# Patient Record
Sex: MALE | Race: WHITE | Employment: OTHER | URBAN - METROPOLITAN AREA
[De-identification: names, ages, dates, MRNs, and addresses within clinical notes are randomized per-mention and may not be internally consistent; named-entity substitution may affect disease eponyms.]

---

## 2017-03-25 ENCOUNTER — APPOINTMENT (OUTPATIENT)
Dept: CT IMAGING | Age: 67
DRG: 066 | End: 2017-03-25
Attending: EMERGENCY MEDICINE
Payer: MEDICARE

## 2017-03-25 ENCOUNTER — APPOINTMENT (OUTPATIENT)
Dept: GENERAL RADIOLOGY | Age: 67
DRG: 066 | End: 2017-03-25
Attending: EMERGENCY MEDICINE
Payer: MEDICARE

## 2017-03-25 ENCOUNTER — APPOINTMENT (OUTPATIENT)
Dept: MRI IMAGING | Age: 67
DRG: 066 | End: 2017-03-25
Attending: SPECIALIST
Payer: MEDICARE

## 2017-03-25 ENCOUNTER — HOSPITAL ENCOUNTER (INPATIENT)
Age: 67
LOS: 1 days | Discharge: HOME | DRG: 066 | End: 2017-03-27
Attending: EMERGENCY MEDICINE | Admitting: INTERNAL MEDICINE
Payer: MEDICARE

## 2017-03-25 ENCOUNTER — APPOINTMENT (OUTPATIENT)
Dept: MRI IMAGING | Age: 67
DRG: 066 | End: 2017-03-25
Attending: INTERNAL MEDICINE
Payer: MEDICARE

## 2017-03-25 DIAGNOSIS — R29.898 LEFT ARM WEAKNESS: Primary | ICD-10-CM

## 2017-03-25 DIAGNOSIS — I16.1 HYPERTENSIVE EMERGENCY: ICD-10-CM

## 2017-03-25 PROBLEM — R53.1 WEAKNESS: Status: ACTIVE | Noted: 2017-03-25

## 2017-03-25 PROBLEM — E66.9 OBESITY (BMI 30-39.9): Chronic | Status: ACTIVE | Noted: 2017-03-25

## 2017-03-25 PROBLEM — I10 HTN (HYPERTENSION): Chronic | Status: ACTIVE | Noted: 2017-03-25

## 2017-03-25 PROBLEM — I10 HTN (HYPERTENSION): Status: ACTIVE | Noted: 2017-03-25

## 2017-03-25 PROBLEM — E78.00 HIGH CHOLESTEROL: Chronic | Status: ACTIVE | Noted: 2017-03-25

## 2017-03-25 PROBLEM — E78.00 HIGH CHOLESTEROL: Status: ACTIVE | Noted: 2017-03-25

## 2017-03-25 LAB
ALBUMIN SERPL BCP-MCNC: 3.9 G/DL (ref 3.5–5)
ALBUMIN/GLOB SERPL: 0.9 {RATIO} (ref 1.1–2.2)
ALP SERPL-CCNC: 113 U/L (ref 45–117)
ALT SERPL-CCNC: 21 U/L (ref 12–78)
ANION GAP BLD CALC-SCNC: 9 MMOL/L (ref 5–15)
APTT PPP: 32.6 SEC (ref 22.1–32.5)
AST SERPL W P-5'-P-CCNC: 18 U/L (ref 15–37)
ATRIAL RATE: 86 BPM
BASOPHILS # BLD AUTO: 0 K/UL (ref 0–0.1)
BASOPHILS # BLD: 0 % (ref 0–1)
BILIRUB SERPL-MCNC: 0.5 MG/DL (ref 0.2–1)
BUN SERPL-MCNC: 16 MG/DL (ref 6–20)
BUN/CREAT SERPL: 14 (ref 12–20)
CALCIUM SERPL-MCNC: 8.8 MG/DL (ref 8.5–10.1)
CALCULATED P AXIS, ECG09: 49 DEGREES
CALCULATED R AXIS, ECG10: -23 DEGREES
CALCULATED T AXIS, ECG11: 86 DEGREES
CHLORIDE SERPL-SCNC: 102 MMOL/L (ref 97–108)
CK SERPL-CCNC: 78 U/L (ref 39–308)
CO2 SERPL-SCNC: 28 MMOL/L (ref 21–32)
CREAT SERPL-MCNC: 1.17 MG/DL (ref 0.7–1.3)
DIAGNOSIS, 93000: NORMAL
EOSINOPHIL # BLD: 0.1 K/UL (ref 0–0.4)
EOSINOPHIL NFR BLD: 2 % (ref 0–7)
ERYTHROCYTE [DISTWIDTH] IN BLOOD BY AUTOMATED COUNT: 13 % (ref 11.5–14.5)
GLOBULIN SER CALC-MCNC: 4.5 G/DL (ref 2–4)
GLUCOSE BLD STRIP.AUTO-MCNC: 113 MG/DL (ref 65–100)
GLUCOSE SERPL-MCNC: 124 MG/DL (ref 65–100)
HCT VFR BLD AUTO: 51.9 % (ref 36.6–50.3)
HGB BLD-MCNC: 17.3 G/DL (ref 12.1–17)
INR BLD: 1 (ref 0.9–1.2)
LYMPHOCYTES # BLD AUTO: 20 % (ref 12–49)
LYMPHOCYTES # BLD: 1.6 K/UL (ref 0.8–3.5)
MCH RBC QN AUTO: 30.6 PG (ref 26–34)
MCHC RBC AUTO-ENTMCNC: 33.3 G/DL (ref 30–36.5)
MCV RBC AUTO: 91.9 FL (ref 80–99)
MONOCYTES # BLD: 0.6 K/UL (ref 0–1)
MONOCYTES NFR BLD AUTO: 7 % (ref 5–13)
NEUTS SEG # BLD: 5.6 K/UL (ref 1.8–8)
NEUTS SEG NFR BLD AUTO: 71 % (ref 32–75)
P-R INTERVAL, ECG05: 156 MS
PLATELET # BLD AUTO: 241 K/UL (ref 150–400)
POTASSIUM SERPL-SCNC: 4.2 MMOL/L (ref 3.5–5.1)
PROT SERPL-MCNC: 8.4 G/DL (ref 6.4–8.2)
Q-T INTERVAL, ECG07: 402 MS
QRS DURATION, ECG06: 100 MS
QTC CALCULATION (BEZET), ECG08: 481 MS
RBC # BLD AUTO: 5.65 M/UL (ref 4.1–5.7)
SERVICE CMNT-IMP: ABNORMAL
SODIUM SERPL-SCNC: 139 MMOL/L (ref 136–145)
THERAPEUTIC RANGE,PTTT: ABNORMAL SECS (ref 58–77)
TROPONIN I SERPL-MCNC: <0.04 NG/ML
TROPONIN I SERPL-MCNC: <0.04 NG/ML
VENTRICULAR RATE, ECG03: 86 BPM
WBC # BLD AUTO: 7.9 K/UL (ref 4.1–11.1)

## 2017-03-25 PROCEDURE — 70544 MR ANGIOGRAPHY HEAD W/O DYE: CPT

## 2017-03-25 PROCEDURE — 70551 MRI BRAIN STEM W/O DYE: CPT

## 2017-03-25 PROCEDURE — 70450 CT HEAD/BRAIN W/O DYE: CPT

## 2017-03-25 PROCEDURE — 85610 PROTHROMBIN TIME: CPT

## 2017-03-25 PROCEDURE — 93005 ELECTROCARDIOGRAM TRACING: CPT

## 2017-03-25 PROCEDURE — 85730 THROMBOPLASTIN TIME PARTIAL: CPT | Performed by: EMERGENCY MEDICINE

## 2017-03-25 PROCEDURE — 74011250636 HC RX REV CODE- 250/636: Performed by: INTERNAL MEDICINE

## 2017-03-25 PROCEDURE — 99218 HC RM OBSERVATION: CPT

## 2017-03-25 PROCEDURE — 84484 ASSAY OF TROPONIN QUANT: CPT | Performed by: EMERGENCY MEDICINE

## 2017-03-25 PROCEDURE — 94762 N-INVAS EAR/PLS OXIMTRY CONT: CPT

## 2017-03-25 PROCEDURE — 71010 XR CHEST PORT: CPT

## 2017-03-25 PROCEDURE — 85025 COMPLETE CBC W/AUTO DIFF WBC: CPT | Performed by: EMERGENCY MEDICINE

## 2017-03-25 PROCEDURE — 74011000250 HC RX REV CODE- 250: Performed by: EMERGENCY MEDICINE

## 2017-03-25 PROCEDURE — 74011250637 HC RX REV CODE- 250/637: Performed by: EMERGENCY MEDICINE

## 2017-03-25 PROCEDURE — 82550 ASSAY OF CK (CPK): CPT | Performed by: INTERNAL MEDICINE

## 2017-03-25 PROCEDURE — 96374 THER/PROPH/DIAG INJ IV PUSH: CPT

## 2017-03-25 PROCEDURE — 82962 GLUCOSE BLOOD TEST: CPT

## 2017-03-25 PROCEDURE — 36415 COLL VENOUS BLD VENIPUNCTURE: CPT | Performed by: INTERNAL MEDICINE

## 2017-03-25 PROCEDURE — 99285 EMERGENCY DEPT VISIT HI MDM: CPT

## 2017-03-25 PROCEDURE — 80053 COMPREHEN METABOLIC PANEL: CPT | Performed by: EMERGENCY MEDICINE

## 2017-03-25 RX ORDER — LORATADINE 10 MG/1
10 TABLET ORAL DAILY
COMMUNITY
End: 2017-04-10

## 2017-03-25 RX ORDER — SODIUM CHLORIDE 0.9 % (FLUSH) 0.9 %
5-10 SYRINGE (ML) INJECTION AS NEEDED
Status: DISCONTINUED | OUTPATIENT
Start: 2017-03-25 | End: 2017-03-27 | Stop reason: HOSPADM

## 2017-03-25 RX ORDER — LORATADINE 10 MG/1
10 TABLET ORAL DAILY
Status: DISCONTINUED | OUTPATIENT
Start: 2017-03-26 | End: 2017-03-27 | Stop reason: HOSPADM

## 2017-03-25 RX ORDER — GUAIFENESIN 100 MG/5ML
162 LIQUID (ML) ORAL
Status: COMPLETED | OUTPATIENT
Start: 2017-03-25 | End: 2017-03-25

## 2017-03-25 RX ORDER — ACETAMINOPHEN 650 MG/1
650 SUPPOSITORY RECTAL
Status: DISCONTINUED | OUTPATIENT
Start: 2017-03-25 | End: 2017-03-27 | Stop reason: HOSPADM

## 2017-03-25 RX ORDER — ATORVASTATIN CALCIUM 10 MG/1
10 TABLET, FILM COATED ORAL DAILY
COMMUNITY
End: 2017-03-27

## 2017-03-25 RX ORDER — ONDANSETRON 2 MG/ML
4 INJECTION INTRAMUSCULAR; INTRAVENOUS
Status: DISCONTINUED | OUTPATIENT
Start: 2017-03-25 | End: 2017-03-27 | Stop reason: HOSPADM

## 2017-03-25 RX ORDER — ATORVASTATIN CALCIUM 10 MG/1
10 TABLET, FILM COATED ORAL DAILY
Status: DISCONTINUED | OUTPATIENT
Start: 2017-03-26 | End: 2017-03-26

## 2017-03-25 RX ORDER — SODIUM CHLORIDE 0.9 % (FLUSH) 0.9 %
5-10 SYRINGE (ML) INJECTION EVERY 8 HOURS
Status: DISCONTINUED | OUTPATIENT
Start: 2017-03-25 | End: 2017-03-27 | Stop reason: HOSPADM

## 2017-03-25 RX ORDER — ENOXAPARIN SODIUM 100 MG/ML
40 INJECTION SUBCUTANEOUS EVERY 24 HOURS
Status: DISCONTINUED | OUTPATIENT
Start: 2017-03-25 | End: 2017-03-27 | Stop reason: HOSPADM

## 2017-03-25 RX ORDER — LABETALOL HYDROCHLORIDE 5 MG/ML
10 INJECTION, SOLUTION INTRAVENOUS
Status: COMPLETED | OUTPATIENT
Start: 2017-03-25 | End: 2017-03-25

## 2017-03-25 RX ORDER — ASPIRIN 325 MG
325 TABLET ORAL DAILY
Status: CANCELLED | OUTPATIENT
Start: 2017-03-26

## 2017-03-25 RX ORDER — NAPROXEN SODIUM 220 MG
440 TABLET ORAL 2 TIMES DAILY WITH MEALS
Status: ON HOLD | COMMUNITY
End: 2017-03-25

## 2017-03-25 RX ORDER — GUAIFENESIN 100 MG/5ML
81 LIQUID (ML) ORAL DAILY
Status: DISCONTINUED | OUTPATIENT
Start: 2017-03-26 | End: 2017-03-27 | Stop reason: HOSPADM

## 2017-03-25 RX ORDER — ACETAMINOPHEN 325 MG/1
650 TABLET ORAL
Status: DISCONTINUED | OUTPATIENT
Start: 2017-03-25 | End: 2017-03-27 | Stop reason: HOSPADM

## 2017-03-25 RX ORDER — LABETALOL HYDROCHLORIDE 5 MG/ML
5 INJECTION, SOLUTION INTRAVENOUS
Status: DISCONTINUED | OUTPATIENT
Start: 2017-03-25 | End: 2017-03-27 | Stop reason: HOSPADM

## 2017-03-25 RX ADMIN — Medication 10 ML: at 22:00

## 2017-03-25 RX ADMIN — Medication 10 ML: at 13:21

## 2017-03-25 RX ADMIN — ASPIRIN 81 MG CHEWABLE TABLET 162 MG: 81 TABLET CHEWABLE at 11:47

## 2017-03-25 RX ADMIN — LABETALOL HYDROCHLORIDE 10 MG: 5 INJECTION, SOLUTION INTRAVENOUS at 11:07

## 2017-03-25 RX ADMIN — ENOXAPARIN SODIUM 40 MG: 40 INJECTION SUBCUTANEOUS at 13:20

## 2017-03-25 NOTE — IP AVS SNAPSHOT
[address]  [address] [phone] Patient: Denia Orlando MRN: [id number] BLM:[id number] You are allergic to the following Allergen Reactions Macrolide Antibiotics Hives Other Medication Hives All mycin medication groups/family Recent Documentation Height Weight BMI Smoking Status 1.854 m 109.6 kg 31.88 kg/m2 Former Smoker Emergency Contacts Name Discharge Info Relation Home Work Mobile Nia Acosta DISCHARGE CAREGIVER [3] Spouse [3] [phone] About your hospitalization You were admitted on:  March 25, 2017 You last received care in the:  [facility] 3 PRO CARE TELE 2 You were discharged on:  March 27, 2017 Unit phone number:  [phone] Why you were hospitalized Your primary diagnosis was:  Acute Cva (Cerebrovascular Accident) (Hcc) Your diagnoses also included:  High Cholesterol, Htn (Hypertension), Obesity (Bmi 30-39. 9) Providers Seen During Your Hospitalizations Provider Role Specialty Primary office phone Zoie Garces MD Attending Provider Emergency Medicine [phone] Jamaica Swain MD Attending Provider Internal Medicine [phone] Your Primary Care Physician (PCP) Primary Care Physician Office Phone Office Fax Arielle, [address] [place] [address] [phone] Follow-up Information Follow up With Details Comments Contact Info Navin Herndon MD In 2 weeks  [address] 
[phone] Tyrel Dose, NP Schedule an appointment as soon as possible for a visit Neurology follow-up in clinic, post stroke with nurse practitioner:  Call to speak with Lucinda Smith or [facility] to schedule appt for 2 weeks from discharge  [address] [facility] [address] [phone] Current Discharge Medication List  
  
START taking these medications Dose & Instructions Dispensing Information Comments Morning Noon Evening Bedtime  
 aspirin 81 mg chewable tablet Your last dose was: Your next dose is:    
   
   
 Dose:  81 mg Take 1 Tab by mouth daily. Indications: Acute Coronary Syndrome Refills:  0  
     
   
   
   
  
 atenolol 25 mg tablet Commonly known as:  TENORMIN Your last dose was: Your next dose is:    
   
   
 Dose:  12.5 mg Take 0.5 Tabs by mouth daily. Quantity:  30 Tab Refills:  0 CONTINUE these medications which have CHANGED Dose & Instructions Dispensing Information Comments Morning Noon Evening Bedtime  
 atorvastatin 40 mg tablet Commonly known as:  LIPITOR What changed:   
- medication strength 
- how much to take Your last dose was: Your next dose is:    
   
   
 Dose:  40 mg Take 1 Tab by mouth daily. Quantity:  30 Tab Refills:  0 CONTINUE these medications which have NOT CHANGED Dose & Instructions Dispensing Information Comments Morning Noon Evening Bedtime CLARITIN 10 mg tablet Generic drug:  loratadine Your last dose was: Your next dose is:    
   
   
 Dose:  10 mg Take 10 mg by mouth daily. Refills:  0 STOP taking these medications ALEVE 220 mg tablet Generic drug:  naproxen sodium Where to Get Your Medications Information on where to get these meds will be given to you by the nurse or doctor. ! Ask your nurse or doctor about these medications  
  atenolol 25 mg tablet  
 atorvastatin 40 mg tablet Discharge Instructions HOSPITALIST DISCHARGE INSTRUCTIONS 
NAME: Aletha Berman :  1950 MRN:  [id number] Date/Time:  3/27/2017 8:12 AM 
 
ADMIT DATE: 3/25/2017 DISCHARGE DATE: 3/27/2017 DISCHARGE DIAGNOSIS: 
 Embolic stroke - Multiple acute cortical and subcortical infarcts in the distal right MCA 
distribution of the right mid and posterior frontal lobe MEDICATIONS: 
· It is important that you take the medication exactly as they are prescribed. · Keep your medication in the bottles provided by the pharmacist and keep a list of the medication names, dosages, and times to be taken in your wallet. · Do not take other medications without consulting your doctor. Pain Management: per above medications What to do at [facility] Recommended diet:  Cardiac Diet Recommended activity: Activity as tolerated Neurology said that a cardiologist will contact you and make arrangements for a cardiac event monitor to be sent to you. Further instructions will be given at that time. If you experience any of the following symptoms then please call your primary care physician or return to the emergency room if you cannot get hold of your doctor: 
Fever, chills, nausea, vomiting, diarrhea, change in mentation, falling, bleeding, shortness of breath Follow Up: Follow-up Information Follow up With Details Comments Contact Info Suhail Jeter MD In 2 weeks  [address] [facility] 
[phone] Tere Edward MD  in 4-6 weeks for stroke follow up [address] [facility] [address] [facility] [address] 
[phone] Information obtained by : 
I understand that if any problems occur once I am at home I am to contact my physician. I understand and acknowledge receipt of the instructions indicated above. Physician's or R.N.'s Signature                                                                  Date/Time Patient or Representative Signature                                                          Date/Time Discharge Orders None [employer] Announcement We are excited to announce that we are making your provider's discharge notes available to you in [employer]. You will see these notes when they are completed and signed by the physician that discharged you from your recent hospital stay. If you have any questions or concerns about any information you see in [employer], please call the Health Information Department where you were seen or reach out to your Primary Care Provider for more information about your plan of care. Introducing [facility]! Adela Obrien introduces [employer] patient portal. Now you can access parts of your medical record, email your doctor's office, and request medication refills online. 1. In your internet browser, go to [email or web address] 2. Click on the First Time User? Click Here link in the Sign In box. You will see the New Member Sign Up page. 3. Enter your [employer] Access Code exactly as it appears below. You will not need to use this code after youve completed the sign-up process. If you do not sign up before the expiration date, you must request a new code. · [employer] Access Code: [email or web address] Expires: 6/23/2017 10:03 AM 
 
4. Enter the last four digits of your Social Security Number (xxxx) and Date of Birth (mm/dd/yyyy) as indicated and click Submit. You will be taken to the next sign-up page. 5. Create a [employer] ID. This will be your [employer] login ID and cannot be changed, so think of one that is secure and easy to remember. 6. Create a [employer] password. You can change your password at any time. 7. Enter your Password Reset Question and Answer. This can be used at a later time if you forget your password. 8. Enter your e-mail address. You will receive e-mail notification when new information is available in [address] 9. Click Sign Up. You can now view and download portions of your medical record. 10. Click the Download Summary menu link to download a portable copy of your medical information. If you have questions, please visit the Frequently Asked Questions section of the [employer] website. Remember, [employer] is NOT to be used for urgent needs. For medical emergencies, dial 911. Now available from your iPhone and Android! General Information Please provide this summary of care documentation to your next provider. Patient Signature:  ____________________________________________________________ Date:  ____________________________________________________________  
  
Ritika Hero Provider Signature:  ____________________________________________________________ Date:  ____________________________________________________________

## 2017-03-25 NOTE — PROGRESS NOTES
1210. Shayy Morrow TRANSFER - IN REPORT:    Verbal report received from Javi Pacheco Rn(name) on Satish Prieto  being received from ED(unit) for routine progression of care      Report consisted of patients Situation, Background, Assessment and   Recommendations(SBAR). Information from the following report(s) SBAR, Kardex and Recent Results was reviewed with the receiving nurse. Opportunity for questions and clarification was provided. Assessment completed upon patients arrival to unit and care assumed. Primary Nurse Liliana Busch, CHEMA and BAYRON RN, RN performed a dual skin assessment on this patient No impairment noted  Reyes score is 23    Admission assessment done. .Vss. Yuri any numbness or weakness. Speech ok. . A&A. No deficit noted. Shayy Morrow Stroke Education provided to patient and the following topics were discussed    1. Patients personal risk factors for stroke are hypertension and smoking    2. Warning signs of Stroke:        * Sudden numbness or weakness of the face, arm or leg, especially on one side of          The body            * Sudden confusion, trouble speaking or understanding        * Sudden trouble seeing in one or both eyes        * Sudden trouble walking, dizziness, loss of balance or coordination        * Sudden severe headache with no known cause      3. Importance of activation Emergency Medical Services ( 9-1-1 ) immediately if experience any warning signs of stroke. 4. Be sure and schedule a follow-up appointment with your primary care doctor or any specialists as instructed. 5. You must take medicine every day to treat your risk factors for stroke. Be sure to take your medicines exactly as your doctor tells you: no more, no less. Know what your medicines are for , what they do. Anti-thrombotics /anticoagulants can help prevent strokes. You are taking the following medicine(s)  sq lovenex, Labetalol, ASA, Lipitor,     6.   Smoking and second-hand smoke greatly increase your risk of stroke, cardiovascular disease and death. Smoking history never    7. Information provided was about stroke education and management. .    8. Documentation of teaching completed in Patient Education Activity and on Care Plan with teaching response noted? YES!!!!    1830- blood to lab. Bedside and Verbal shift change report given to Nicole (oncoming nurse) by Chase Keith (offgoing nurse). Report included the following information SBAR, Kardex, MAR and Recent Results.

## 2017-03-25 NOTE — ED PROVIDER NOTES
HPI Comments: 74 y.o. male with past medical history significant for hypercholesteremia who presents from home with chief complaint of weakness. Pt reports he was at a baseball game in [place], [place] ~45 hours ago when he experienced sudden onset of numbness in his L shoulder extending down his L arm accompanied by L arm weakness and 1 minute of slurred speech. He expresses concern that his L arm weakness and numbness have persisted since that time. Pt notes he has hx of pinched cervical nerves which caused similar symptoms including weakness and numbness, but his current weakness is significantly more severe. He also notes he previously had an MRI which showed an \"1/8 inch separation around (his) shoulder\". Per Pt's wife, Pt takes \"a statin\" regularly and took 162 mg ASA PTA. Pt denies previous hx of stroke, MI, DM, and other cardiac problems. Pt denies headache, chest pain, SOB, and other pain. There are no other acute medical concerns at this time. Social hx: former smoker, smoked for 35 years    PCP: Armando Meek MD, sees regularly \"every 6 months\"    Note written by Shirlene Arias, as dictated by Johan Allred MD 10:12 AM    The history is provided by the patient and the spouse. Past Medical History:   Diagnosis Date    Hypercholesteremia        No past surgical history on file. No family history on file. Social History     Social History    Marital status: N/A     Spouse name: N/A    Number of children: N/A    Years of education: N/A     Occupational History    Not on file. Social History Main Topics    Smoking status: Former Smoker    Smokeless tobacco: Not on file    Alcohol use Yes      Comment: social     Drug use: No    Sexual activity: Yes     Other Topics Concern    Not on file     Social History Narrative    No narrative on file         ALLERGIES: Review of patient's allergies indicates not on file.     Review of Systems   Respiratory: Negative for shortness of breath. Cardiovascular: Negative for chest pain. Neurological: Positive for speech difficulty, weakness and numbness. Negative for headaches. All other systems reviewed and are negative. Vitals:    03/25/17 1008   BP: (!) 235/130   Pulse: 92   Resp: 18   Temp: 97.4 °F (36.3 °C)   SpO2: 96%   Weight: 108.9 kg (240 lb)   Height: 6' 1\" (1.854 m)            Physical Exam   Constitutional: He is oriented to person, place, and time. He appears well-developed and well-nourished. No distress. HENT:   Head: Normocephalic and atraumatic. Eyes: Conjunctivae and EOM are normal. Pupils are equal, round, and reactive to light. Neck: Normal range of motion. Neck supple. Cardiovascular: Normal rate, regular rhythm, normal heart sounds and intact distal pulses. Exam reveals no friction rub. No murmur heard. Pulmonary/Chest: Effort normal and breath sounds normal. No respiratory distress. He has no wheezes. He has no rales. He exhibits no tenderness. Abdominal: Soft. Bowel sounds are normal. He exhibits no distension. There is no tenderness. There is no rebound and no guarding. Musculoskeletal: Normal range of motion. He exhibits no edema or tenderness. Neurological: He is alert and oriented to person, place, and time. No cranial nerve deficit. He exhibits normal muscle tone. Coordination abnormal.   4/5 strength left bicep, tricep, deltoid,  strength; palpable radial pulse; 5/5 strength b/l LE and RUE   Skin: Skin is warm and dry. He is not diaphoretic. No pallor. Psychiatric: He has a normal mood and affect. His behavior is normal.   Nursing note and vitals reviewed. MDM  Number of Diagnoses or Management Options  Hypertensive emergency:   Left arm weakness:   Diagnosis management comments: ? Ischemic vs hemorrhagic cva though no headache but elevated bp vs cervical radiculopathy.  Doubt cardiac as no cp but will check       Amount and/or Complexity of Data Reviewed  Clinical lab tests: ordered and reviewed  Tests in the radiology section of CPT®: ordered and reviewed  Discuss the patient with other providers: yes (hospitalist)  Independent visualization of images, tracings, or specimens: yes (ekg)    Critical Care  Total time providing critical care: 30-74 minutes    Patient Progress  Patient progress: improved    ED Course       Procedures  EKG interpretation: (Preliminary)  Rhythm: normal sinus rhythm; and regular . Rate (approx.): 85; Axis: normal; P wave: normal; QRS interval: normal ; ST/T wave: non-specific changes;       PROGRESS NOTE:  10:51 AM  The patients blood pressure is still in the 116'Q with a diastolic pressure of 799. Will administer Labetalol    11:25 AM  Johan Allred MD have spent 30 minutes of critical care time involved in lab review, consultations with specialist, family decision-making, and documentation. During this entire length of time I was immediately available to the patient. PROGRESS NOTE:  11:27 AM  Pt's blood pressure has dropped to 160/80, wife notes that pt has elevated bp. Pt notes his L arm weakness felt better when his BP reduced. Discussed case with patient and wife, patient is in agreement to admission. CONSULT NOTE:  11:26 AM Johan Allred MD spoke with Dr. Candido Benson, Consult for Hospitalist.  Discussed available diagnostic tests and clinical findings. He is in agreement with care plans as outlined. Dr. Candido Benson will admit Pt.  Suspect sx either due to htn or possible cva

## 2017-03-25 NOTE — ROUTINE PROCESS
TRANSFER - OUT REPORT:    Verbal report given to Macrina RN(name) on Evan Hong  being transferred to [facility] telemetry(unit) for routine progression of care       Report consisted of patients Situation, Background, Assessment and   Recommendations(SBAR). Information from the following report(s) SBAR, ED Summary, MAR, Recent Results and Cardiac Rhythm sinus was reviewed with the receiving nurse. Lines:   Peripheral IV 03/25/17 Left Antecubital (Active)   Site Assessment Clean, dry, & intact 3/25/2017 10:12 AM   Phlebitis Assessment 0 3/25/2017 10:12 AM   Infiltration Assessment 0 3/25/2017 10:12 AM   Dressing Status Clean, dry, & intact 3/25/2017 10:12 AM   Dressing Type 4 X 4 3/25/2017 10:12 AM   Hub Color/Line Status Pink 3/25/2017 10:12 AM        Opportunity for questions and clarification was provided.       Patient transported with:   Tech/Paramedic

## 2017-03-25 NOTE — CONSULTS
Pranav Velazco [facility]   [address] [facility], [address]   [address] [facility]       Name:  Max Cavanaugh   MR#:  [id number]   :  1950   Account #:  [de-identified]    Date of Consultation:  2017   Date of Adm:  2017       Neurologic consultation at the request of hospitalist for possible stroke   expression. 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 He says that 2 days ago, he noticed the sudden onset of left   upper extremity weakness apart from any numbness or pain quality. He may have noticed an ever so quickly brief thickening of speech at   the same time, but that is currently gone. The patient thinks there has   been some definite improvement in his left upper extremity   performance and no new features added. He does have background   hypercholesterolemia, hypertension, and obesity. He does not take   aspirin at home on a regular basis. History of knee issues from an   orthopedic standpoint. SOCIAL HISTORY: Former smoker. Alcohol use: Occasional, not an   issue otherwise. FAMILY HISTORY: Positive for hypertension and heart disease. ALLERGIES: HE HAS ALLERGIES TO ALL THE MYCIN   MEDICATION GROUP WITH HIVES. REVIEW OF SYSTEMS: Negative or per history of present illness. LABORATORY DATA: Testing since here includes a normal CBC with   differential except for slightly elevated hemoglobin and hematocrit as   noted. His INR is normal, APTT slightly increased. Chemistries show   random glucose of 124, total protein slightly increased. His head CT scan preliminary reading, no acute pathology spotted. Portable chest from earlier today, no acute CP process. Brain MRI   shows multiple acute cortical, subcortical infarcts in the distal right   MCA distribution of the right mid and posterior frontal lobe. EKG   normal sinus rhythm.  Nonspecific ST and T-wave changes. Prolonged   QT. Echo is currently pending. PHYSICAL EXAMINATION   GENERAL: Pleasant-appearing, middle-aged white male. He is alert. He is cooperative. He is fluent, articulate. Behavior appropriate,   following 3-step commands, oriented x4. VITAL SIGNS: Temperature 98.3, pulse 74, blood pressure 147/99,   respirations 12, pulse oximetry 98% on room air. HEENT: Normocephalic, atraumatic, without bruits. Ears, nose and   throat were clear. NECK: Reasonably supple, no lymphadenopathy, carotid upstroke,   nontender, no bruits. Range of motion complete. CHEST: Clear. No rales or wheezes. CARDIOVASCULAR: Currently regular rate and rhythm without   gallops, murmurs, or rubs. Pulses +2. ABDOMEN: Rounded, nontender. EXTREMITIES: Full range of motion without cyanosis, clubbing,   edema, rash, or birthmarks. No involuntary movements seen. NEUROLOGIC EXAMINATION   CRANIAL NERVES: 2-12 with funduscopic normal under nondilated   conditions. Pupils equal, round and react to light. Afferent pupillary   defect negative. Lid fissures symmetric. External appearance normal.   Facial animation currently symmetric. Face sensibility intact. Oral exam   normal. Normal motor mechanics of tongue and palate. Speech is   fluent and articulate. CEREBELLAR TESTING: Finger-nose-finger, toe-to-finger diminished   in the left arm compared due to weakness. See motor exam.   MOTOR EXAM: 5/5 on the right. Left upper extremity would be   assessed at 4+/5 with maximum and sustained effort on the patient's   part. Did not make any distinction for left lower extremity weakness at   this time. SENSORY: Sensibility below the chin intact to touch, temperature, and   vibratory according to the patient. REFLEXES: Are approaching +2 above the waist. Knee jerks were no   worse than +1. Ankle jerks were trace to +1. Toes downgoing   bilaterally. No clonus, no Michelle's.    GAIT: I saw him walk literally coming back from MRI from the   wheelchair to his bed and he accomplished that without any difficulties. IMPRESSION: Right hemispheric strokes as defined, right distal   middle cerebral artery distribution. Would question whether he has a true embolic source such as   ipsilateral carotid or major intravascular disease source versus cardiogenic   type of embolic stroke potential (?). Agree with current orders that   include the following that have been put into play. He is on a baby aspirin. He is on 10 mg strength Lipitor. He is on   Lovenox to prevent DVT and PE. He has a lipid panel on order. As   stated, his echo is pending. I will order a carotid Doppler and an MRA   of the brain if this has not already been put into play. He has orders to   PT and OT, and speech therapy, neuro checks and will see where the   workup goes at this point. He has managed to improve his situation in   the left upper extremity since admission and that is good, and we will   see what additional progress he makes. Risk factors as outlined. Thank you for the chance to see this nice gentleman.         Caity Leon MD      [facility] / [place]   D:  03/25/2017   14:39   T:  03/25/2017   15:13   Job #:  [id number]

## 2017-03-25 NOTE — IP AVS SNAPSHOT
Current Discharge Medication List  
  
START taking these medications Dose & Instructions Dispensing Information Comments Morning Noon Evening Bedtime  
 aspirin 81 mg chewable tablet Your last dose was: Your next dose is:    
   
   
 Dose:  81 mg Take 1 Tab by mouth daily. Indications: Acute Coronary Syndrome Refills:  0  
     
   
   
   
  
 atenolol 25 mg tablet Commonly known as:  TENORMIN Your last dose was: Your next dose is:    
   
   
 Dose:  12.5 mg Take 0.5 Tabs by mouth daily. Quantity:  30 Tab Refills:  0 CONTINUE these medications which have CHANGED Dose & Instructions Dispensing Information Comments Morning Noon Evening Bedtime  
 atorvastatin 40 mg tablet Commonly known as:  LIPITOR What changed:   
- medication strength 
- how much to take Your last dose was: Your next dose is:    
   
   
 Dose:  40 mg Take 1 Tab by mouth daily. Quantity:  30 Tab Refills:  0 CONTINUE these medications which have NOT CHANGED Dose & Instructions Dispensing Information Comments Morning Noon Evening Bedtime CLARITIN 10 mg tablet Generic drug:  loratadine Your last dose was: Your next dose is:    
   
   
 Dose:  10 mg Take 10 mg by mouth daily. Refills:  0 STOP taking these medications ALEVE 220 mg tablet Generic drug:  naproxen sodium Where to Get Your Medications Information on where to get these meds will be given to you by the nurse or doctor. ! Ask your nurse or doctor about these medications  
  atenolol 25 mg tablet  
 atorvastatin 40 mg tablet

## 2017-03-25 NOTE — ED TRIAGE NOTES
Pt states left arm numbness and weakness with slurred speech on Thursday states increase of weakness of left side slurred speech has subsided

## 2017-03-25 NOTE — PROGRESS NOTES
[facility]: MED RECONCILIATION    Comments/Recommendations: - Verified allergies as documented  - Patient reports that he does not take any other OTC or prescription medications other than those documented. - He states that takes claritin daily as advised by a doctor for when he was getting hives. He has seen significant improvement since starting to take it. - As noted in the EMR, he was on a BP medication a while ago, but he cannot recall the name. Medications added:     · Claritin  · Atorvastatin  · Aleve    Medications removed:    · None    Medications adjusted:    · None    Information obtained from: Patient    Allergies: Other medication      Prior to Admission Medications:   Prior to Admission Medications   Prescriptions Last Dose Informant Patient Reported? Taking?   atorvastatin (LIPITOR) 10 mg tablet 3/24/2017 at AM Self Yes Yes   Sig: Take 10 mg by mouth daily. loratadine (CLARITIN) 10 mg tablet 3/24/2017 at AM Self Yes Yes   Sig: Take 10 mg by mouth daily. naproxen sodium (ALEVE) 220 mg tablet 3/25/2017 at AM Self Yes Yes   Sig: Take 440 mg by mouth two (2) times daily (with meals).       Facility-Administered Medications: None       Thank you,  Jcarlos Barry, PharmD     Contact: [phone]

## 2017-03-25 NOTE — ED NOTES
After blood pressure medication given and pt's pressure decreased, pt states the numbness in his left arm seemed to resolve. Pt also states 3-4 years ago, he was placed on blood pressure medication by his PCP but was taken off of it because his pressure was \"to low\". Pt states his pressure was elevated when he saw his PCP rosario 6 months ago but was not placed on any meds at that time. Pt aware of this conversation and improvement of symptoms.

## 2017-03-26 PROBLEM — I63.9 ACUTE CVA (CEREBROVASCULAR ACCIDENT) (HCC): Status: ACTIVE | Noted: 2017-03-26

## 2017-03-26 LAB
ANION GAP BLD CALC-SCNC: 9 MMOL/L (ref 5–15)
BUN SERPL-MCNC: 20 MG/DL (ref 6–20)
BUN/CREAT SERPL: 21 (ref 12–20)
CALCIUM SERPL-MCNC: 8.6 MG/DL (ref 8.5–10.1)
CHLORIDE SERPL-SCNC: 104 MMOL/L (ref 97–108)
CHOLEST SERPL-MCNC: 187 MG/DL
CK MB CFR SERPL CALC: 2.7 % (ref 0–2.5)
CK MB SERPL-MCNC: 2 NG/ML (ref 5–25)
CK SERPL-CCNC: 73 U/L (ref 39–308)
CO2 SERPL-SCNC: 28 MMOL/L (ref 21–32)
CREAT SERPL-MCNC: 0.97 MG/DL (ref 0.7–1.3)
ERYTHROCYTE [DISTWIDTH] IN BLOOD BY AUTOMATED COUNT: 13 % (ref 11.5–14.5)
EST. AVERAGE GLUCOSE BLD GHB EST-MCNC: 140 MG/DL
GLUCOSE SERPL-MCNC: 111 MG/DL (ref 65–100)
HBA1C MFR BLD: 6.5 % (ref 4.2–6.3)
HCT VFR BLD AUTO: 48 % (ref 36.6–50.3)
HDLC SERPL-MCNC: 45 MG/DL
HDLC SERPL: 4.2 {RATIO} (ref 0–5)
HGB BLD-MCNC: 16.6 G/DL (ref 12.1–17)
LDLC SERPL CALC-MCNC: 113.6 MG/DL (ref 0–100)
LIPID PROFILE,FLP: ABNORMAL
MAGNESIUM SERPL-MCNC: 2.3 MG/DL (ref 1.6–2.4)
MCH RBC QN AUTO: 31.1 PG (ref 26–34)
MCHC RBC AUTO-ENTMCNC: 34.6 G/DL (ref 30–36.5)
MCV RBC AUTO: 90.1 FL (ref 80–99)
PHOSPHATE SERPL-MCNC: 4.2 MG/DL (ref 2.6–4.7)
PLATELET # BLD AUTO: 227 K/UL (ref 150–400)
POTASSIUM SERPL-SCNC: 4.1 MMOL/L (ref 3.5–5.1)
RBC # BLD AUTO: 5.33 M/UL (ref 4.1–5.7)
SODIUM SERPL-SCNC: 141 MMOL/L (ref 136–145)
TRIGL SERPL-MCNC: 142 MG/DL (ref ?–150)
TROPONIN I SERPL-MCNC: <0.04 NG/ML
VLDLC SERPL CALC-MCNC: 28.4 MG/DL
WBC # BLD AUTO: 6.5 K/UL (ref 4.1–11.1)

## 2017-03-26 PROCEDURE — 99218 HC RM OBSERVATION: CPT

## 2017-03-26 PROCEDURE — 83735 ASSAY OF MAGNESIUM: CPT | Performed by: INTERNAL MEDICINE

## 2017-03-26 PROCEDURE — 97165 OT EVAL LOW COMPLEX 30 MIN: CPT | Performed by: OCCUPATIONAL THERAPIST

## 2017-03-26 PROCEDURE — 74011250636 HC RX REV CODE- 250/636: Performed by: INTERNAL MEDICINE

## 2017-03-26 PROCEDURE — 85027 COMPLETE CBC AUTOMATED: CPT | Performed by: INTERNAL MEDICINE

## 2017-03-26 PROCEDURE — 84100 ASSAY OF PHOSPHORUS: CPT | Performed by: INTERNAL MEDICINE

## 2017-03-26 PROCEDURE — 65660000000 HC RM CCU STEPDOWN

## 2017-03-26 PROCEDURE — 84484 ASSAY OF TROPONIN QUANT: CPT | Performed by: INTERNAL MEDICINE

## 2017-03-26 PROCEDURE — 80061 LIPID PANEL: CPT | Performed by: INTERNAL MEDICINE

## 2017-03-26 PROCEDURE — 83036 HEMOGLOBIN GLYCOSYLATED A1C: CPT | Performed by: INTERNAL MEDICINE

## 2017-03-26 PROCEDURE — 82550 ASSAY OF CK (CPK): CPT | Performed by: INTERNAL MEDICINE

## 2017-03-26 PROCEDURE — 36415 COLL VENOUS BLD VENIPUNCTURE: CPT | Performed by: INTERNAL MEDICINE

## 2017-03-26 PROCEDURE — 80048 BASIC METABOLIC PNL TOTAL CA: CPT | Performed by: INTERNAL MEDICINE

## 2017-03-26 PROCEDURE — 97161 PT EVAL LOW COMPLEX 20 MIN: CPT

## 2017-03-26 PROCEDURE — 97116 GAIT TRAINING THERAPY: CPT

## 2017-03-26 PROCEDURE — 93306 TTE W/DOPPLER COMPLETE: CPT

## 2017-03-26 PROCEDURE — 74011250637 HC RX REV CODE- 250/637: Performed by: INTERNAL MEDICINE

## 2017-03-26 PROCEDURE — 93880 EXTRACRANIAL BILAT STUDY: CPT

## 2017-03-26 PROCEDURE — 97112 NEUROMUSCULAR REEDUCATION: CPT | Performed by: OCCUPATIONAL THERAPIST

## 2017-03-26 RX ORDER — ATORVASTATIN CALCIUM 40 MG/1
40 TABLET, FILM COATED ORAL DAILY
Qty: 30 TAB | Refills: 0 | Status: SHIPPED | OUTPATIENT
Start: 2017-03-27 | End: 2017-04-10

## 2017-03-26 RX ORDER — ATENOLOL 25 MG/1
12.5 TABLET ORAL DAILY
Qty: 30 TAB | Refills: 0 | Status: SHIPPED | OUTPATIENT
Start: 2017-03-26 | End: 2017-06-14 | Stop reason: SINTOL

## 2017-03-26 RX ORDER — ATORVASTATIN CALCIUM 20 MG/1
40 TABLET, FILM COATED ORAL DAILY
Status: DISCONTINUED | OUTPATIENT
Start: 2017-03-27 | End: 2017-03-27

## 2017-03-26 RX ORDER — GUAIFENESIN 100 MG/5ML
81 LIQUID (ML) ORAL DAILY
Status: SHIPPED | COMMUNITY
Start: 2017-03-26

## 2017-03-26 RX ORDER — ATENOLOL 25 MG/1
12.5 TABLET ORAL DAILY
Status: DISCONTINUED | OUTPATIENT
Start: 2017-03-26 | End: 2017-03-27 | Stop reason: HOSPADM

## 2017-03-26 RX ADMIN — ATORVASTATIN CALCIUM 10 MG: 10 TABLET, FILM COATED ORAL at 09:00

## 2017-03-26 RX ADMIN — Medication 10 ML: at 08:00

## 2017-03-26 RX ADMIN — ATENOLOL 12.5 MG: 25 TABLET ORAL at 12:28

## 2017-03-26 RX ADMIN — ASPIRIN 81 MG CHEWABLE TABLET 81 MG: 81 TABLET CHEWABLE at 09:00

## 2017-03-26 RX ADMIN — ENOXAPARIN SODIUM 40 MG: 40 INJECTION SUBCUTANEOUS at 12:28

## 2017-03-26 RX ADMIN — LORATADINE 10 MG: 10 TABLET ORAL at 09:00

## 2017-03-26 RX ADMIN — Medication 10 ML: at 13:29

## 2017-03-26 RX ADMIN — ATORVASTATIN CALCIUM 30 MG: 20 TABLET, FILM COATED ORAL at 11:00

## 2017-03-26 NOTE — PROGRESS NOTES
Bedside and Verbal shift change report given to Carlos Nelson RN (oncoming nurse) by Luis Fernando Camara (offgoing nurse). Report included the following information SBAR, Kardex and Recent Results. Bedside and Verbal shift change report given to [address]ole (oncoming nurse) by Carlos Nelson RN (offgoing nurse). Report included the following information SBAR, Kardex and Recent Results.

## 2017-03-26 NOTE — PROGRESS NOTES
Pranav Velazco [facility] [address]  [address], [place], [address] [facility]  [phone]      Medical Progress Note      NAME: Satish Prieto   :  1950  MRM:  [id number]    Date/Time: 3/26/2017  7:55 AM         Subjective:     Chief Complaint:  Weakness: left arm, very mild, constant, better than yesterday, no longer associated with numbness    ROS:  (bold if positive, if negative)                        Tolerating Diet          Objective:       Vitals:          Last 24hrs VS reviewed since prior progress note.  Most recent are:    Visit Vitals    BP (!) 175/91 (BP 1 Location: Right arm, BP Patient Position: At rest)    Pulse 69    Temp 98.3 °F (36.8 °C)    Resp 16    Ht 6' 1\" (1.854 m)    Wt 108.9 kg (240 lb)    SpO2 93%    BMI 31.66 kg/m2     SpO2 Readings from Last 6 Encounters:   17 93%            Intake/Output Summary (Last 24 hours) at 17 0755  Last data filed at 17 1741   Gross per 24 hour   Intake              950 ml   Output                0 ml   Net              950 ml          Exam:     Physical Exam:    Gen:  Well-developed, well-nourished, in no acute distress  HEENT:  Pink conjunctivae, PERRL, hearing intact to voice, moist mucous membranes  Neck:  Supple, without masses, thyroid non-tender  Resp:  No accessory muscle use, clear breath sounds without wheezes rales or rhonchi  Card:  No murmurs, normal S1, S2 without thrills, bruits or peripheral edema  Abd:  Soft, non-tender, non-distended, normoactive bowel sounds are present, no palpable organomegaly and no detectable hernias  Lymph:  No cervical or inguinal adenopathy  Musc:  No cyanosis or clubbing  Skin:  No rashes or ulcers, skin turgor is good  Neuro:  Cranial nerves are grossly intact, no focal motor weakness (I can't detect any weakness on exam), follows commands appropriately  Psych:  Good insight, oriented to person, place and time, alert       Telemetry reviewed:   normal sinus rhythm    Medications Reviewed: (see below)    Lab Data Reviewed: (see below)    ______________________________________________________________________    Medications:     Current Facility-Administered Medications   Medication Dose Route Frequency    sodium chloride (NS) flush 5-10 mL  5-10 mL IntraVENous Q8H    sodium chloride (NS) flush 5-10 mL  5-10 mL IntraVENous PRN    ondansetron (ZOFRAN) injection 4 mg  4 mg IntraVENous Q6H PRN    labetalol (NORMODYNE;TRANDATE) injection 5 mg  5 mg IntraVENous Q10MIN PRN    acetaminophen (TYLENOL) tablet 650 mg  650 mg Oral Q4H PRN    Or    acetaminophen (TYLENOL) solution 650 mg  650 mg Per NG tube Q4H PRN    Or    acetaminophen (TYLENOL) suppository 650 mg  650 mg Rectal Q4H PRN    enoxaparin (LOVENOX) injection 40 mg  40 mg SubCUTAneous Q24H    aspirin chewable tablet 81 mg  81 mg Oral DAILY    atorvastatin (LIPITOR) tablet 10 mg  10 mg Oral DAILY    loratadine (CLARITIN) tablet 10 mg  10 mg Oral DAILY            Lab Review:     Recent Labs      03/26/17   0530  03/25/17   1013   WBC  6.5  7.9   HGB  16.6  17.3*   HCT  48.0  51.9*   PLT  227  241     Recent Labs      03/26/17   0530  03/25/17   1023  03/25/17   1013   NA  141   --   139   K  4.1   --   4.2   CL  104   --   102   CO2  28   --   28   GLU  111*   --   124*   BUN  20   --   16   CREA  0.97   --   1.17   CA  8.6   --   8.8   MG  2.3   --    --    PHOS  4.2   --    --    ALB   --    --   3.9   TBILI   --    --   0.5   SGOT   --    --   18   ALT   --    --   21   INR   --   1.0   --      Lab Results   Component Value Date/Time    Glucose (POC) 113 03/25/2017 10:21 AM     No results for input(s): PH, PCO2, PO2, HCO3, FIO2 in the last 72 hours.   Recent Labs      03/25/17   1023   INR  1.0     No results found for: SDES  No results found for: CULT         Assessment:     Principal Problem:    Acute CVA (cerebrovascular accident) ([facility] [address] (3/26/2017)    Active Problems:    High cholesterol (3/25/2017) HTN (hypertension) (3/25/2017)      Obesity (BMI 30-39.9) (3/25/2017)           Plan:     Principal Problem:    Acute CVA (cerebrovascular accident) ([address] (3/26/2017)   - MRI with acute embolic CVA    - PT/OT/Speech to see   - echo and cholesterol pending   - continue ASA    Active Problems:    High cholesterol (3/25/2017)   - suspect will need to increase Lipitor when cholesterol panel comes back      HTN (hypertension) (3/25/2017)   - BP remains elevated   - will start beta blocker when okay with Neurology      Obesity (BMI 30-39.9) (3/25/2017)   - counseled on weight loss      Total time spent with patient: 35 minutes                  Care Plan discussed with: Patient and Nursing Staff    Discussed:  Code Status, Care Plan and D/C Planning    Prophylaxis:  Lovenox and SCD's    Disposition:  Home w/Family           ___________________________________________________    Attending Physician: Jamaica Swain MD

## 2017-03-26 NOTE — PROGRESS NOTES
physical Therapy EVALUATION/DISCHARGE  Patient: Blossom Marcial (80 y.o. male)  Date: 3/26/2017  Primary Diagnosis: weakness  Acute CVA (cerebrovascular accident) [facility])             ASSESSMENT :  Based on the objective data described above, the patient presents with independent with ambulation without assistive device as well as up and down stairs and independent with all functional mobility. Reviewed sign and symptoms of stroke with the patient and verbalized understanding. Reviewed all safety precaution and home exercise program with the patient, verbalized understanding, clear to go home per Physical Therapy perspective. Skilled physical therapy is not indicated at this time. PLAN :  Discharge Recommendations: None  Further Equipment Recommendations for Discharge: none     SUBJECTIVE:   Patient stated I feel much better each day.     OBJECTIVE DATA SUMMARY:   HISTORY:    Past Medical History:   Diagnosis Date    Hypercholesteremia     Hypertension     Obesity (BMI 30-39.9) 3/25/2017     Past Surgical History:   Procedure Laterality Date    HX ORTHOPAEDIC      knees     Prior Level of Function/Home Situation: Independent community ambulator without assistive device. Personal factors and/or comorbidities impacting plan of care:     Home Situation  Home Environment: Private residence  # Steps to Enter: 2  One/Two Story Residence: Two story  # of Interior Steps: 12  Height of Each Step (in): 1 inches  Interior Rails: Both  Lift Chair Available: No  Living Alone: No  Support Systems: Child(dasha), [employer], Family member(s), Friends \ neighbors  Patient Expects to be Discharged to[de-identified] Private residence  Current DME Used/Available at Home: None    EXAMINATION/PRESENTATION/DECISION MAKING:   Critical Behavior:  Neurologic State: Alert  Orientation Level: Oriented X4  Cognition: Follows commands     Hearing:   Auditory  Auditory Impairment: None    Range Of Motion:  AROM: Within functional limits PROM: Within functional limits           Strength:    Strength: Within functional limits (mild weakness on left  4+/5)                    Tone & Sensation:                                  Coordination:  Coordination: Within functional limits  Vision:      Functional Mobility:  Bed Mobility:  Rolling: Independent  Supine to Sit: Independent  Sit to Supine: Independent  Scooting: Independent  Transfers:  Sit to Stand: Independent  Stand to Sit: Independent  Stand Pivot Transfers: Independent     Bed to Chair: Independent              Balance:   Sitting: Intact  Standing: Intact; Without support  Ambulation/Gait Training:  Distance (ft): 200 Feet (ft)     Ambulation - Level of Assistance: Independent     Gait Description (WDL): Within defined limits                                              Stairs:  Number of Stairs Trained: 7  Stairs - Level of Assistance: Independent   Rail Use: Both     Therapeutic Exercises:    Instructed patient to continue active range of motion exercise on both legs while up on chair or on bed. Functional Measure:  Olivier Balance Test:    Sitting to Standin  Standing Unsupported: 4  Sitting with Back Unsupported: 4  Standing to Sittin  Transfers: 4  Standing Unsupported with Eyes Closed: 4  Standing Unsupported with Feet Together: 4  Reach Forward with Outstretched Arm: 4   Object: 4  Turn to Look Over Shoulders: 4  Turn 360 Degrees: 4  Alternate Foot on Step/Stool: 4  Standing Unsupported One Foot in Front: 4  Stand on One Le  Total: 56         56=Maximum possible score;   0-20=High fall risk  21-40=Moderate fall risk   41-56=Low fall risk     Olivier Balance Test and G-code impairment scale:  Percentage of Impairment CH    0%   CI    1-19% CJ    20-39% CK    40-59% CL    60-79% CM    80-99% CN     100%   Olivier   Score 0-56 56 45-55 34-44 23-33 12-22 1-11 0         G codes:   In compliance with CMSs Claims Based Outcome Reporting, the following G-code set was chosen for this patient based on their primary functional limitation being treated: The outcome measure chosen to determine the severity of the functional limitation was the [employer] test with a score of 56/56 which was correlated with the impairment scale. ? Mobility - Walking and Moving Around:     - CURRENT STATUS: CH - 0% impaired, limited or restricted    - GOAL STATUS: CH - 0% impaired, limited or restricted    - D/C STATUS:  CH - 0% impaired, limited or restricted        Physical Therapy Evaluation Charge Determination   History Examination Presentation Decision-Making   LOW Complexity : Zero comorbidities / personal factors that will impact the outcome / POC LOW Complexity : 1-2 Standardized tests and measures addressing body structure, function, activity limitation and / or participation in recreation  LOW Complexity : Stable, uncomplicated  Other outcome measures donis balance test  LOW       Based on the above components, the patient evaluation is determined to be of the following complexity level: LOW     Pain:  Pain Scale 1: Numeric (0 - 10)  Pain Intensity 1: 0     Activity Tolerance:   Good. Please refer to the flowsheet for vital signs taken during this treatment. After treatment:   [x]   Patient left in no apparent distress sitting up in chair  []   Patient left in no apparent distress in bed  [x]   Call bell left within reach  [x]   Nursing notified  []   Caregiver present  []   Bed alarm activated    COMMUNICATION/EDUCATION:   Communication/Collaboration:  [x]   Fall prevention education was provided and the patient/caregiver indicated understanding. [x]   Patient/family have participated as able and agree with findings and recommendations. []   Patient is unable to participate in plan of care at this time.   Findings and recommendations were discussed with: Occupational Therapist, Registered Nurse and patient    Thank you for this referral.  Gabrielle Ohara PT,WCC.    Time Calculation: 25 mins

## 2017-03-26 NOTE — PROGRESS NOTES
0730 Bedside and Verbal shift change report given to Abdiel Hernandez (oncoming nurse) by Maria Esther Katz (offgoing nurse). Report included the following information SBAR, Kardex and MAR.

## 2017-03-26 NOTE — PROGRESS NOTES
Neuro:  Afebrile 175/91. Alert fluent articulate and exam ok except for previously noted LUE weakness of 4 to 4+ quality to exclusion of sensory or reflex changes etc... MRI confirms R distal MCA division ischemic stroke picture and MRA doppler and echo pending. On ASA and statin and to see therapy soon. Nothing else to add and patient without complaints. If rest of testing ok, could be discharged and follow up with us at [facility] in next month or so.  Want to make sure therapy is in picture and f/u with primary care too. jose

## 2017-03-26 NOTE — PROGRESS NOTES
Problem: Patient Education: Go to Patient Education Activity  Goal: Patient/Family Education  OCCUPATIONAL THERAPY NEUROLOGICAL EVALUATION WITH DISCHARGE  Patient: Aletha Berman (61 y.o. male)  Date: 3/26/2017  Primary Diagnosis: weakness  Acute CVA (cerebrovascular accident) [facility])        Precautions:          ASSESSMENT:  Based on the objective data described below, the patient presents with continued left UE weakness/decreased gross and fine motor coordination, however reporting it is much improved. Educated patient and then his wife/family extensively on risk factors to CVA (contollable and uncontrollable), BEFAST, benefit of taking blood pressure daily at home, issued fine and gross motor coordination exercises and strengthening exercises. At this time on target for discharge. Informed patient if he felt left UE was not making continued progress could get order for Outpatient OT, however feel he will make good progress and likely not need. Patient's wife asked about driving and informed her that with Dx of CVA he is not to drive x's 6 months. Recommended to follow up with MD also. Further skilled acute occupational therapy is not indicated at this time. Discharge Recommendations: None  Further Equipment Recommendations for Discharge: none       SUBJECTIVE:   Patient stated Do you have something I can look at.  stated when educating on various coordination exercises      OBJECTIVE DATA SUMMARY:   HISTORY:   Past Medical History:   Diagnosis Date    Hypercholesteremia      Hypertension      Obesity (BMI 30-39.9) 3/25/2017     Past Surgical History:   Procedure Laterality Date    HX ORTHOPAEDIC         knees        Prior Level of Function/Home Situation: independent, hx of pinched cervical nerve with numbness/tingling in left UE.  Did not tell wife when symptoms began, left [place] early and flew home with his wife  Expanded or extensive additional review of patient history:      [address] [place] [address] Environment: Private residence  # Steps to Enter: 2  One/Two Story Residence: Two story  # of Interior Steps: 12  Height of Each Step (in): 1 inches  Interior Rails: Both  Lift Chair Available: No  Living Alone: No  Support Systems: Child(dasha), [employer]ian[employer] / ellis community, Family member(s), Friends \ neighbors  Patient Expects to be Discharged to[de-identified] Private residence  Current DME Used/Available at Home: None  [ ]  Right hand dominant   [ ]  Left hand dominant     EXAMINATION OF PERFORMANCE DEFICITS:  Cognitive/Behavioral Status:  Neurologic State: Alert  Orientation Level: Oriented X4  Cognition: Follows commands; Appropriate for age attention/concentration; Appropriate safety awareness  Perception: Appears intact  Perseveration: No perseveration noted  Safety/Judgement: Awareness of environment; Fall prevention;Home safety     Skin: intact     Edema:  none     Hearing: Auditory  Auditory Impairment: None     Vision/Perceptual:                                      Range of Motion:  AROM: Within functional limits  PROM: Within functional limits                       Strength:  Strength: Within functional limits (mild weakness on left  4+/5)                 Coordination:  Coordination: Within functional limits  Fine Motor Skills-Upper: Left Impaired;Right Intact    Gross Motor Skills-Upper: Left Impaired;Right Intact     Tone & Sensation:   Tone: normal  Sensation: left UE impaired, however improved and numbness dorsum left hand at digits 1 and 2 only, versus entire UE      Balance:  Sitting: Intact  Standing: Intact; Without support     Functional Mobility and Transfers for ADLs:  Bed Mobility:  Rolling: Independent  Supine to Sit: Independent  Sit to Supine: Independent  Scooting: Independent     Transfers:  Sit to Stand: Independent  Functional Transfers  Toilet Transfer : Independent     ADL Assessment:  Feeding: Modified independent     Oral Facial Hygiene/Grooming: Modified Independent     Bathing: Supervision Upper Body Dressing: Independent     Lower Body Dressing: Supervision; Additional time (instructed to limit forward bending)     Toileting: Independent                 ADL Intervention and task modifications:   instructed to prevent  Extreme forward flexion for a few days to prevent increased cranial pressure. Thorough instruction on CVA vs TIA, risk factors, prevention, BE-FAST, potential TPA and steps taken once in ER     Cognitive Retraining  Safety/Judgement: Awareness of environment; Fall prevention;Home safety     Neuromuscualar Re-education  Instructed in gross and fine motor coordination exercises, issued handouts, educated on benefit of continued and increased functional use, benefit of repetition and principles of neuroplasticity. Functional Measure:   Fugl-Navas Assessment of Motor Recovery after Stroke:       Reflex Activity  Flexors/Biceps/Fingers: Can be elicited  Extensors/Triceps: Can be elicited  Reflex Subtotal: 4     Volitional Movement Within Synergies  Shoulder Retraction: Full  Shoulder Elevation: Full  Shoulder Abduction (90 degrees): Full  Shoulder External Rotation: Full  Elbow Flexion: Full  Forearm Supination: Full  Shoulder Adduction/Internal Rotation: Full  Elbow Extension: Full  Forearm Pronation: Full  Subtotal: 18     Volitional Movement Mixing Synergies  Hand to Lumbar Spine: Full  Shoulder Flexion (0-90 degrees): Full  Pronation-Supination: Full  Subtotal: 6     Volitional Movement With Little or No Synergy  Shoulder Abduction (0-90 degrees): Full  Shoulder Flexion ( degrees): Partial (hx of left UE shoulder deficits)  Pronation/Supination: Full  Subtotal : 5     Normal Reflex Activity  Biceps, Triceps, Finger Flexors:  Full  Subtotal : 2     Upper Extremity Total   Upper Extremity Total: 35     Wrist  Stability at 15 Degree Dorsiflexion: Full  Repeated Dorsiflexion/ Volar Flexion: Full  Stability at 15 Degree Dorsiflexion: Full  Repeated Dorsiflexion/ Volar Flexion: Full  Circumduction: Full  Wrist Total: 10     Hand  Mass Flexion: Full  Mass Extension: Full  Grasp A: Full  Grasp B: Full  Grasp C: Full  Grasp D: Full  Grasp E: Full  Hand Total: 14     Coordination/Speed  Tremor: Slight  Dysmetria: Slight  Time: 2-5s  Coordination/Speed Total : 3     Total A-D  Total A-D (Motor Function): 62/66      Percentage of impairment CH  0% CI  1-19% CJ  20-39% CK  40-59% CL  60-79% CM  80-99% CN  100%   Fugl-Navas score: 0-66 66 53-65 39-52 26-38 13-25 1-12    0      This is a reliable/valid measure of arm function after a neurological event. It has established value to characterize functional status and for measuring spontaneous and therapy-induced recovery; tests proximal and distal motor functions. Fugl-Navas Assessment  UE scores recorded between five and 30 days post neurologic event can be used to predict UE recovery at six months post neurologic event. Severe = 0-21 points   Moderately Severe = 22-33 points   Moderate = 34-47 points   Mild = 48-66 points  ELLEN Gaona, GIN Sutton, & SHANNAN Ashraf (1992). Measurement of motor recovery after stroke: Outcome assessment and sample size requirements. Stroke, 23, pp. 3611-1191.   ------------------------------------------------------------------------------------------------------------------------------------------------------------------  MCID:  Stroke:   Surjit Ceron, 2001; n = 171; mean age 79 (5) years; assessed within 16 (12) days of stroke, Acute Stroke)  FMA Motor Scores from Admission to Discharge   10 point increase in FMA Upper Extremity = 1.5 change in discharge FIM   10 point increase in FMA Lower Extremity = 1.9 change in discharge FIM  MDC:   Stroke:   Jose Miguel Stout et al, 2008, n = 14, mean age = 59.9 (14.6) years, assessed on average 14 (6.5) months post stroke, Chronic Stroke)   FMA = 5.2 points for the Upper Extremity portion of the assessment      G codes:   In compliance with CMSs Claims Based Outcome Reporting, the following G-code set was chosen for this patient based on their primary functional limitation being treated: The outcome measure chosen to determine the severity of the functional limitation was the Fugl-Navas with a score of 62/66 which was correlated with the impairment scale. · Self Care:               - CURRENT STATUS:    CI - 1%-19% impaired, limited or restricted               - GOAL STATUS:           CI - 1%-19% impaired, limited or restricted               - D/C STATUS:                       CI - 1%-19% impaired, limited or restricted     Occupational Therapy Evaluation Charge Determination   History Examination Decision-Making   LOW Complexity : Brief history review  LOW Complexity : 1-3 performance deficits relating to physical, cognitive , or psychosocial skils that result in activity limitations and / or participation restrictions  LOW Complexity : No comorbidities that affect functional and no verbal or physical assistance needed to complete eval tasks       Based on the above components, the patient evaluation is determined to be of the following complexity level: LOW      Pain:  Pain Scale 1: Numeric (0 - 10)  Pain Intensity 1: 0              Activity Tolerance:   Good   Please refer to the flowsheet for vital signs taken during this treatment. After treatment:   [X]  Patient left in no apparent distress sitting up in chair  [ ]  Patient left in no apparent distress in bed  [X]  Call bell left within reach  [X]  Nursing notified  [ ]  Caregiver present  [ ]  Bed alarm activated      COMMUNICATION/EDUCATION:   Findings and recommendations were discussed with: Physical Therapist and Registered Nurse     Patient was educated regarding His deficit(s) of left UE decreased coordination as this relates to His diagnosis of CVA. He demonstrated Good understanding as evidenced by verbalizing understanding.      Patient and/or family was verbally educated on the BE FAST acronym for signs/symptoms of CVA and TIA. BE FAST was written on patient's communication board  for visual education and reinforcement. All questions answered with patient indicating good  understanding.      [X]      Home safety education was provided and the patient/caregiver indicated understanding. [X]      Patient/family have participated as able and agree with findings and recommendations. [ ]      Patient is unable to participate in plan of care at this time. This patients plan of care is appropriate for delegation to KATHY.      Thank you for this referral.  Karla Andrews, OTR/L  Time Calculation: 66 mins

## 2017-03-26 NOTE — PROCEDURES
[facility]  *** FINAL REPORT ***    Name: Bambi Yoon  MRN: [id number]    Outpatient  : 1950  HIS Order #: [id number]  [address] [facility] Visit #: [id number]  Date: 26 Mar 2017    TYPE OF TEST: Cerebrovascular Duplex    REASON FOR TEST  Other speech disturbance, Hemiplegia, unspecified, Left Arm Weakness    Right Carotid:-             Proximal               Mid                 Distal  cm/s  Systolic  Diastolic  Systolic  Diastolic  Systolic  Diastolic  CCA:     39.2      14.1                            86.6      15.4  Bulb:  ECA:    100.4       8.5  ICA:     58.1      14.5       71.0      23.7       53.4      19.3  ICA/CCA:  0.7       0.9    ICA Stenosis: <50%    Right Vertebral:-  Finding: Antegrade  Sys:       40.3  Josselyn:       12.9    Right Subclavian:    Left Carotid:-            Proximal                Mid                 Distal  cm/s  Systolic  Diastolic  Systolic  Diastolic  Systolic  Diastolic  CCA:    486.9      17.1                            74.3      13.8  Bulb:  ECA:     85.3      12.8  ICA:     67.7      18.2       79.8      24.8       84.2      22.6  ICA/CCA:  0.9       1.3    ICA Stenosis: <50%    Left Vertebral:-  Finding: Antegrade  Sys:       26.5  Josselyn:       10.7    Left Subclavian:    INTERPRETATION/FINDINGS  PROCEDURE:  Evaluation of the extracranial cerebrovascular arteries  with ultrasound (B-mode imaging, pulsed Doppler, color Doppler). Includes the common carotid, internal carotid, external carotid, and  vertebral arteries. FINDINGS: Mild heterogeneous plaque in the bulb and right ICA. Mild  homogenous plaque noted in right ECA. Mild irregular hyperechoic  plaque in the bulb and left ICA. IMPRESSION: Findings are consistent with 0-49% stenosis of the right  internal carotid and 0-49% stenosis of the left internal carotid. Vertebrals are patent with antegrade flow.     ADDITIONAL COMMENTS    I have personally reviewed the data relevant to the interpretation of  this  study. TECHNOLOGIST: YANIV Evans  Signed: 03/26/2017 11:11 AM    PHYSICIAN: Manuelito Larsen.  Dayana Brewer MD  Signed: 03/27/2017 03:00 PM

## 2017-03-27 VITALS
SYSTOLIC BLOOD PRESSURE: 166 MMHG | DIASTOLIC BLOOD PRESSURE: 83 MMHG | RESPIRATION RATE: 16 BRPM | OXYGEN SATURATION: 96 % | HEIGHT: 73 IN | HEART RATE: 66 BPM | BODY MASS INDEX: 32.02 KG/M2 | TEMPERATURE: 98.6 F | WEIGHT: 241.6 LBS

## 2017-03-27 PROCEDURE — 74011250637 HC RX REV CODE- 250/637: Performed by: INTERNAL MEDICINE

## 2017-03-27 RX ORDER — ATORVASTATIN CALCIUM 20 MG/1
80 TABLET, FILM COATED ORAL DAILY
Status: DISCONTINUED | OUTPATIENT
Start: 2017-03-28 | End: 2017-03-27 | Stop reason: HOSPADM

## 2017-03-27 RX ORDER — CLOPIDOGREL BISULFATE 75 MG/1
75 TABLET ORAL DAILY
Status: DISCONTINUED | OUTPATIENT
Start: 2017-03-27 | End: 2017-03-27

## 2017-03-27 RX ADMIN — ATORVASTATIN CALCIUM 40 MG: 20 TABLET, FILM COATED ORAL at 09:40

## 2017-03-27 RX ADMIN — ATENOLOL 12.5 MG: 25 TABLET ORAL at 09:40

## 2017-03-27 RX ADMIN — ASPIRIN 81 MG CHEWABLE TABLET 81 MG: 81 TABLET CHEWABLE at 09:40

## 2017-03-27 RX ADMIN — LORATADINE 10 MG: 10 TABLET ORAL at 09:40

## 2017-03-27 NOTE — PROGRESS NOTES
Pt is being dc home today, had a brief discussion as pt is ready to leave. His wife is here to transport him home. Pt has prescription coverage under his insurance plan. Pt's PCP is Dr. Meli Harris. No case management needs identified. Thanks Marcus Aldana MSW   Care Management Interventions  PCP Verified by CM:  Yes  Debrat Signup: No  Discharge Durable Medical Equipment: No  Physical Therapy Consult: Yes  Occupational Therapy Consult: Yes  Speech Therapy Consult: No  Current Support Network: Lives with Spouse  Confirm Follow Up Transport: Family  Plan discussed with Pt/Family/Caregiver: Yes  Discharge Location  Discharge Placement: Home

## 2017-03-27 NOTE — DISCHARGE SUMMARY
Physician Discharge Summary     Patient ID:  Raleigh Angulo  [id number]  74 y.o.  1950    Admit date: 3/25/2017    Discharge date: 3/27/2017    Admission Diagnoses: weakness  Acute CVA (cerebrovascular accident) [facility])    Discharge Diagnoses:  Principal Diagnosis Acute CVA (cerebrovascular accident) ([facility]                                            Principal Problem:    Acute CVA (cerebrovascular accident) ([facility] (3/26/2017)    Active Problems:    High cholesterol (3/25/2017)      HTN (hypertension) (3/25/2017)      Obesity (BMI 30-39.9) (3/25/2017)         Resolved Problems:  Problem List as of 3/27/2017  Never Reviewed          Codes Class Noted - Resolved    * (Principal)Acute CVA (cerebrovascular accident) ([facility] ICD-10-CM: I63.9  ICD-9-CM: 434.91  3/26/2017 - Present        High cholesterol (Chronic) ICD-10-CM: E78.00  ICD-9-CM: 272.0  3/25/2017 - Present        HTN (hypertension) (Chronic) ICD-10-CM: I10  ICD-9-CM: 401.9  3/25/2017 - Present        Obesity (BMI 30-39.9) (Chronic) ICD-10-CM: E66.9  ICD-9-CM: 278.00  3/25/2017 - Present                Hospital Course:   Mr. Americo Lund was admitted to the Hospitalist Service on the 3rd floor for treatment of CVA. He underwent MRI which confirmed multiple acute embolic CVAs in the right MCA distribution. He was evaluated by Neurology and MRA, carotidsn and echocardiogram were unremarkable. Lipid panel:  Results for Santos Fiore (MRN [id number]) as of 3/27/2017 10:14   Ref. Range 3/26/2017 05:30   Triglyceride Latest Ref Range: <150 MG/   Cholesterol, total Latest Ref Range: <200 MG/   HDL Cholesterol Latest Units: MG/DL 45   CHOL/HDL Ratio Latest Ref Range: 0 - 5.0   4.2   LDL, calculated Latest Ref Range: 0 - 100 MG/.6 (H)   VLDL, calculated Latest Units: MG/DL 28.4     His statin dose was increased. He remained hypertensive and was started on atenolol. He was cleared by all therapies.   He was discharged home on 3/27/2017 in improved condition. PCP: Irena Ley MD    Consults: Neurology    Discharge Exam:  See my Progress Note from today. Disposition: home    Patient Instructions:   Current Discharge Medication List      START taking these medications    Details   aspirin 81 mg chewable tablet Take 1 Tab by mouth daily. Indications: Acute Coronary Syndrome      atenolol (TENORMIN) 25 mg tablet Take 0.5 Tabs by mouth daily. Qty: 30 Tab, Refills: 0         CONTINUE these medications which have CHANGED    Details   atorvastatin (LIPITOR) 40 mg tablet Take 1 Tab by mouth daily. Qty: 30 Tab, Refills: 0         CONTINUE these medications which have NOT CHANGED    Details   loratadine (CLARITIN) 10 mg tablet Take 10 mg by mouth daily. STOP taking these medications       naproxen sodium (ALEVE) 220 mg tablet Comments:   Reason for Stopping:              Activity: Activity as tolerated  Diet: Cardiac Diet  Wound Care: None needed    Follow-up Information     Follow up With Details Comments Contact Info    Kym Edmonds MD In 2 weeks  [address] [facility]      Kajal Manriquez MD  in 4-6 weeks for stroke follow up [address] [place] [address]  [phone]            35 minutes were spend on this discharge.     Signed:  Parish Lindsey MD  3/27/2017  10:13 AM

## 2017-03-27 NOTE — PROGRESS NOTES
Speech therapy    Orders received and chart reviewed. Patient initially with slurred speech, though this has resolved. Passed STAND, regular diet is ordered. Will complete SLP orders. Thank you. Bree Funez M.S., CCC-SLP

## 2017-03-27 NOTE — PROGRESS NOTES
Pharmacist Discharge Medication Reconciliation    Discharge Provider:  Diane Love MD       Discharge Medications:      Current Discharge Medication List        START taking these medications         Dose & Instructions Dispensing Information Comments   Morning Noon Evening Bedtime      aspirin 81 mg chewable tablet       Your last dose was: Your next dose is:              Dose:  81 mg   Take 1 Tab by mouth daily. Indications: Acute Coronary Syndrome    Refills:  0                         atenolol 25 mg tablet   Commonly known as:  TENORMIN       Your last dose was: Your next dose is:              Dose:  12.5 mg   Take 0.5 Tabs by mouth daily. Quantity:  30 Tab   Refills:  0                               CONTINUE these medications which have CHANGED         Dose & Instructions Dispensing Information Comments   Morning Noon Evening Bedtime      atorvastatin 40 mg tablet   Commonly known as:  LIPITOR   What changed:    - medication strength  - how much to take       Your last dose was: Your next dose is:              Dose:  40 mg   Take 1 Tab by mouth daily. Quantity:  30 Tab   Refills:  0                               CONTINUE these medications which have NOT CHANGED         Dose & Instructions Dispensing Information Comments   Morning Noon Evening Bedtime      CLARITIN 10 mg tablet   Generic drug:  loratadine       Your last dose was: Your next dose is:              Dose:  10 mg   Take 10 mg by mouth daily. Refills:  0                               STOP taking these medications              ALEVE 220 mg tablet   Generic drug:  naproxen sodium                    Where to Get Your Medications        Information on where to get these meds will be given to you by the nurse or doctor. !  Ask your nurse or doctor about these medications     atenolol 25 mg tablet    atorvastatin 40 mg tablet                The patient's chart, MAR, and AVS were reviewed by   Leida Barahona [facility] [place],   Terrence Hunter 23: [phone]

## 2017-03-27 NOTE — PROGRESS NOTES
[facility] Neurology  [place] [address] [address] [place] [address]  [phone]     Inpatient Neurology Progress Note  [facility]    Name:   Vivi Mukherjee record #: [id number]  Admission Date: 3/25/2017  Date:   03/27/17  _____________________________________________________________________________    Subjective:  CC:  When am I going home  ·  denies having any continued s/s post CVA  · No OT or PT recommendations post discharge  Denies:   Tremors, recent falls, NV, fever  _____________________________________________________________________________  Objective  Patient Vitals for the past 12 hrs:   Temp Pulse Resp BP SpO2   03/27/17 0940 - 66 - 166/83 -   03/27/17 0736 98.6 °F (37 °C) 69 16 137/73 96 %   03/27/17 0700 - 60 - - -   03/27/17 0400 97.7 °F (36.5 °C) 72 16 175/90 95 %   03/27/17 0131 - 75 - - -     Allergies:    Allergies   Allergen Reactions    Other Medication Hives     All mycin medication groups/family      Inpatient Meds    Current Facility-Administered Medications:     atorvastatin (LIPITOR) tablet 40 mg, 40 mg, Oral, DAILY, Alison Thompson MD, 40 mg at 03/27/17 0940    atenolol (TENORMIN) tablet 12.5 mg, 12.5 mg, Oral, DAILY, Alison Thompson MD, 12.5 mg at 03/27/17 0940    sodium chloride (NS) flush 5-10 mL, 5-10 mL, IntraVENous, Q8H, Alison Thompson MD, 10 mL at 03/26/17 1329    sodium chloride (NS) flush 5-10 mL, 5-10 mL, IntraVENous, PRN, Alison Thompson MD    ondansetron [facility]) injection 4 mg, 4 mg, IntraVENous, Q6H PRN, Alison Thompson MD    labetalol (NORMODYNE;TRANDATE) injection 5 mg, 5 mg, IntraVENous, Q10MIN PRN, Alison Thompson MD    acetaminophen (TYLENOL) tablet 650 mg, 650 mg, Oral, Q4H PRN **OR** acetaminophen (TYLENOL) solution 650 mg, 650 mg, Per NG tube, Q4H PRN **OR** acetaminophen (TYLENOL) suppository 650 mg, 650 mg, Rectal, Q4H PRN, Alison Thompson MD    enoxaparin (LOVENOX) injection 40 mg, 40 mg, SubCUTAneous, Q24H, Alison Thompson MD, 40 mg at 03/26/17 1228    aspirin chewable tablet 81 mg, 81 mg, Oral, DAILY, Alonso Pagan MD, 81 mg at 03/27/17 0940    loratadine (CLARITIN) tablet 10 mg, 10 mg, Oral, DAILY, Alonso Pagan MD, 10 mg at 03/27/17 0940  Labs Reviewed  No results found for this or any previous visit (from the past 12 hour(s)). Imaging  Reviewed:   CT Results (recent):    Results from Hospital Encounter encounter on 03/25/17   CT HEAD WO CONT   Narrative EXAM:  CT HEAD WO CONT    INDICATION:   left arm weakness with slurred speech on Thursday. COMPARISON: None. TECHNIQUE: Unenhanced CT of the head was performed using 5 mm images. Brain and  bone windows were generated. CT dose reduction was achieved through use of a  standardized protocol tailored for this examination and automatic exposure  control for dose modulation. FINDINGS:  The ventricles and sulci are normal in size, shape and configuration and  midline. Minimal low density periventricular white matter. There is no  intracranial hemorrhage, extra-axial collection, mass, mass effect or midline  shift. The basilar cisterns are open. No acute infarct is identified. The bone  windows demonstrate no abnormalities. The visualized portions of the paranasal  sinuses and mastoid air cells are clear. Impression IMPRESSION:    No acute intracranial abnormality      MRI Results (recent): FINDINGS: 3/25/17  The ventricles and cisterns are of normal size and configuration. There are no  extra-axial fluid collections, mass lesions or mass effect. Mild low-density  periventricular white matter. There are multiple small acute cortical and  subcortical infarction in the distal right MCA perfusion in the right mid and  posterior frontal lobe. . There is no acute or chronic intracranial hemorrhage. The major intracranial vascular flow-voids are patent.  Marrow signal is normal.     IMPRESSION:  1. Multiple acute cortical and subcortical infarcts in the distal right MCA  distribution of the right mid and posterior frontal lobe    Physical Exam  General:   Obese White male  Chest:  Regular rate rhythm and rate, clear BBS  Neurologic: pleasant, cooperative  Eyes:  Tracking appropriately and no ptosis  Speech:   No Aphasia  or dysarthria    Mentation:  Awake, alert      Orientation:  x3   Strength: Equal bilaterally,  proximally & distally  5/5   Mild dysmetria Left FNF and L heel to shin, R FNF and Heel to shin intact  Sensation:  Equal throughout, with LT and temp    _____________________________________________________________________________  Assessment:   1.  acute CVA:  R frontal, cortical and sub-cortical infarcts  2. HTN  3.  hyperlipidemia- goal < 70  4.  obesity    Plan  · Continue ASA--none PTA and increase Lipitor for LDL > 70  · PT/OT to see patient and eval for rehab  · Will have OP neuro appt in clinic  · Educated on BEFAST and CVA s/s and potential re-occurance  ·  testing- no PFO, carotids 0-49% bilat  ·  Testing results discussed with patient and family --- any questions were answered. Stable for discharge  My collaborating care team physician may have further recommendations.     On DVT Prophylaxis yes no   Continue  lovenox while inpatient x      Care Plan discussed with:  Patient x   Family-- wife and daughter x   RN    Care Manager    Consultant/Specialist:     Patient will be discussed with Dr. Laureen Felty Problems  Never Reviewed          Codes Class Noted POA    * (Principal)Acute CVA (cerebrovascular accident) [facility]) ICD-10-CM: I63.9  ICD-9-CM: 434.91  3/26/2017 Unknown        High cholesterol (Chronic) ICD-10-CM: E78.00  ICD-9-CM: 272.0  3/25/2017 Yes        HTN (hypertension) (Chronic) ICD-10-CM: I10  ICD-9-CM: 401.9  3/25/2017 Yes        Obesity (BMI 30-39.9) (Chronic) ICD-10-CM: E66.9  ICD-9-CM: 278.00  3/25/2017 Yes            ________________________________________________   Quyen Tyler [facility]  03/27/17  ================================================    ADDENDUM--> Collaborating Care Team Physician:

## 2017-03-27 NOTE — PROGRESS NOTES
Problem: Falls - Risk of  Goal: *Absence of falls  Outcome: Progressing Towards Goal  Bed wheels locked, bed in low position. Side rails x 3. Call bell and possessions within reach. Family at bedside. Asked patient to call for assistance. Patient verbalized understanding. [facility] orders received from [facility] for plavix 75 mg. Discharge orders do not show this medication or a new prescription. Spoke with [place]. Requested that admitting physician update discharge order to include same. Spoke with Dr. Tea Green. Questioning why to start plavix. [facility] will speak with Dr. Tea Green directly. Awaiting orders.

## 2017-03-27 NOTE — DISCHARGE INSTRUCTIONS
HOSPITALIST DISCHARGE INSTRUCTIONS  NAME: Khalida Castillo   :  1950   MRN:  [id number]     Date/Time:  3/27/2017 8:12 AM    ADMIT DATE: 3/25/2017     DISCHARGE DATE: 3/27/2017     DISCHARGE DIAGNOSIS:  Embolic stroke - Multiple acute cortical and subcortical infarcts in the distal right MCA  distribution of the right mid and posterior frontal lobe    MEDICATIONS:  · It is important that you take the medication exactly as they are prescribed. · Keep your medication in the bottles provided by the pharmacist and keep a list of the medication names, dosages, and times to be taken in your wallet. · Do not take other medications without consulting your doctor. Pain Management: per above medications    What to do at Home    Recommended diet:  Cardiac Diet    Recommended activity: Activity as tolerated    Neurology said that a cardiologist will contact you and make arrangements for a cardiac event monitor to be sent to you. Further instructions will be given at that time. If you experience any of the following symptoms then please call your primary care physician or return to the emergency room if you cannot get hold of your doctor:  Fever, chills, nausea, vomiting, diarrhea, change in mentation, falling, bleeding, shortness of breath    Follow Up: Follow-up Information     Follow up With Details Comments MD Lolita In 2 weeks  [address] [address] Tacho Chris MD  in 4-6 weeks for stroke follow up [address] [facility]  Vicenta Macario [phone]              Information obtained by :  I understand that if any problems occur once I am at home I am to contact my physician. I understand and acknowledge receipt of the instructions indicated above.                                                                                                                                            Physician's or R.N.'s Signature Date/Time                                                                                                                                              Patient or Representative Signature                                                          Date/Time

## 2017-03-27 NOTE — PROGRESS NOTES
2050 Dr Roxanne Barth informed writer that patient can be discharged. Patient states that his wife has poor vision and cannot drive in the dark, so patient prefers to go home in the AM. Patient spoke to Dr Roxanne Barth, and will be discharged in the morning. 0740 Bedside and Verbal shift change report given to [facility] (oncoming nurse) by Cassie Schneider (offgoing nurse). Report included the following information SBAR, Kardex and MAR.

## 2017-03-27 NOTE — CDMP QUERY
1.    The diagnosis of hypertension has been documented for this patient. In ICD-10, hypertension is an unspecified term. Based on the definitions listed below, could your documentation be further specified as:    =>Hypertensive emergency  =>Hypertensive urgency  =>Other Explanation of clinical findings  =>Unable to Determine (no explanation of clinical findings)    The medical record reflects the following clinical findings, treatment, and risk factors:  73 y/o male presents with L sided weakness, MRI work up shows an acute CVA. He has a history of hypertension. On admission hypertension is rendered as a diagnosis. His BP on admit 235/130. He is treated with IV labetolol 10  mg x 1 and started on po atenolol. Please clarify and document your clinical opinion in the progress notes and discharge summary including the definitive and/or presumptive diagnosis, (suspected or probable), related to the above clinical findings. Please include clinical findings supporting your diagnosis.    -----------------------------------------------  Hypertensive Emergency: SBP >180 or DBP > 120 with associated organ dysfunction (e.g. CVA, LOC, memory loss, MI, THERON, aortic dissection, angina, pulmonary edema, etc.)    Hypertensive Urgency: SBP > 180 or DBP > 110 with or without symptoms (e.g. severe HA, SOB, nosebleed, severe anxiety, etc.)    Thanks for your time.     Natanael Queen RN, BSN  [phone]

## 2017-03-27 NOTE — PROGRESS NOTES
Pranav Palacioselsen [address] [place] [address]  [address] [facility], [address] [place] [address]  [phone]      Medical Progress Note      NAME: Josette Cotton   :  1950  MRM:  [id number]    Date/Time: 3/27/2017  7:55 AM          Subjective:     Chief Complaint:  Weakness: left arm, minimal, constant, better than at admission, no longer associated with numbness    ROS:  (bold if positive, if negative)                        Tolerating Diet  Tolerating therapy          Objective:       Vitals:          Last 24hrs VS reviewed since prior progress note.  Most recent are:    Visit Vitals    /90 (BP 1 Location: Right arm, BP Patient Position: At rest)    Pulse 72    Temp 97.7 °F (36.5 °C)    Resp 16    Ht 6' 1\" (1.854 m)    Wt 109.6 kg (241 lb 9.6 oz)    SpO2 95%    BMI 31.88 kg/m2     SpO2 Readings from Last 6 Encounters:   17 95%            Intake/Output Summary (Last 24 hours) at 17 0755  Last data filed at 17 0533   Gross per 24 hour   Intake              750 ml   Output              800 ml   Net              -50 ml          Exam:     Physical Exam:    Gen:  Well-developed, well-nourished, in no acute distress  HEENT:  Pink conjunctivae, PERRL, hearing intact to voice, moist mucous membranes  Neck:  Supple, without masses, thyroid non-tender  Resp:  No accessory muscle use, clear breath sounds without wheezes rales or rhonchi  Card:  No murmurs, normal S1, S2 without thrills, bruits or peripheral edema  Abd:  Soft, non-tender, non-distended, normoactive bowel sounds are present, no palpable organomegaly and no detectable hernias  Lymph:  No cervical or inguinal adenopathy  Musc:  No cyanosis or clubbing  Skin:  No rashes or ulcers, skin turgor is good  Neuro:  Cranial nerves are grossly intact, no focal motor weakness (I can't detect any weakness on exam), follows commands appropriately  Psych:  Good insight, oriented to person, place and time, alert       Telemetry reviewed: normal sinus rhythm    Medications Reviewed: (see below)    Lab Data Reviewed: (see below)    ______________________________________________________________________    Medications:     Current Facility-Administered Medications   Medication Dose Route Frequency    atorvastatin (LIPITOR) tablet 40 mg  40 mg Oral DAILY    atenolol (TENORMIN) tablet 12.5 mg  12.5 mg Oral DAILY    sodium chloride (NS) flush 5-10 mL  5-10 mL IntraVENous Q8H    sodium chloride (NS) flush 5-10 mL  5-10 mL IntraVENous PRN    ondansetron (ZOFRAN) injection 4 mg  4 mg IntraVENous Q6H PRN    labetalol (NORMODYNE;TRANDATE) injection 5 mg  5 mg IntraVENous Q10MIN PRN    acetaminophen (TYLENOL) tablet 650 mg  650 mg Oral Q4H PRN    Or    acetaminophen (TYLENOL) solution 650 mg  650 mg Per NG tube Q4H PRN    Or    acetaminophen (TYLENOL) suppository 650 mg  650 mg Rectal Q4H PRN    enoxaparin (LOVENOX) injection 40 mg  40 mg SubCUTAneous Q24H    aspirin chewable tablet 81 mg  81 mg Oral DAILY    loratadine (CLARITIN) tablet 10 mg  10 mg Oral DAILY            Lab Review:     Recent Labs      03/26/17   0530  03/25/17   1013   WBC  6.5  7.9   HGB  16.6  17.3*   HCT  48.0  51.9*   PLT  227  241     Recent Labs      03/26/17   0530  03/25/17   1023  03/25/17   1013   NA  141   --   139   K  4.1   --   4.2   CL  104   --   102   CO2  28   --   28   GLU  111*   --   124*   BUN  20   --   16   CREA  0.97   --   1.17   CA  8.6   --   8.8   MG  2.3   --    --    PHOS  4.2   --    --    ALB   --    --   3.9   TBILI   --    --   0.5   SGOT   --    --   18   ALT   --    --   21   INR   --   1.0   --      Lab Results   Component Value Date/Time    Glucose (POC) 113 03/25/2017 10:21 AM     No results for input(s): PH, PCO2, PO2, HCO3, FIO2 in the last 72 hours.   Recent Labs      03/25/17   1023   INR  1.0     No results found for: SDES  No results found for: CULT         Assessment:     Principal Problem:    Acute CVA (cerebrovascular accident) ([facility] (3/26/2017)    Active Problems:    High cholesterol (3/25/2017)      HTN (hypertension) (3/25/2017)      Obesity (BMI 30-39.9) (3/25/2017)           Plan:     Principal Problem:    Acute CVA (cerebrovascular accident) ([address] (3/26/2017)   - MRI with acute embolic CVA    - PT/OT cleared with no further needs   - continue ASA   - echo and carotids unremarkable    Active Problems:    High cholesterol (3/25/2017)   - LDL > 70, increased statin      HTN (hypertension) (3/25/2017)   - home on atenolol      Obesity (BMI 30-39.9) (3/25/2017)   - counseled on weight loss      Total time spent with patient: 35 minutes                  Care Plan discussed with: Patient and Nursing Staff    Discussed:  Code Status, Care Plan and D/C Planning    Prophylaxis:  Lovenox and SCD's    Disposition:  Home w/Family           ___________________________________________________    Attending Physician: Cong Hope MD

## 2017-03-27 NOTE — PROGRESS NOTES
Discharge instructions, including information on new medications, were reviewed with patient. All questions were answered. IV and heart monitor were removed. Patient stated that he has received the Stroke Binder and education. He also received a copy of his discharge papers and 2 prescriptions and will be discharged home with his wife. Neurology Maria Del Carmen Sers CHARY) will make arrangements for cardiology to send patient a 30-day cardiac event monitor.  This information was added to discharge instructions

## 2017-03-28 ENCOUNTER — OFFICE VISIT (OUTPATIENT)
Dept: CARDIOLOGY CLINIC | Age: 67
End: 2017-03-28

## 2017-03-28 DIAGNOSIS — Z86.73 HISTORY OF CVA (CEREBROVASCULAR ACCIDENT): Primary | ICD-10-CM

## 2017-04-04 ENCOUNTER — TELEPHONE (OUTPATIENT)
Dept: CARDIOLOGY CLINIC | Age: 67
End: 2017-04-04

## 2017-04-04 NOTE — TELEPHONE ENCOUNTER
Per Antonino Kirby, monitor ordered on 3/28. Left voicemail message with [employer] contact number for patient to verify address.

## 2017-04-04 NOTE — TELEPHONE ENCOUNTER
Patient called regarding the monitor her was supposed to receive. He stated he has not received anything yet.  Please give him a call at [phone]

## 2017-04-10 ENCOUNTER — OFFICE VISIT (OUTPATIENT)
Dept: NEUROLOGY | Age: 67
End: 2017-04-10

## 2017-04-10 VITALS
HEIGHT: 73 IN | DIASTOLIC BLOOD PRESSURE: 86 MMHG | BODY MASS INDEX: 31.89 KG/M2 | WEIGHT: 240.6 LBS | OXYGEN SATURATION: 97 % | TEMPERATURE: 98 F | RESPIRATION RATE: 18 BRPM | HEART RATE: 61 BPM | SYSTOLIC BLOOD PRESSURE: 144 MMHG

## 2017-04-10 DIAGNOSIS — Z86.73 HISTORY OF STROKE: Primary | ICD-10-CM

## 2017-04-10 RX ORDER — PRAVASTATIN SODIUM 20 MG/1
20 TABLET ORAL
COMMUNITY
End: 2018-04-20 | Stop reason: DRUGHIGH

## 2017-04-10 NOTE — MR AVS SNAPSHOT
Visit Information Date & Time Provider Department Dept. Phone Encounter #  
 4/10/2017 10:00 AM Constantine Ravi MD [facility]rology [facility] [id number] Follow-up Instructions Return if symptoms worsen or fail to improve. Your Appointments 5/3/2017  1:00 PM  
HOSPITAL DISCHARGE with Rossy Woody MD  
[facility] ([facility]) Appt Note: 5 wk hosp fu appt sll [address] [facility] [address] [facility]  
[address] [facility] [address] [address] [address] Upcoming Health Maintenance Date Due Hepatitis C Screening 1950 DTaP/Tdap/Td series (1 - Tdap) 7/13/1969 FOBT Q 1 YEAR AGE 50-75 7/13/1998 ZOSTER VACCINE AGE 60> 7/13/2008 GLAUCOMA SCREENING Q2Y 7/13/2013 Pneumococcal 65+ Low/Medium Risk (1 of 2 - PCV13) 7/13/2013 MEDICARE YEARLY EXAM 7/13/2013 INFLUENZA AGE 9 TO ADULT 8/1/2016 Allergies as of 4/10/2017  Review Complete On: 3/25/2017 By: Zoila Pardo RN Severity Noted Reaction Type Reactions Lipitor [Atorvastatin]  04/10/2017    Rash Macrolide Antibiotics  03/27/2017    Hives Niacin  04/10/2017    Rash Other Medication  03/25/2017    Hives All mycin medication groups/family Current Immunizations  Never Reviewed No immunizations on file. Not reviewed this visit You Were Diagnosed With   
  
 Codes Comments History of stroke    -  Primary ICD-10-CM: Z86.73 
ICD-9-CM: V12.54 Vitals BP Pulse Temp Resp Height(growth percentile) Weight(growth percentile) 144/86 61 98 °F (36.7 °C) (Oral) 18 6' 1\" (1.854 m) 240 lb 9.6 oz (109.1 kg) SpO2 BMI Smoking Status 97% 31.74 kg/m2 Former Smoker Vitals History BMI and BSA Data Body Mass Index Body Surface Area 31.74 kg/m 2 2.37 m 2 Your Updated Medication List  
  
   
 This list is accurate as of: 4/10/17 10:37 AM.  Always use your most recent med list.  
  
  
  
  
 aspirin 81 mg chewable tablet Take 1 Tab by mouth daily. Indications: Acute Coronary Syndrome  
  
 atenolol 25 mg tablet Commonly known as:  TENORMIN Take 0.5 Tabs by mouth daily. PRAVACHOL 20 mg tablet Generic drug:  pravastatin Take 20 mg by mouth nightly. Follow-up Instructions Return if symptoms worsen or fail to improve. Patient Instructions PRESCRIPTION REFILL POLICY [facility] Statement to Patients April 1, 2014 In an effort to ensure the large volume of patient prescription refills is processed in the most efficient and expeditious manner, we are asking our patients to assist us by calling your Pharmacy for all prescription refills, this will include also your  Mail Order Pharmacy. The pharmacy will contact our office electronically to continue the refill process. Please do not wait until the last minute to call your pharmacy. We need at least 48 hours (2days) to fill prescriptions. We also encourage you to call your pharmacy before going to  your prescription to make sure it is ready. With regard to controlled substance prescription refill requests (narcotic refills) that need to be picked up at our office, we ask your cooperation by providing us with at least 72 hours (3days) notice that you will need a refill. We will not refill narcotic prescription refill requests after 4:00pm on any weekday, Monday through Thursday, or after 2:00pm on Fridays, or on the weekends. We encourage everyone to explore another way of getting your prescription refill request processed using [employer], our patient web portal through our electronic medical record system.  [employer] is an efficient and effective way to communicate your medication request directly to the office and downloadable as an rosario on your smart phone . [employer] also features a review functionality that allows you to view your medication list as well as leave messages for your physician. Are you ready to get connected? If so please review the attatched instructions or speak to any of our staff to get you set up right away! Thank you so much for your cooperation. Should you have any questions please contact our Practice Administrator. The Physicians and Staff,  [facility] Dori Arndt [address] What is a living will? A living will is a legal form you use to write down the kind of care you want at the end of your life. It is used by the health professionals who will treat you if you aren't able to decide for yourself. If you put your wishes in writing, your loved ones and others will know what kind of care you want. They won't need to guess. This can ease your mind and be helpful to others. A living will is not the same as an estate or property will. An estate will explains what you want to happen with your money and property after you die. Is a living will a legal document? A living will is a legal document. Each state has its own laws about living ariza. If you move to another state, make sure that your living will is legal in the state where you now live. Or you might use a universal form that has been approved by many states. This kind of form can sometimes be completed and stored online. Your electronic copy will then be available wherever you have a connection to the Internet. In most cases, doctors will respect your wishes even if you have a form from a different state. · You don't need an  to complete a living will. But legal advice can be helpful if your state's laws are unclear, your health history is complicated, or your family can't agree on what should be in your living will. · You can change your living will at any time.  Some people find that their wishes about end-of-life care change as their health changes. · In addition to making a living will, think about completing a medical power of  form. This form lets you name the person you want to make end-of-life treatment decisions for you (your \"health care agent\") if you're not able to. Many hospitals and nursing homes will give you the forms you need to complete a living will and a medical power of . · Your living will is used only if you can't make or communicate decisions for yourself anymore. If you become able to make decisions again, you can accept or refuse any treatment, no matter what you wrote in your living will. · Your state may offer an online registry. This is a place where you can store your living will online so the doctors and nurses who need to treat you can find it right away. What should you think about when creating a living will? Talk about your end-of-life wishes with your family members and your doctor. Let them know what you want. That way the people making decisions for you won't be surprised by your choices. Think about these questions as you make your living will: · Do you know enough about life support methods that might be used? If not, talk to your doctor so you know what might be done if you can't breathe on your own, your heart stops, or you're unable to swallow. · What things would you still want to be able to do after you receive life-support methods? Would you want to be able to walk? To speak? To eat on your own? To live without the help of machines? · If you have a choice, where do you want to be cared for? In your home? At a hospital or nursing home? · Do you want certain Mandaeism practices performed if you become very ill? · If you have a choice at the end of your life, where would you prefer to die? At home? In a hospital or nursing home? Somewhere else? · Would you prefer to be buried or cremated? · Do you want your organs to be donated after you die? What should you do with your living will? · Make sure that your family members and your health care agent have copies of your living will. · Give your doctor a copy of your living will to keep in your medical record. If you have more than one doctor, make sure that each one has a copy. · You may want to put a copy of your living will where it can be easily found. Where can you learn more? Go to http://leandro-ana luisa.info/. Enter Z141 in the search box to learn more about \"Learning About Living Perrocolette. \" Current as of: February 24, 2016 Content Version: 11.2 © 6311-9824 [employer]. Care instructions adapted under license by [employer] (which disclaims liability or warranty for this information). If you have questions about a medical condition or this instruction, always ask your healthcare professional. [address] any warranty or liability for your use of this information. Patient will continue aspirin and pravastatin and atenolol and follow-up with primary care. Seems to have made a fantastic recovery. Is currently being followed by cardiology. Revisit as needed. Introducing [facility] HE[facility]! Dear [place]: Thank you for requesting a [employer] account. Our records indicate that you already have an active [employer] account. You can access your account anytime at [email or web address] Did you know that you can access your hospital and ER discharge instructions at any time in [employer]? You can also review all of your test results from your hospital stay or ER visit. Additional Information If you have questions, please visit the Frequently Asked Questions section of the [employer] website at [email or web address] Remember, [employer] is NOT to be used for urgent needs. For medical emergencies, dial 911. Now available from your iPhone and Android! Please provide this summary of care documentation to your next provider. Your primary care clinician is listed as Akosua Pizarro. If you have any questions after today's visit, please call [phone].

## 2017-04-10 NOTE — PATIENT INSTRUCTIONS
10 [facility]   Statement to Patients  April 1, 2014      In an effort to ensure the large volume of patient prescription refills is processed in the most efficient and expeditious manner, we are asking our patients to assist us by calling your Pharmacy for all prescription refills, this will include also your  Mail Order Pharmacy. The pharmacy will contact our office electronically to continue the refill process. Please do not wait until the last minute to call your pharmacy. We need at least 48 hours (2days) to fill prescriptions. We also encourage you to call your pharmacy before going to  your prescription to make sure it is ready. With regard to controlled substance prescription refill requests (narcotic refills) that need to be picked up at our office, we ask your cooperation by providing us with at least 72 hours (3days) notice that you will need a refill. We will not refill narcotic prescription refill requests after 4:00pm on any weekday, Monday through Thursday, or after 2:00pm on Fridays, or on the weekends. We encourage everyone to explore another way of getting your prescription refill request processed using [employer], our patient web portal through our electronic medical record system. [employer] is an efficient and effective way to communicate your medication request directly to the office and  downloadable as an rosario on your smart phone . [employer] also features a review functionality that allows you to view your medication list as well as leave messages for your physician. Are you ready to get connected? If so please review the attatched instructions or speak to any of our staff to get you set up right away! Thank you so much for your cooperation. Should you have any questions please contact our Practice Administrator.     The Physicians and Staff,  [facility] [employer] [facility] [address]  What is a living will?  A living will is a legal form you use to write down the kind of care you want at the end of your life. It is used by the health professionals who will treat you if you aren't able to decide for yourself. If you put your wishes in writing, your loved ones and others will know what kind of care you want. They won't need to guess. This can ease your mind and be helpful to others. A living will is not the same as an estate or property will. An estate will explains what you want to happen with your money and property after you die. Is a living will a legal document? A living will is a legal document. Each state has its own laws about living ariza. If you move to another state, make sure that your living will is legal in the state where you now live. Or you might use a universal form that has been approved by many states. This kind of form can sometimes be completed and stored online. Your electronic copy will then be available wherever you have a connection to the Internet. In most cases, doctors will respect your wishes even if you have a form from a different state. · You don't need an  to complete a living will. But legal advice can be helpful if your state's laws are unclear, your health history is complicated, or your family can't agree on what should be in your living will. · You can change your living will at any time. Some people find that their wishes about end-of-life care change as their health changes. · In addition to making a living will, think about completing a medical power of  form. This form lets you name the person you want to make end-of-life treatment decisions for you (your \"health care agent\") if you're not able to. Many hospitals and nursing homes will give you the forms you need to complete a living will and a medical power of . · Your living will is used only if you can't make or communicate decisions for yourself anymore.  If you become able to make decisions again, you can accept or refuse any treatment, no matter what you wrote in your living will. · Your state may offer an online registry. This is a place where you can store your living will online so the doctors and nurses who need to treat you can find it right away. What should you think about when creating a living will? Talk about your end-of-life wishes with your family members and your doctor. Let them know what you want. That way the people making decisions for you won't be surprised by your choices. Think about these questions as you make your living will:  · Do you know enough about life support methods that might be used? If not, talk to your doctor so you know what might be done if you can't breathe on your own, your heart stops, or you're unable to swallow. · What things would you still want to be able to do after you receive life-support methods? Would you want to be able to walk? To speak? To eat on your own? To live without the help of machines? · If you have a choice, where do you want to be cared for? In your home? At a hospital or nursing home? · Do you want certain Anglican practices performed if you become very ill? · If you have a choice at the end of your life, where would you prefer to die? At home? In a hospital or nursing home? Somewhere else? · Would you prefer to be buried or cremated? · Do you want your organs to be donated after you die? What should you do with your living will? · Make sure that your family members and your health care agent have copies of your living will. · Give your doctor a copy of your living will to keep in your medical record. If you have more than one doctor, make sure that each one has a copy. · You may want to put a copy of your living will where it can be easily found. Where can you learn more? Go to http://leandro-ana luisa.info/. Enter N277 in the search box to learn more about \"Learning About Living Pervladimir. \"  Current as of: February 24, 2016  Content Version: 11.2  © 9061-3937 [employer]. Care instructions adapted under license by [employer] (which disclaims liability or warranty for this information). If you have questions about a medical condition or this instruction, always ask your healthcare professional. [address] any warranty or liability for your use of this information. Patient will continue aspirin and pravastatin and atenolol and follow-up with primary care. Seems to have made a fantastic recovery. Is currently being followed by cardiology. Revisit as needed.

## 2017-04-10 NOTE — PROGRESS NOTES
Neurology Consult      Subjective:      Jaye Marin is a 74 y.o. male who returns since hospitalization for right distal MCA ischemic stroke. Was in [facility] with clinical findings of a left upper extremity weakness picture without sensory loss. Had workup with unrevealing MRA echocardiogram and carotid Dopplers. The one exception was moderate narrowing of the proximal posterior division of the right M2 segment. Is currently followed by cardiology with an event monitor. Is on aspirin and pravastatin and atenolol. Is followed by primary care. Has made a fantastic recovery and looks great in the office today. Do not see any new issues on today's history or exam and suggest follow-up with cardiology and primary care as is the case. Welcome back here as needed. Current Outpatient Prescriptions   Medication Sig Dispense Refill    pravastatin (PRAVACHOL) 20 mg tablet Take 20 mg by mouth nightly.  aspirin 81 mg chewable tablet Take 1 Tab by mouth daily. Indications: Acute Coronary Syndrome      atenolol (TENORMIN) 25 mg tablet Take 0.5 Tabs by mouth daily. 30 Tab 0      Allergies   Allergen Reactions    Lipitor [Atorvastatin] Rash    Macrolide Antibiotics Hives    Niacin Rash    Other Medication Hives     All mycin medication groups/family      Past Medical History:   Diagnosis Date    Hypercholesteremia     Hypertension     Migraines     Obesity (BMI 30-39.9) 3/25/2017    Stroke [facility])       Past Surgical History:   Procedure Laterality Date    HX ORTHOPAEDIC      knees      Social History     Social History    Marital status:      Spouse name: N/A    Number of children: N/A    Years of education: N/A     Occupational History    Not on file.      Social History Main Topics    Smoking status: Former Smoker     Quit date: 1977    Smokeless tobacco: Never Used    Alcohol use 1.2 oz/week     2 Shots of liquor per week    Drug use: No    Sexual activity: Yes Other Topics Concern    Not on file     Social History Narrative      Family History   Problem Relation Age of Onset    Stroke Father     Migraines Sister     Hypertension Neg Hx     Heart Disease Neg Hx       Visit Vitals    /86    Pulse 61    Temp 98 °F (36.7 °C) (Oral)    Resp 18    Ht 6' 1\" (1.854 m)    Wt 109.1 kg (240 lb 9.6 oz)    SpO2 97%    BMI 31.74 kg/m2        Review of Systems:   A comprehensive review of systems was negative except for that written in the HPI. Neuro Exam:     Appearance: The patient is well developed, well nourished, provides a coherent history and is in no acute distress. Mental Status: Oriented to time, place and person. Mood and affect appropriate. Cranial Nerves:   Intact visual fields. Fundi are benign. PHIL, EOM's full, no nystagmus, no ptosis. Facial sensation is normal. Corneal reflexes are intact. Facial movement is symmetric. Hearing is normal bilaterally. Palate is midline with normal sternocleidomastoid and trapezius muscles are normal. Tongue is midline. Motor:  5/5 strength in upper and lower proximal and distal muscles. Normal bulk and tone. No fasciculations. Reflexes:   Deep tendon reflexes 2+/4 and symmetrical.   Sensory:   Normal to touch, pinprick and vibration. Gait:  Normal gait. Tremor:   No tremor noted. Cerebellar:  No cerebellar signs present. Neurovascular:  Normal heart sounds and regular rhythm, peripheral pulses intact, and no carotid bruits. Assessment:   History of right distal MCA ischemic stroke. Nice recovery. Continue risk factor modification through primary care and keep aspirin lovastatin and atenolol in place. Is following up with cardiology and the event monitor. Revisit here as needed. Plan:   Revisit as needed.   Signed by :  Cesia Braxton MD

## 2017-04-17 ENCOUNTER — TELEPHONE (OUTPATIENT)
Dept: CARDIOLOGY CLINIC | Age: 67
End: 2017-04-17

## 2017-04-18 ENCOUNTER — TELEPHONE (OUTPATIENT)
Dept: CARDIOLOGY CLINIC | Age: 67
End: 2017-04-18

## 2017-04-18 NOTE — TELEPHONE ENCOUNTER
Called patient, ID verified using two patient identifiers, informed patient per Dr. Soto Edu he may return his event monitor due to rash and severe itching from electrodes. Patient verbalizes understanding.      Future Appointments  Date Time Provider Edis Solo   5/3/2017 1:00 PM Daylin Angelo MD [address] [place] [address]

## 2017-04-23 ENCOUNTER — HOSPITAL ENCOUNTER (EMERGENCY)
Age: 67
Discharge: HOME | End: 2017-04-23
Attending: EMERGENCY MEDICINE
Payer: MEDICARE

## 2017-04-23 VITALS
DIASTOLIC BLOOD PRESSURE: 84 MMHG | SYSTOLIC BLOOD PRESSURE: 188 MMHG | OXYGEN SATURATION: 94 % | HEIGHT: 72 IN | RESPIRATION RATE: 18 BRPM | WEIGHT: 234 LBS | HEART RATE: 73 BPM | TEMPERATURE: 98.1 F | BODY MASS INDEX: 31.69 KG/M2

## 2017-04-23 DIAGNOSIS — M54.9 UPPER BACK PAIN: Primary | ICD-10-CM

## 2017-04-23 PROCEDURE — 93005 ELECTROCARDIOGRAM TRACING: CPT

## 2017-04-23 PROCEDURE — 99283 EMERGENCY DEPT VISIT LOW MDM: CPT

## 2017-04-23 RX ORDER — DICLOFENAC SODIUM 10 MG/G
GEL TOPICAL 4 TIMES DAILY
Qty: 100 G | Refills: 0 | Status: SHIPPED | OUTPATIENT
Start: 2017-04-23 | End: 2017-05-03

## 2017-04-23 RX ORDER — CYCLOBENZAPRINE HCL 10 MG
10 TABLET ORAL
Qty: 30 TAB | Refills: 0 | Status: SHIPPED | OUTPATIENT
Start: 2017-04-23 | End: 2017-05-03

## 2017-04-23 NOTE — ED PROVIDER NOTES
HPI Comments: 74 y.o. male with past medical history significant for hypercholesteremia, obesity, hypertension, migraines, and stroke who presents from home with chief complaint of back pain. Patient states onset of moderate upper left back pain over the past 2 days that has progressively worsened. Patient reports the pain is exacerbated with certain movements especially when lifting the left arm. Patient admits he does not recall any mechanism of injury during onset. Patient notes the pain is exacerbated upon palpation. Patient has used Advil and heating pads to the area with little relief for his pain. Patient reports taking BP, cholesterol, and aspirin medications. Patient denies any pain with movement of neck. Patient denies shortness of breath, dizziness, numbness, tingling, and headache. There are no other acute medical concerns at this time. Old Chart Review: Per note, patient was admitted here on 03/25/17 for left arm weakness and hypertensive emergency. Social hx: Tobacco Use: No, Alcohol Use: Yes    PCP: Patricia Villavicencio MD    Note written by Shirlene Marvin, as dictated by Patricia Worrell MD 11:47 AM      The history is provided by the patient and the spouse. Past Medical History:   Diagnosis Date    Hypercholesteremia     Hypertension     Migraines     Obesity (BMI 30-39.9) 3/25/2017    Stroke [facility])        Past Surgical History:   Procedure Laterality Date    HX ORTHOPAEDIC      knees         Family History:   Problem Relation Age of Onset    Stroke Father    [facility] Migraines Sister     Hypertension Neg Hx     Heart Disease Neg Hx        Social History     Social History    Marital status:      Spouse name: N/A    Number of children: N/A    Years of education: N/A     Occupational History    Not on file.      Social History Main Topics    Smoking status: Former Smoker     Quit date: 1977    Smokeless tobacco: Never Used    Alcohol use 1.2 oz/week     2 Shots of liquor per week    Drug use: No    Sexual activity: Yes     Other Topics Concern    Not on file     Social History Narrative         ALLERGIES: Lipitor [atorvastatin]; Macrolide antibiotics; Niacin; and Other medication    Review of Systems   Constitutional: Negative. Negative for appetite change, fever and unexpected weight change. HENT: Negative. Negative for ear pain, hearing loss, nosebleeds, rhinorrhea, sore throat and trouble swallowing. Respiratory: Negative. Negative for cough, chest tightness and shortness of breath. Cardiovascular: Negative. Negative for chest pain and palpitations. Gastrointestinal: Negative. Negative for abdominal distention, abdominal pain, blood in stool and vomiting. Endocrine: Negative. Genitourinary: Negative for dysuria and hematuria. Musculoskeletal: Positive for back pain. Negative for myalgias. Skin: Negative. Negative for rash. Allergic/Immunologic: Negative. Neurological: Negative. Negative for dizziness, syncope, weakness and numbness. Hematological: Negative. Psychiatric/Behavioral: Negative. All other systems reviewed and are negative. Vitals:    04/23/17 1125   BP: 188/84   Pulse: 73   Resp: 18   Temp: 98.1 °F (36.7 °C)   SpO2: 94%   Weight: 106.1 kg (234 lb)   Height: 6' (1.829 m)            Physical Exam   Constitutional: He is oriented to person, place, and time. He appears well-developed and well-nourished. No distress. HENT:   Head: Normocephalic and atraumatic. Right Ear: External ear normal.   Left Ear: External ear normal.   Nose: Nose normal.   Mouth/Throat: Oropharynx is clear and moist.   Eyes: Conjunctivae and EOM are normal. Pupils are equal, round, and reactive to light. Neck: Normal range of motion. Neck supple. No JVD present. No thyromegaly present. Cardiovascular: Normal rate, regular rhythm, normal heart sounds and intact distal pulses. No murmur heard.   Pulmonary/Chest: Effort normal and breath sounds normal. No respiratory distress. He has no wheezes. He has no rales. Abdominal: Soft. Bowel sounds are normal. He exhibits no distension. There is no tenderness. Musculoskeletal: Normal range of motion. He exhibits tenderness. He exhibits no edema. Patient has reproducible tenderness of the left rhomboid and trapezius    Neurological: He is alert and oriented to person, place, and time. No cranial nerve deficit. Patient is neurovascularly intact x4   Skin: Skin is warm and dry. No rash noted. Psychiatric: He has a normal mood and affect. His behavior is normal. Thought content normal.      Note written by Shirlene Barrett, as dictated by Winsome Vigil MD 11:46 AM    [facility]  ED Course       Procedures    ED EKG interpretation:  Rhythm: normal sinus rhythm; and regular . Rate (approx.): 65; Axis: normal; ST/T wave: normal; No ectopy  Note written by Shirlene Barrett, as dictated by Winsome Vigil MD 11:33 AM    PROGRESS NOTE:  11:39 AM  Patient has muscular pain. Will discharge the patient with anti-inflammatories, muscle relaxers, and conservative management. Patient agrees to follow up with his doctors within the week.

## 2017-04-23 NOTE — ED TRIAGE NOTES
Pt c/o left scapula pain that radiates down left arm. Pain is worsened and improved by different movements. Denies any chest pain or SOB.

## 2017-04-23 NOTE — DISCHARGE INSTRUCTIONS
Back Pain: Care Instructions  Your Care Instructions    Back pain has many possible causes. It is often related to problems with muscles and ligaments of the back. It may also be related to problems with the nerves, discs, or bones of the back. Moving, lifting, standing, sitting, or sleeping in an awkward way can strain the back. Sometimes you don't notice the injury until later. Arthritis is another common cause of back pain. Although it may hurt a lot, back pain usually improves on its own within several weeks. Most people recover in 12 weeks or less. Using good home treatment and being careful not to stress your back can help you feel better sooner. Follow-up care is a key part of your treatment and safety. Be sure to make and go to all appointments, and call your doctor if you are having problems. Its also a good idea to know your test results and keep a list of the medicines you take. How can you care for yourself at home? · Sit or lie in positions that are most comfortable and reduce your pain. Try one of these positions when you lie down:  ¨ Lie on your back with your knees bent and supported by large pillows. ¨ Lie on the floor with your legs on the seat of a sofa or chair. Durene Ligas on your side with your knees and hips bent and a pillow between your legs. ¨ Lie on your stomach if it does not make pain worse. · Do not sit up in bed, and avoid soft couches and twisted positions. Bed rest can help relieve pain at first, but it delays healing. Avoid bed rest after the first day of back pain. · Change positions every 30 minutes. If you must sit for long periods of time, take breaks from sitting. Get up and walk around, or lie in a comfortable position. · Try using a heating pad on a low or medium setting for 15 to 20 minutes every 2 or 3 hours. Try a warm shower in place of one session with the heating pad. · You can also try an ice pack for 10 to 15 minutes every 2 to 3 hours.  Put a thin cloth between the ice pack and your skin. · Take pain medicines exactly as directed. ¨ If the doctor gave you a prescription medicine for pain, take it as prescribed. ¨ If you are not taking a prescription pain medicine, ask your doctor if you can take an over-the-counter medicine. · Take short walks several times a day. You can start with 5 to 10 minutes, 3 or 4 times a day, and work up to longer walks. Walk on level surfaces and avoid hills and stairs until your back is better. · Return to work and other activities as soon as you can. Continued rest without activity is usually not good for your back. · To prevent future back pain, do exercises to stretch and strengthen your back and stomach. Learn how to use good posture, safe lifting techniques, and proper body mechanics. When should you call for help? Call your doctor now or seek immediate medical care if:  · You have new or worsening numbness in your legs. · You have new or worsening weakness in your legs. (This could make it hard to stand up.)  · You lose control of your bladder or bowels. Watch closely for changes in your health, and be sure to contact your doctor if:  · Your pain gets worse. · You are not getting better after 2 weeks. Where can you learn more? Go to http://leandro-ana luisa.info/. Enter V002 in the search box to learn more about \"Back Pain: Care Instructions. \"  Current as of: May 23, 2016  Content Version: 11.2  © 6675-9985 [employer]. Care instructions adapted under license by [employer] (which disclaims liability or warranty for this information). If you have questions about a medical condition or this instruction, always ask your healthcare professional. [address] any warranty or liability for your use of this information.

## 2017-04-24 LAB
ATRIAL RATE: 65 BPM
CALCULATED P AXIS, ECG09: 65 DEGREES
CALCULATED R AXIS, ECG10: -14 DEGREES
CALCULATED T AXIS, ECG11: 92 DEGREES
DIAGNOSIS, 93000: NORMAL
P-R INTERVAL, ECG05: 188 MS
Q-T INTERVAL, ECG07: 426 MS
QRS DURATION, ECG06: 96 MS
QTC CALCULATION (BEZET), ECG08: 443 MS
VENTRICULAR RATE, ECG03: 65 BPM

## 2017-05-03 ENCOUNTER — OFFICE VISIT (OUTPATIENT)
Dept: CARDIOLOGY CLINIC | Age: 67
End: 2017-05-03

## 2017-05-03 VITALS
HEART RATE: 73 BPM | WEIGHT: 236 LBS | BODY MASS INDEX: 31.97 KG/M2 | OXYGEN SATURATION: 97 % | DIASTOLIC BLOOD PRESSURE: 82 MMHG | HEIGHT: 72 IN | SYSTOLIC BLOOD PRESSURE: 138 MMHG | RESPIRATION RATE: 18 BRPM

## 2017-05-03 DIAGNOSIS — I63.9 CRYPTOGENIC STROKE (HCC): Primary | ICD-10-CM

## 2017-05-03 RX ORDER — NAPROXEN SODIUM 220 MG
220 TABLET ORAL
COMMUNITY
End: 2018-03-05

## 2017-05-03 RX ORDER — LORATADINE 10 MG/1
10 TABLET ORAL
COMMUNITY
End: 2017-07-26

## 2017-05-03 NOTE — PROGRESS NOTES
HISTORY OF PRESENTING ILLNESS      Mandie Greene is a 74 y.o. male with hypercholesteremia, hypertension and recent CVA referred for further cardiac monitoring of arrhythmia as cause of cryptogenic stroke. His initial monitor was unrevealing. He denies cardiac symptoms or residual weakness from stroke. ACTIVE PROBLEM LIST     Patient Active Problem List    Diagnosis Date Noted    Acute CVA (cerebrovascular accident) ([facility] [address] 03/26/2017    High cholesterol 03/25/2017    HTN (hypertension) 03/25/2017    Obesity (BMI 30-39.9) 03/25/2017           PAST MEDICAL HISTORY     Past Medical History:   Diagnosis Date    Hypercholesteremia     Hypertension     Migraines     Obesity (BMI 30-39.9) 3/25/2017    Stroke ([facility] [address]            PAST SURGICAL HISTORY     Past Surgical History:   Procedure Laterality Date    HX ORTHOPAEDIC      knees          ALLERGIES     Allergies   Allergen Reactions    Lipitor [Atorvastatin] Rash    Macrolide Antibiotics Hives    Niacin Rash    Other Medication Hives     All mycin medication groups/family           FAMILY HISTORY     Family History   Problem Relation Age of Onset    Stroke Father    Mya Barth Migraines Sister     Hypertension Neg Hx     Heart Disease Neg Hx     negative for cardiac disease       SOCIAL HISTORY     Social History     Social History    Marital status:      Spouse name: N/A    Number of children: N/A    Years of education: N/A     Social History Main Topics    Smoking status: Former Smoker     Quit date: 1977    Smokeless tobacco: Never Used    Alcohol use 1.2 oz/week     2 Shots of liquor per week    Drug use: No    Sexual activity: Yes     Other Topics Concern    Not on file     Social History Narrative         MEDICATIONS     Current Outpatient Prescriptions   Medication Sig    cyclobenzaprine (FLEXERIL) 10 mg tablet Take 1 Tab by mouth three (3) times daily as needed for Muscle Spasm(s).     diclofenac (VOLTAREN) 1 % gel Apply  to affected area four (4) times daily.  pravastatin (PRAVACHOL) 20 mg tablet Take 20 mg by mouth nightly.  aspirin 81 mg chewable tablet Take 1 Tab by mouth daily. Indications: Acute Coronary Syndrome    atenolol (TENORMIN) 25 mg tablet Take 0.5 Tabs by mouth daily. No current facility-administered medications for this visit. I have reviewed the nurses notes, vitals, problem list, allergy list, medical history, family, social history and medications. REVIEW OF SYMPTOMS      General: Pt denies excessive weight gain or loss. Pt is able to conduct ADL's  HEENT: Denies blurred vision, headaches, hearing loss, epistaxis and difficulty swallowing. Respiratory: Denies cough, congestion, shortness of breath, SOLANO, wheezing or stridor. Cardiovascular: Denies precordial pain, palpitations, edema or PND  Gastrointestinal: Denies poor appetite, indigestion, abdominal pain or blood in stool  Genitourinary: Denies hematuria, dysuria, increased urinary frequency  Musculoskeletal: Denies joint pain or swelling from muscles or joints  Neurologic: Denies tremor, paresthesias, headache, or sensory motor disturbance  Psychiatric: Denies confusion, insomnia, depression  Integumentray: Denies rash, itching or ulcers. Hematologic: Denies easy bruising, bleeding     PHYSICAL EXAMINATION      There were no vitals filed for this visit. General: Well developed, in no acute distress. HEENT: No jaundice, oral mucosa moist, no oral ulcers  Neck: Supple, no stiffness, no lymphadenopathy, supple  Heart:  Normal S1/S2 negative S3 or S4. Regular, no murmur, gallop or rub, no jugular venous distention  Respiratory: Clear bilaterally x 4, no wheezing or rales  Abdomen:   Soft, non-tender, bowel sounds are active.   Extremities:  No edema, normal cap refill, no cyanosis.   Musculoskeletal: No clubbing, no deformities  Neuro: A&Ox3, speech clear, gait stable, cooperative, no focal neurologic deficits  Skin: Skin color is normal. No rashes or lesions. Non diaphoretic, moist.  Vascular: 2+ pulses symmetric in all extremities       DIAGNOSTIC DATA          LABORATORY DATA      Lab Results   Component Value Date/Time    WBC 6.5 03/26/2017 05:30 AM    HGB 16.6 03/26/2017 05:30 AM    HCT 48.0 03/26/2017 05:30 AM    PLATELET 495 77/22/8731 05:30 AM    MCV 90.1 03/26/2017 05:30 AM      Lab Results   Component Value Date/Time    Sodium 141 03/26/2017 05:30 AM    Potassium 4.1 03/26/2017 05:30 AM    Chloride 104 03/26/2017 05:30 AM    CO2 28 03/26/2017 05:30 AM    Anion gap 9 03/26/2017 05:30 AM    Glucose 111 03/26/2017 05:30 AM    BUN 20 03/26/2017 05:30 AM    Creatinine 0.97 03/26/2017 05:30 AM    BUN/Creatinine ratio 21 03/26/2017 05:30 AM    GFR est AA >60 03/26/2017 05:30 AM    GFR est non-AA >60 03/26/2017 05:30 AM    Calcium 8.6 03/26/2017 05:30 AM    Bilirubin, total 0.5 03/25/2017 10:13 AM    AST (SGOT) 18 03/25/2017 10:13 AM    Alk. phosphatase 113 03/25/2017 10:13 AM    Protein, total 8.4 03/25/2017 10:13 AM    Albumin 3.9 03/25/2017 10:13 AM    Globulin 4.5 03/25/2017 10:13 AM    A-G Ratio 0.9 03/25/2017 10:13 AM    ALT (SGPT) 21 03/25/2017 10:13 AM           ASSESSMENT      1. CVA  2. Hypertension  3. Hyperlipidemia        PLAN     Monitor was unrevealing for arrhythmia therefore will recommend ILR injection for long term monitoring of arrhythmia-related causes of cryptogenic stroke. FOLLOW-UP     1 month    Thank you,  Nima Valderrama MD and Dr. Asif Taveras  for involving me in the care of this extraordinarily pleasant male. Please do not hesitate to contact me for further questions/concerns.      CHARY Fields MD  Cardiac Electrophysiology / Cardiology    [facility].  [address] [facility], [facility], [address] [place]  [phone] / [phone] Fax   [phone] / [phone] Fax

## 2017-05-03 NOTE — PROGRESS NOTES
Visit Vitals    /82 (BP 1 Location: Left arm, BP Patient Position: Sitting)    Pulse 73    Resp 18    Ht 6' (1.829 m)    Wt 236 lb (107 kg)    SpO2 97%    BMI 32.01 kg/m2

## 2017-05-26 ENCOUNTER — TELEPHONE (OUTPATIENT)
Dept: CARDIOLOGY CLINIC | Age: 67
End: 2017-05-26

## 2017-05-26 NOTE — TELEPHONE ENCOUNTER
Called patient to notify him of schedule change to Tuesday, June 6th. Arrive at 12:00 noon. Understanding expressed.

## 2017-05-27 NOTE — TELEPHONE ENCOUNTER
Please call Mr. Karey Krishnamurthy at [phone]. He's been wearing his holter monitor for 2 weeks now and has developed a severe rash from the adhesives. He'd like to know if he'd be able to stop wearing the holter now and turn it in due to this rash.      Thank you, Héctor Venegas
Returned call, ID verified using two patient identifiers, patient states he took his monitor off on Saturday, due to rash and severe itching from electrodes. I suggested he call [employer] to get pediatric electrodes. Patient prefers not to continue with monitor. Informed patient that I would relay information to Dr. Cheri Flores and we would contact him with Dr. Cheri Flores recommendations. Patient is agreeable to this plan.
2 seconds or less

## 2017-06-01 RX ORDER — SODIUM CHLORIDE 0.9 % (FLUSH) 0.9 %
5-10 SYRINGE (ML) INJECTION EVERY 8 HOURS
Status: CANCELLED | OUTPATIENT
Start: 2017-06-01

## 2017-06-01 RX ORDER — SODIUM CHLORIDE 0.9 % (FLUSH) 0.9 %
5-10 SYRINGE (ML) INJECTION AS NEEDED
Status: CANCELLED | OUTPATIENT
Start: 2017-06-01

## 2017-06-06 ENCOUNTER — HOSPITAL ENCOUNTER (OUTPATIENT)
Dept: NON INVASIVE DIAGNOSTICS | Age: 67
Discharge: HOME | End: 2017-06-06
Attending: INTERNAL MEDICINE | Admitting: INTERNAL MEDICINE
Payer: MEDICARE

## 2017-06-06 VITALS
BODY MASS INDEX: 31.97 KG/M2 | HEART RATE: 51 BPM | SYSTOLIC BLOOD PRESSURE: 117 MMHG | OXYGEN SATURATION: 93 % | RESPIRATION RATE: 13 BRPM | WEIGHT: 236 LBS | TEMPERATURE: 98.6 F | HEIGHT: 72 IN | DIASTOLIC BLOOD PRESSURE: 99 MMHG

## 2017-06-06 PROCEDURE — 99152 MOD SED SAME PHYS/QHP 5/>YRS: CPT

## 2017-06-06 PROCEDURE — C1764 EVENT RECORDER, CARDIAC: HCPCS

## 2017-06-06 PROCEDURE — 33282 EP STUDY: CPT

## 2017-06-06 PROCEDURE — 74011000250 HC RX REV CODE- 250: Performed by: INTERNAL MEDICINE

## 2017-06-06 PROCEDURE — 77030012935 HC DRSG AQUACEL BMS -B

## 2017-06-06 PROCEDURE — 74011250636 HC RX REV CODE- 250/636: Performed by: INTERNAL MEDICINE

## 2017-06-06 RX ORDER — GENTAMICIN SULFATE 80 MG/100ML
80 INJECTION, SOLUTION INTRAVENOUS ONCE
Status: COMPLETED | OUTPATIENT
Start: 2017-06-06 | End: 2017-06-06

## 2017-06-06 RX ORDER — CEFAZOLIN SODIUM IN 0.9 % NACL 2 G/50 ML
2 INTRAVENOUS SOLUTION, PIGGYBACK (ML) INTRAVENOUS ONCE
Status: COMPLETED | OUTPATIENT
Start: 2017-06-06 | End: 2017-06-06

## 2017-06-06 RX ORDER — ONDANSETRON 2 MG/ML
4 INJECTION INTRAMUSCULAR; INTRAVENOUS
Status: CANCELLED | OUTPATIENT
Start: 2017-06-06

## 2017-06-06 RX ORDER — SODIUM CHLORIDE 0.9 % (FLUSH) 0.9 %
5-10 SYRINGE (ML) INJECTION EVERY 8 HOURS
Status: CANCELLED | OUTPATIENT
Start: 2017-06-06

## 2017-06-06 RX ORDER — HYDROCODONE BITARTRATE AND ACETAMINOPHEN 5; 325 MG/1; MG/1
1 TABLET ORAL
Status: CANCELLED | OUTPATIENT
Start: 2017-06-06

## 2017-06-06 RX ORDER — FENTANYL CITRATE 50 UG/ML
25-200 INJECTION, SOLUTION INTRAMUSCULAR; INTRAVENOUS
Status: DISCONTINUED | OUTPATIENT
Start: 2017-06-06 | End: 2017-06-06 | Stop reason: HOSPADM

## 2017-06-06 RX ORDER — SODIUM CHLORIDE 0.9 % (FLUSH) 0.9 %
5-10 SYRINGE (ML) INJECTION EVERY 8 HOURS
Status: DISCONTINUED | OUTPATIENT
Start: 2017-06-06 | End: 2017-06-06 | Stop reason: HOSPADM

## 2017-06-06 RX ORDER — ACETAMINOPHEN 325 MG/1
650 TABLET ORAL
Status: CANCELLED | OUTPATIENT
Start: 2017-06-06

## 2017-06-06 RX ORDER — CEPHALEXIN 500 MG/1
500 CAPSULE ORAL 3 TIMES DAILY
Qty: 15 CAP | Refills: 0 | Status: SHIPPED | OUTPATIENT
Start: 2017-06-06 | End: 2017-06-11

## 2017-06-06 RX ORDER — SODIUM CHLORIDE 0.9 % (FLUSH) 0.9 %
5-10 SYRINGE (ML) INJECTION AS NEEDED
Status: CANCELLED | OUTPATIENT
Start: 2017-06-06

## 2017-06-06 RX ORDER — LIDOCAINE HYDROCHLORIDE AND EPINEPHRINE 10; 10 MG/ML; UG/ML
1.5 INJECTION, SOLUTION INFILTRATION; PERINEURAL ONCE
Status: COMPLETED | OUTPATIENT
Start: 2017-06-06 | End: 2017-06-06

## 2017-06-06 RX ORDER — SODIUM CHLORIDE 0.9 % (FLUSH) 0.9 %
5-10 SYRINGE (ML) INJECTION AS NEEDED
Status: DISCONTINUED | OUTPATIENT
Start: 2017-06-06 | End: 2017-06-06 | Stop reason: HOSPADM

## 2017-06-06 RX ADMIN — GENTAMICIN SULFATE 80 MG: 80 INJECTION, SOLUTION INTRAVENOUS at 14:05

## 2017-06-06 RX ADMIN — FENTANYL CITRATE 50 MCG: 50 INJECTION, SOLUTION INTRAMUSCULAR; INTRAVENOUS at 13:44

## 2017-06-06 RX ADMIN — FENTANYL CITRATE 25 MCG: 50 INJECTION, SOLUTION INTRAMUSCULAR; INTRAVENOUS at 13:51

## 2017-06-06 RX ADMIN — LIDOCAINE HYDROCHLORIDE,EPINEPHRINE BITARTRATE 100 MG: 10; .01 INJECTION, SOLUTION INFILTRATION; PERINEURAL at 13:51

## 2017-06-06 RX ADMIN — FENTANYL CITRATE 25 MCG: 50 INJECTION, SOLUTION INTRAMUSCULAR; INTRAVENOUS at 13:49

## 2017-06-06 RX ADMIN — CEFAZOLIN 2 G: 1 INJECTION, POWDER, FOR SOLUTION INTRAMUSCULAR; INTRAVENOUS; PARENTERAL at 13:42

## 2017-06-06 NOTE — PROCEDURES
Cardiac Electrophysiology Report      PATIENT INFORMATION     Patient Name: Venice Whyte MRN: [id number]      Study Date: 2017    YOB: 1950  Age: 74 y.o. Gender: male      Procedure:  Loop Recorder Injectiona    Referring Physician:  Carmelo Talley MD and Dr. Jame Thompson     Duty Name   Electrophysiologist Marco Limon MD   Monitor Ricardo Campos RN   Circulator Kaela Sorenson RN       PATIENT HISTORY     Venice Whyte is a 74 y.o. male with hypercholesteremia, hypertension and recent CVA referred for further cardiac monitoring of arrhythmia as cause of cryptogenic stroke. His initial monitor was unrevealing. He denies cardiac symptoms or residual weakness from stroke. He now presents for injection of a loop recorder for enhanced rhythm monitoring for atrial fibrillation/atrial flutter as an underlying etiology for his stroke. PROCEDURE     The patient was brought to the Cardiac Electrophysiology laboratory in a post-absorptive, fasting state. Informed consent was obtained. A peripheral IV was in place. Continuous electrocardiographic, blood pressure, O2 saturation and  CO2 monitoring was initiated. Pre-operative antibiotics were administered pre-operatively. Self-adhesive cardioversion patches were positioned on the chest.  Conscious sedation was effectuated according to protocol. The patient was then prepped and draped in the usual sterile fashion. A 50/50 mixture of lidocaine (1%) with epinephrine and bupivicaine (0.5%) was utilized for local anesthesia. A blunt incision was delivered along the left sternal border between the 3rd and 4th intercostal space. A loop recorder was then injected successfully using a [employer] loop recorder injection tool. Dermabond adhesive glue was applied to the skin. The patient remained hemodynamically stable, tolerated the procedure well and was transferred in stable condition.  There were no immediate complications encountered during the procedure. There was minimal blood loss and no specimen were removed. LEAD & GENERATOR DATA       Model # Serial #   Generator [employer] [id number]       MEDICATION SUMMARY     Medication Route Unit Total   Ancef IV grams 1   Fentanyl IV micrograms 100       RADIOLOGY SUMMARY     N/A      CONCLUSIONS     1. Successful injection of loop recorder. 2. Wound check in EP clinic in 7 days. 3. Oral antibiotics x 5 days. 4.  Follow up in EP clinic in 1 month or earlier if necessary. 5. Follow up with  Santiago Gibbs MD and Dr. Bryan Luciano as scheduled. Thank you Santiago Gibbs MD and Dr. Marisa Colbert allowing me to participate in the care of this extraordinarily pleasant male.         Erik Gonzalez MD  Cardiac Electrophysiology / Cardiology    [facility] 92.  [address] [facility], [address], [address] [place] [address]  [phone] / [phone] Fax [phone] / [phone] Fax

## 2017-06-06 NOTE — IP AVS SNAPSHOT
Isela Lee 
 
 
 [address] [place] [address] [address] [place] [address] 
[phone] Patient: Tory Gil MRN: [id number] YBT:[id number] You are allergic to the following Allergen Reactions Lipitor (Atorvastatin) Rash Macrolide Antibiotics Hives Niacin Rash Other Medication Hives All mycin medication groups/family Recent Documentation Height Weight BMI Smoking Status 1.829 m 107 kg 32.01 kg/m2 Former Smoker Emergency Contacts Name Discharge Info Relation Home Work Mobile Nia Acosta DISCHARGE CAREGIVER [3] Spouse [3] [phone] About your hospitalization You were admitted on:  June 6, 2017 You last received care in the:  [facility] You were discharged on:  June 6, 2017 Unit phone number:  [phone] Why you were hospitalized Your primary diagnosis was:  Not on File Providers Seen During Your Hospitalizations Provider Role Specialty Primary office phone Radha Do MD Attending Provider Cardiology [phone] Your Primary Care Physician (PCP) Primary Care Physician Office Phone Office Fax Arielle, [address] [place] [address] [phone] Follow-up Information Follow up With Details Comments Contact Info Sheridan De La Cruz MD   [address] [facility] 
[phone] Current Discharge Medication List  
  
START taking these medications Dose & Instructions Dispensing Information Comments Morning Noon Evening Bedtime  
 cephALEXin 500 mg capsule Commonly known as:  Mirian Howard Your last dose was: Your next dose is:    
   
   
 Dose:  500 mg Take 1 Cap by mouth three (3) times daily for 5 days. Quantity:  15 Cap Refills:  0 CONTINUE these medications which have NOT CHANGED Dose & Instructions Dispensing Information Comments Morning Noon Evening Bedtime ALEVE 220 mg tablet Generic drug:  naproxen sodium Your last dose was: Your next dose is:    
   
   
 Dose:  220 mg Take 220 mg by mouth two (2) times daily (with meals). Refills:  0  
     
   
   
   
  
 aspirin 81 mg chewable tablet Your last dose was: Your next dose is:    
   
   
 Dose:  81 mg Take 1 Tab by mouth daily. Indications: Acute Coronary Syndrome Refills:  0  
     
   
   
   
  
 atenolol 25 mg tablet Commonly known as:  TENORMIN Your last dose was: Your next dose is:    
   
   
 Dose:  12.5 mg Take 0.5 Tabs by mouth daily. Quantity:  30 Tab Refills:  0  
     
   
   
   
  
 loratadine 10 mg tablet Commonly known as:  Rose Nimesh Your last dose was: Your next dose is:    
   
   
 Dose:  10 mg Take 10 mg by mouth. Refills:  0 PRAVACHOL 20 mg tablet Generic drug:  pravastatin Your last dose was: Your next dose is:    
   
   
 Dose:  20 mg Take 20 mg by mouth nightly. Refills:  0 Where to Get Your Medications Information on where to get these meds will be given to you by the nurse or doctor. ! Ask your nurse or doctor about these medications  
  cephALEXin 500 mg capsule Discharge Instructions Loop Recorder Discharge Instructions Please make sure you have received your Temporary Loop Recorder identification card with your discharge instructions MEDICATIONS ? Take only the medications prescribed to you at discharge. ACTIVITY ? Return to your normal activity, except as noted below. o Avoid tight clothes or unnecessary pressure over your incision (such as bra straps or seat belts).   If it is tender or sensitive to clothing, cover the incision with a soft dressing or pad. 
o Questions about driving are individualized and should be discussed with one of the EP Physicians prior to discharge. SHOWERING  
  
 
? Leave the bandage over your incision for 7 days after the Loop Recorder implant. You bandage will be removed in clinic in 7 days. ? It is important to keep the bandaged area clean and dry. You may shower around the site until the bandage is removed in clinic. Thereafter, you may shower after the bandage is removed, washing it gently with soap and water. Do not apply any lotions, powders, or perfumes to the incision line. ? Avoid submerging your incision in water (tub baths, hot tubs, or swimming) for two weeks. ? Underneath the dressing. o If you have white steri-strips over your incision (underneath the gauze dressing), they will curl up at the end and fall off, usually within 10 days. Do not pull them off. 
- OR -  
o You may have a different type of closure for the incision. If Dermabond Adhesive was used to close your incision, you will receive a separate instruction sheet. DISCHARGE PRECAUTIONS ? Record your temperature every day, at the same time, for 3 weeks after your implant. A temperature of 100.5 F, or higher, can be the first sign of infection. This should be reported to your Doctor immediately. ? You can have an MRI after 6 weeks. You must be aware that any strong magnet or magnetic field can affect your Loop Recorder. In general, be careful of metal detectors, heavy machinery, and any area where arc-welding is performed. Avoid metal detectors such as the ones in security checkpoints at [facility] or [address] [place] [address]. When approaching a security checkpoint show your Loop Recorder ID Card to security personnel and ask to be hand searched. ? Always tell your doctor or dentist that you have a Loop Recorder. Antibiotics may be prescribed before certain procedures. ? Your temporary identification will be given to you with these instructions.   Keep your device card in your wallet or on your person at all times. You should receive your device in 8 weeks. If you do not receive your permanent card, please call the office at [phone]. TAKING YOUR PULSE ? Take your pulse the same time every day, preferably in the morning. ? Sit down and rest for 5 minutes prior to taking your pulse. ? Take your pulse for 1 full minute, use a clock or stop watch with a second hand. ? To feel your pulse, use the first two fingers of one hand; place them on the thumb side of the wrist of the opposite hand. The pulse will be steady, regular and throbbing. ? Call the EP Lab Doctors if your pulse is less than 40 beats per minute. SYMPTOMS THAT NEED TO BE REPORTED IMMEDIATELY ? Temperature more than 100.4 F ? Redness or warmth at the incision site, or pain for longer than the first 5 days after the implant. ? Drainage from the incision site. ? Swelling around the incision site. ? Shortness of breath. ? Rapid heart rate or palpitations. ? Dizziness, lightheadedness, fainting. ? Slow pulse below 40 beats per minute. ? REMEMBER: If you feel something is an emergency or cannot be handled over the phone, call 911 or go to the closest emergency room. Kavitha Levi MD 
Cardiac Electrophysiology / Cardiology [facility] 
[address], [address] [facility], [address] [place] [address] [address]         [address] 
[phone] / [phone] Fax       [phone] / [phone] Fax Discharge Orders None Introducing [facility]! Dear Molly Manriquez: Thank you for requesting a [employer] account. Our records indicate that you already have an active [employer] account. You can access your account anytime at [email or web address] Did you know that you can access your hospital and ER discharge instructions at any time in MyChart? You can also review all of your test results from your hospital stay or ER visit. Additional Information If you have questions, please visit the Frequently Asked Questions section of the [employer] website at [email or web address] Remember, MyChart is NOT to be used for urgent needs. For medical emergencies, dial 911. Now available from your iPhone and Android! General Information Please provide this summary of care documentation to your next provider. Patient Signature:  ____________________________________________________________ Date:  ____________________________________________________________  
  
Sally Burn Provider Signature:  ____________________________________________________________ Date:  ____________________________________________________________

## 2017-06-06 NOTE — PROGRESS NOTES
Pt chart accessed to review all areas including but not limited to pt history, test results, labs, and orders in preparation for possible Angio/Cath/EP procedure. Patient arrived. ID and allergies verified verbally with patient. Pt voices understanding of procedure to be performed. Consent obtained. Pt prepped for procedure. Patients chest wiped and cleaned before procedure. TRANSFER - OUT REPORT:    Verbal report given to  [address]  [facility]  being transferred to  (unit) for routine progression of care       Report consisted of patients Situation, Background, Assessment and   Recommendations(SBAR). Information from the following report(s) SBAR was reviewed with the receiving nurse. Lines:   Peripheral IV 06/06/17 Right Antecubital (Active)   Site Assessment Clean, dry, & intact 6/6/2017 12:33 PM   Phlebitis Assessment 0 6/6/2017 12:33 PM   Infiltration Assessment 0 6/6/2017 12:33 PM   Dressing Status Clean, dry, & intact 6/6/2017 12:33 PM   Dressing Type Transparent 6/6/2017 12:33 PM   Hub Color/Line Status Pink 6/6/2017 12:33 PM   Alcohol Cap Used Yes 6/6/2017 12:33 PM        Opportunity for questions and clarification was provided. Patient transported with:   Registered Nurse        TRANSFER - IN REPORT:    Verbal report received from  [address]  [facility]  being received from   (unit) for routine progression of care      Report consisted of patients Situation, Background, Assessment and   Recommendations(SBAR). Information from the following report(s) SBAR and Procedure Summary was reviewed with the receiving nurse. Opportunity for questions and clarification was provided. Assessment completed upon patients arrival to unit and care assumed. Pt voices understanding of post procedure bedrest instructions. Patient HOB up 30 degrees. Mid Chest  site dressing dry and intact. No bleeding or hematoma noted. Will continue to monitor.     Discharge instructions reviewed with patient and family. Voiced understanding. Patient given copy of discharge instructions to take home. His wife verbalized that she understood instructions. Jessica Floyd is calling in  Keflex prescription/[facility] for th patient. Luis Alfredo Sun MEDICATION AND   SIDE EFFECT GUIDE    The [facility] MEDICATION AND SIDE EFFECT GUIDE was provided to the PATIENT AND CARE PROVIDER. Information provided includes instruction about drug purpose and common side effects for the following medications:   · Keflex      Pt sat on side of bed then ambulated around unit and to restroom. Voided. Site dressing dry and intact. No bleeding or hematoma. Pt voices no complaints. Pt discharged via wheelchair  With his wife assuming care. Personal belongings with patient upon discharge.

## 2017-06-06 NOTE — H&P
HISTORY OF PRESENTING ILLNESS       Bradly Isaac is a 74 y.o. male with hypercholesteremia, hypertension and recent CVA referred for further cardiac monitoring of arrhythmia as cause of cryptogenic stroke. His initial monitor was unrevealing.  He denies cardiac symptoms or residual weakness from stroke.         ACTIVE PROBLEM LIST           Patient Active Problem List     Diagnosis Date Noted    Acute CVA [facility]cerebrovascular accident) ([facility] 03/26/2017    High cholesterol 03/25/2017    HTN (hypertension) 03/25/2017    Obesity (BMI 30-39.9) 03/25/2017            PAST MEDICAL HISTORY           Past Medical History:   Diagnosis Date    Hypercholesteremia      Hypertension      Migraines      Obesity (BMI 30-39.9) 3/25/2017    Stroke ([facility] [address]              PAST SURGICAL HISTORY            Past Surgical History:   Procedure Laterality Date    HX ORTHOPAEDIC         knees            ALLERGIES            Allergies   Allergen Reactions    Lipitor [Atorvastatin] Rash    Macrolide Antibiotics Hives    Niacin Rash    Other Medication Hives       All mycin medication groups/family             FAMILY HISTORY            Family History   Problem Relation Age of Onset    Stroke Father      Migraines Sister      Hypertension Neg Hx      Heart Disease Neg Hx      negative for cardiac disease        SOCIAL HISTORY       Social History                Social History    Marital status:        Spouse name: N/A    Number of children: N/A    Years of education: N/A             Social History Main Topics    Smoking status: Former Smoker       Quit date: 1977    Smokeless tobacco: Never Used    Alcohol use 1.2 oz/week        2 Shots of liquor per week     Drug use: No    Sexual activity: Yes           Other Topics Concern    Not on file      Social History Narrative               MEDICATIONS           Current Outpatient Prescriptions   Medication Sig    cyclobenzaprine (FLEXERIL) 10 mg tablet Take 1 Tab by mouth three (3) times daily as needed for Muscle Spasm(s).  diclofenac (VOLTAREN) 1 % gel Apply to affected area four (4) times daily.  pravastatin (PRAVACHOL) 20 mg tablet Take 20 mg by mouth nightly.  aspirin 81 mg chewable tablet Take 1 Tab by mouth daily. Indications: Acute Coronary Syndrome    atenolol (TENORMIN) 25 mg tablet Take 0.5 Tabs by mouth daily.      No current facility-administered medications for this visit.          I have reviewed the nurses notes, vitals, problem list, allergy list, medical history, family, social history and medications.        REVIEW OF SYMPTOMS       General: Pt denies excessive weight gain or loss. Pt is able to conduct ADL's  HEENT: Denies blurred vision, headaches, hearing loss, epistaxis and difficulty swallowing. Respiratory: Denies cough, congestion, shortness of breath, SOLANO, wheezing or stridor. Cardiovascular: Denies precordial pain, palpitations, edema or PND  Gastrointestinal: Denies poor appetite, indigestion, abdominal pain or blood in stool  Genitourinary: Denies hematuria, dysuria, increased urinary frequency  Musculoskeletal: Denies joint pain or swelling from muscles or joints  Neurologic: Denies tremor, paresthesias, headache, or sensory motor disturbance  Psychiatric: Denies confusion, insomnia, depression  Integumentray: Denies rash, itching or ulcers. Hematologic: Denies easy bruising, bleeding     PHYSICAL EXAMINATION       There were no vitals filed for this visit. General: Well developed, in no acute distress. HEENT: No jaundice, oral mucosa moist, no oral ulcers  Neck: Supple, no stiffness, no lymphadenopathy, supple  Heart:  Normal S1/S2 negative S3 or S4. Regular, no murmur, gallop or rub, no jugular venous distention  Respiratory: Clear bilaterally x 4, no wheezing or rales  Abdomen:   Soft, non-tender, bowel sounds are active.   Extremities: No edema, normal cap refill, no cyanosis.   Musculoskeletal: No clubbing, no deformities  Neuro: A&Ox3, speech clear, gait stable, cooperative, no focal neurologic deficits  Skin: Skin color is normal. No rashes or lesions. Non diaphoretic, moist.  Vascular: 2+ pulses symmetric in all extremities        DIAGNOSTIC DATA            LABORATORY DATA             Lab Results   Component Value Date/Time     WBC 6.5 03/26/2017 05:30 AM     HGB 16.6 03/26/2017 05:30 AM     HCT 48.0 03/26/2017 05:30 AM     PLATELET 194 82/34/5896 05:30 AM     MCV 90.1 03/26/2017 05:30 AM            Lab Results   Component Value Date/Time     Sodium 141 03/26/2017 05:30 AM     Potassium 4.1 03/26/2017 05:30 AM     Chloride 104 03/26/2017 05:30 AM     CO2 28 03/26/2017 05:30 AM     Anion gap 9 03/26/2017 05:30 AM     Glucose 111 03/26/2017 05:30 AM     BUN 20 03/26/2017 05:30 AM     Creatinine 0.97 03/26/2017 05:30 AM     BUN/Creatinine ratio 21 03/26/2017 05:30 AM     GFR est AA >60 03/26/2017 05:30 AM     GFR est non-AA >60 03/26/2017 05:30 AM     Calcium 8.6 03/26/2017 05:30 AM     Bilirubin, total 0.5 03/25/2017 10:13 AM     AST (SGOT) 18 03/25/2017 10:13 AM     Alk. phosphatase 113 03/25/2017 10:13 AM     Protein, total 8.4 03/25/2017 10:13 AM     Albumin 3.9 03/25/2017 10:13 AM     Globulin 4.5 03/25/2017 10:13 AM     A-G Ratio 0.9 03/25/2017 10:13 AM     ALT (SGPT) 21 03/25/2017 10:13 AM            ASSESSMENT       1. CVA  2. Hypertension  3.  Hyperlipidemia         PLAN       ILR injection         Barron Bah MD  Cardiac Electrophysiology / Cardiology     [facility].  [address] [facility], [facility], [address] [place] [address], [address] [place] [address]  [phone] / [phone] Fax   [phone] / [phone] Fax

## 2017-06-06 NOTE — DISCHARGE INSTRUCTIONS
Loop Recorder  Discharge Instructions    Please make sure you have received your Temporary Loop Recorder identification card with your discharge instructions      MEDICATIONS         Take only the medications prescribed to you at discharge. ACTIVITY         Return to your normal activity, except as noted below. o Avoid tight clothes or unnecessary pressure over your incision (such as bra straps or seat belts). If it is tender or sensitive to clothing, cover the incision with a soft dressing or pad.  o Questions about driving are individualized and should be discussed with one of the EP Physicians prior to discharge. SHOWERING         Leave the bandage over your incision for 7 days after the Loop Recorder implant. You bandage will be removed in clinic in 7 days.  It is important to keep the bandaged area clean and dry. You may shower around the site until the bandage is removed in clinic. Thereafter, you may shower after the bandage is removed, washing it gently with soap and water. Do not apply any lotions, powders, or perfumes to the incision line.  Avoid submerging your incision in water (tub baths, hot tubs, or swimming) for two weeks.  Underneath the dressing. o If you have white steri-strips over your incision (underneath the gauze dressing), they will curl up at the end and fall off, usually within 10 days. Do not pull them off.  - OR -   o You may have a different type of closure for the incision. If Dermabond Adhesive was used to close your incision, you will receive a separate instruction sheet. DISCHARGE PRECAUTIONS         Record your temperature every day, at the same time, for 3 weeks after your implant. A temperature of 100.5 F, or higher, can be the first sign of infection. This should be reported to your Doctor immediately.  You can have an MRI after 6 weeks. You must be aware that any strong magnet or magnetic field can affect your Loop Recorder. In general, be careful of metal detectors, heavy machinery, and any area where arc-welding is performed. Avoid metal detectors such as the ones in security checkpoints at [facility] or [address] [place] [address]. When approaching a security checkpoint show your Loop Recorder ID Card to security personnel and ask to be hand searched.  Always tell your doctor or dentist that you have a Loop Recorder. Antibiotics may be prescribed before certain procedures.  Your temporary identification will be given to you with these instructions. Keep your device card in your wallet or on your person at all times. You should receive your device in 8 weeks. If you do not receive your permanent card, please call the office at [phone]. TAKING YOUR PULSE         Take your pulse the same time every day, preferably in the morning.  Sit down and rest for 5 minutes prior to taking your pulse.  Take your pulse for 1 full minute, use a clock or stop watch with a second hand.  To feel your pulse, use the first two fingers of one hand; place them on the thumb side of the wrist of the opposite hand. The pulse will be steady, regular and throbbing.  Call the EP Lab Doctors if your pulse is less than 40 beats per minute. SYMPTOMS THAT NEED TO BE REPORTED IMMEDIATELY         Temperature more than 100.4 F     Redness or warmth at the incision site, or pain for longer than the first 5 days after the implant.  Drainage from the incision site.  Swelling around the incision site.  Shortness of breath.  Rapid heart rate or palpitations.  Dizziness, lightheadedness, fainting.  Slow pulse below 40 beats per minute.  REMEMBER: If you feel something is an emergency or cannot be handled over the phone, call 911 or go to the closest emergency room.           Eliecer Celaya MD  Cardiac Electrophysiology / Cardiology    [facility] [address] [facility], [address], [address] [place] [address],[address]  [address]         [place], [place]  [phone] / [phone] Fax       [phone] / [phone] Fax

## 2017-06-08 ENCOUNTER — PATIENT OUTREACH (OUTPATIENT)
Dept: CARDIOLOGY CLINIC | Age: 67
End: 2017-06-08

## 2017-06-08 ENCOUNTER — OFFICE VISIT (OUTPATIENT)
Dept: CARDIOLOGY CLINIC | Age: 67
End: 2017-06-08

## 2017-06-08 DIAGNOSIS — Z95.818 STATUS POST PLACEMENT OF IMPLANTABLE LOOP RECORDER: Primary | ICD-10-CM

## 2017-06-08 NOTE — PROGRESS NOTES
Transitional Care Nurse Navigator Note:  Hospital Follow Up for hospital visit to : [address] Admission on 6/6 for injection of loop recorder. This represents Transitions of Care because NN spoke with patient and/or caregiver within 2 business days of discharge. Pt's TCM follow up appt is scheduled with Dr. Yaneth Solis on Wednesday 6/14/17 @ 0820. Called and spoke to Mr. Acosta. Mr. Merlyn Noe has no complaints since being discharged. Reviewed discharge instructions with patient and s/s of infection. Patient denies any s/s of infection at this time and is taking his antibiotic as prescribed. Medical History:     Past Medical History:   Diagnosis Date    Hypercholesteremia     Hypertension     Migraines     Obesity (BMI 30-39.9) 3/25/2017    [facility]      Patient presenting symptoms per Dr. Yaneth Solis' note 6/6/17  HISTORY OF PRESENTING ILLNESS       Luís Guerrero is a 74 y.o. male with hypercholesteremia, hypertension and recent CVA referred for further cardiac monitoring of arrhythmia as cause of cryptogenic stroke. His initial monitor was unrevealing. He denies cardiac symptoms or residual weakness from stroke. ASSESSMENT     1. CVA  2. Hypertension  3. Hyperlipidemia   PLAN     ILR injection       Admitted to Cardiology Service. Course of current Hospitalization (referenced by Dr. Yaneth Solis' note 6/6/17):   CONCLUSIONS      1. Successful injection of loop recorder. 2. Wound check in EP clinic in 7 days. 3. Oral antibiotics x 5 days. 4. Follow up in EP clinic in 1 month or earlier if necessary. 5. Follow up with Melyssa Fernandez MD and Dr. Cori aCbello as scheduled. Advance Medical Directive on file in EMR? no     Total Hospitalizations/ED visits last 12 months? 2    Home Health orders at discharge? no     Called patient on 6/8/17 and verified with 2 identifiers.      Medication Reconciliation completed: yes New medications at discharge include [address] [facility] [address] consists of: spouse- Nia    Plan: MD on call at [phone] 24/7.    Follow up appt with cardiology on 6/14/17   Reviewed injection of LR discharge instructions

## 2017-06-12 ENCOUNTER — TELEPHONE (OUTPATIENT)
Dept: CARDIOLOGY CLINIC | Age: 67
End: 2017-06-12

## 2017-06-12 NOTE — TELEPHONE ENCOUNTER
Called patient regarding 3 second pause detected via linq on 6/11. Per patient, he was watching televison during episode and did not notice any irregular heart rhythm, dizziness, or fatigue.   Dr. Behzad Barillas informed

## 2017-06-14 ENCOUNTER — OFFICE VISIT (OUTPATIENT)
Dept: CARDIOLOGY CLINIC | Age: 67
End: 2017-06-14

## 2017-06-14 VITALS
SYSTOLIC BLOOD PRESSURE: 140 MMHG | OXYGEN SATURATION: 94 % | DIASTOLIC BLOOD PRESSURE: 84 MMHG | WEIGHT: 230 LBS | BODY MASS INDEX: 31.15 KG/M2 | HEART RATE: 60 BPM | HEIGHT: 72 IN | RESPIRATION RATE: 16 BRPM

## 2017-06-14 DIAGNOSIS — Z51.89 VISIT FOR WOUND CHECK: ICD-10-CM

## 2017-06-14 DIAGNOSIS — I10 ESSENTIAL HYPERTENSION: Primary | Chronic | ICD-10-CM

## 2017-06-14 RX ORDER — LISINOPRIL 5 MG/1
5 TABLET ORAL DAILY
Qty: 30 TAB | Refills: 6 | Status: SHIPPED | OUTPATIENT
Start: 2017-06-14 | End: 2017-06-30 | Stop reason: SINTOL

## 2017-06-14 NOTE — PROGRESS NOTES
Visit Vitals    /84 (BP 1 Location: Left arm, BP Patient Position: Sitting)    Pulse 60    Resp 16    Ht 6' (1.829 m)    Wt 230 lb (104.3 kg)    SpO2 94%    BMI 31.19 kg/m2

## 2017-06-14 NOTE — PROGRESS NOTES
Patient presents for wound check post-device implantation. The dressing was removed and the site was inspected. The site appeared to be well-healing without ecchymosis/tenderness/erythema. Denies pain, fevers, discharge. Plan:   Device interrogation demonstrated a 3 second pause for which he was asymptomatic. D/w patient and will stop atenolol. Start Lisinopril 5mg daily, continue to monitor blood pressures at home bring to follow up and f/u with PCP. Continue follow up in device clinic as planned.      CHARY Nguyen MD

## 2017-06-19 ENCOUNTER — OFFICE VISIT (OUTPATIENT)
Dept: CARDIOLOGY CLINIC | Age: 67
End: 2017-06-19

## 2017-06-19 ENCOUNTER — DOCUMENTATION ONLY (OUTPATIENT)
Dept: CARDIOLOGY CLINIC | Age: 67
End: 2017-06-19

## 2017-06-19 DIAGNOSIS — Z95.818 STATUS POST PLACEMENT OF IMPLANTABLE LOOP RECORDER: Primary | ICD-10-CM

## 2017-06-19 NOTE — PROGRESS NOTES
During last office visit, Atenolol was discontinued d/t 3 second pause on Linq recorder. Recent ILR download demonstrates a 3 second and 6 second pause during waking hours. Patient reports no symptoms. D/w patient via telephone Dr. Cliff Pettit recommendation of a pacemaker and he is agreeable with plan. Will schedule.      Marlin Fernandez NP

## 2017-06-20 PROBLEM — I45.9 HEART BLOCK: Status: ACTIVE | Noted: 2017-06-20

## 2017-06-20 PROBLEM — I44.2 HEART BLOCK AV THIRD DEGREE (HCC): Status: ACTIVE | Noted: 2017-06-20

## 2017-06-20 RX ORDER — SODIUM CHLORIDE 0.9 % (FLUSH) 0.9 %
5-10 SYRINGE (ML) INJECTION EVERY 8 HOURS
Status: CANCELLED | OUTPATIENT
Start: 2017-06-23

## 2017-06-20 RX ORDER — SODIUM CHLORIDE 0.9 % (FLUSH) 0.9 %
5-10 SYRINGE (ML) INJECTION AS NEEDED
Status: CANCELLED | OUTPATIENT
Start: 2017-06-23

## 2017-06-23 ENCOUNTER — HOSPITAL ENCOUNTER (OUTPATIENT)
Dept: NON INVASIVE DIAGNOSTICS | Age: 67
Setting detail: OBSERVATION
Discharge: HOME | End: 2017-06-24
Attending: INTERNAL MEDICINE | Admitting: INTERNAL MEDICINE
Payer: MEDICARE

## 2017-06-23 ENCOUNTER — APPOINTMENT (OUTPATIENT)
Dept: GENERAL RADIOLOGY | Age: 67
End: 2017-06-23
Attending: INTERNAL MEDICINE
Payer: MEDICARE

## 2017-06-23 LAB
ANION GAP BLD CALC-SCNC: 7 MMOL/L (ref 5–15)
BASOPHILS # BLD AUTO: 0 K/UL (ref 0–0.1)
BASOPHILS # BLD: 0 % (ref 0–1)
BUN SERPL-MCNC: 17 MG/DL (ref 6–20)
BUN/CREAT SERPL: 15 (ref 12–20)
CALCIUM SERPL-MCNC: 9.2 MG/DL (ref 8.5–10.1)
CHLORIDE SERPL-SCNC: 103 MMOL/L (ref 97–108)
CO2 SERPL-SCNC: 31 MMOL/L (ref 21–32)
CREAT SERPL-MCNC: 1.14 MG/DL (ref 0.7–1.3)
EOSINOPHIL # BLD: 0.2 K/UL (ref 0–0.4)
EOSINOPHIL NFR BLD: 2 % (ref 0–7)
ERYTHROCYTE [DISTWIDTH] IN BLOOD BY AUTOMATED COUNT: 13.4 % (ref 11.5–14.5)
GLUCOSE SERPL-MCNC: 93 MG/DL (ref 65–100)
HCT VFR BLD AUTO: 51.8 % (ref 36.6–50.3)
HGB BLD-MCNC: 17.7 G/DL (ref 12.1–17)
LYMPHOCYTES # BLD AUTO: 18 % (ref 12–49)
LYMPHOCYTES # BLD: 1.4 K/UL (ref 0.8–3.5)
MCH RBC QN AUTO: 31.4 PG (ref 26–34)
MCHC RBC AUTO-ENTMCNC: 34.2 G/DL (ref 30–36.5)
MCV RBC AUTO: 91.8 FL (ref 80–99)
MONOCYTES # BLD: 0.7 K/UL (ref 0–1)
MONOCYTES NFR BLD AUTO: 9 % (ref 5–13)
NEUTS SEG # BLD: 5.5 K/UL (ref 1.8–8)
NEUTS SEG NFR BLD AUTO: 71 % (ref 32–75)
PLATELET # BLD AUTO: 211 K/UL (ref 150–400)
POTASSIUM SERPL-SCNC: 4.3 MMOL/L (ref 3.5–5.1)
RBC # BLD AUTO: 5.64 M/UL (ref 4.1–5.7)
SODIUM SERPL-SCNC: 141 MMOL/L (ref 136–145)
WBC # BLD AUTO: 7.8 K/UL (ref 4.1–11.1)

## 2017-06-23 PROCEDURE — 99218 HC RM OBSERVATION: CPT

## 2017-06-23 PROCEDURE — 74011000250 HC RX REV CODE- 250: Performed by: INTERNAL MEDICINE

## 2017-06-23 PROCEDURE — 77030011640 HC PAD GRND REM COVD -A

## 2017-06-23 PROCEDURE — 33208 INSRT HEART PM ATRIAL & VENT: CPT

## 2017-06-23 PROCEDURE — 99152 MOD SED SAME PHYS/QHP 5/>YRS: CPT | Performed by: INTERNAL MEDICINE

## 2017-06-23 PROCEDURE — C1898 LEAD, PMKR, OTHER THAN TRANS: HCPCS | Performed by: INTERNAL MEDICINE

## 2017-06-23 PROCEDURE — 77030008459 HC STPLR SKN COOP -B

## 2017-06-23 PROCEDURE — C1892 INTRO/SHEATH,FIXED,PEEL-AWAY: HCPCS

## 2017-06-23 PROCEDURE — 77030018673

## 2017-06-23 PROCEDURE — 71020 XR CHEST PA LAT: CPT

## 2017-06-23 PROCEDURE — 77030031139 HC SUT VCRL2 J&J -A

## 2017-06-23 PROCEDURE — 77030018729 HC ELECTRD DEFIB PAD CARD -B

## 2017-06-23 PROCEDURE — 80048 BASIC METABOLIC PNL TOTAL CA: CPT | Performed by: INTERNAL MEDICINE

## 2017-06-23 PROCEDURE — 77030018547 HC SUT ETHBND1 J&J -B

## 2017-06-23 PROCEDURE — 77030012935 HC DRSG AQUACEL BMS -B

## 2017-06-23 PROCEDURE — 36415 COLL VENOUS BLD VENIPUNCTURE: CPT | Performed by: INTERNAL MEDICINE

## 2017-06-23 PROCEDURE — C1785 PMKR, DUAL, RATE-RESP: HCPCS | Performed by: INTERNAL MEDICINE

## 2017-06-23 PROCEDURE — 74011000272 HC RX REV CODE- 272: Performed by: INTERNAL MEDICINE

## 2017-06-23 PROCEDURE — A4565 SLINGS: HCPCS

## 2017-06-23 PROCEDURE — 74011250636 HC RX REV CODE- 250/636: Performed by: INTERNAL MEDICINE

## 2017-06-23 PROCEDURE — 74011636320 HC RX REV CODE- 636/320: Performed by: INTERNAL MEDICINE

## 2017-06-23 PROCEDURE — 77030002933 HC SUT MCRYL J&J -A

## 2017-06-23 PROCEDURE — 85025 COMPLETE CBC W/AUTO DIFF WBC: CPT | Performed by: INTERNAL MEDICINE

## 2017-06-23 PROCEDURE — 99153 MOD SED SAME PHYS/QHP EA: CPT | Performed by: INTERNAL MEDICINE

## 2017-06-23 RX ORDER — CEFAZOLIN SODIUM IN 0.9 % NACL 2 G/50 ML
2 INTRAVENOUS SOLUTION, PIGGYBACK (ML) INTRAVENOUS EVERY 8 HOURS
Status: DISCONTINUED | OUTPATIENT
Start: 2017-06-23 | End: 2017-06-23

## 2017-06-23 RX ORDER — PRAVASTATIN SODIUM 20 MG/1
20 TABLET ORAL
Status: DISCONTINUED | OUTPATIENT
Start: 2017-06-23 | End: 2017-06-24 | Stop reason: HOSPADM

## 2017-06-23 RX ORDER — CEFAZOLIN SODIUM IN 0.9 % NACL 2 G/50 ML
2 INTRAVENOUS SOLUTION, PIGGYBACK (ML) INTRAVENOUS EVERY 8 HOURS
Status: COMPLETED | OUTPATIENT
Start: 2017-06-23 | End: 2017-06-24

## 2017-06-23 RX ORDER — HYDROCODONE BITARTRATE AND ACETAMINOPHEN 5; 325 MG/1; MG/1
1 TABLET ORAL
Status: DISCONTINUED | OUTPATIENT
Start: 2017-06-23 | End: 2017-06-24 | Stop reason: HOSPADM

## 2017-06-23 RX ORDER — CEFAZOLIN SODIUM IN 0.9 % NACL 2 G/50 ML
2 INTRAVENOUS SOLUTION, PIGGYBACK (ML) INTRAVENOUS ONCE
Status: COMPLETED | OUTPATIENT
Start: 2017-06-23 | End: 2017-06-23

## 2017-06-23 RX ORDER — GENTAMICIN SULFATE 80 MG/100ML
80 INJECTION, SOLUTION INTRAVENOUS ONCE
Status: DISCONTINUED | OUTPATIENT
Start: 2017-06-23 | End: 2017-06-23

## 2017-06-23 RX ORDER — GUAIFENESIN 100 MG/5ML
81 LIQUID (ML) ORAL DAILY
Status: DISCONTINUED | OUTPATIENT
Start: 2017-06-24 | End: 2017-06-24 | Stop reason: HOSPADM

## 2017-06-23 RX ORDER — CEPHALEXIN 500 MG/1
500 CAPSULE ORAL 3 TIMES DAILY
Qty: 15 CAP | Refills: 0 | Status: SHIPPED | OUTPATIENT
Start: 2017-06-23 | End: 2017-06-28

## 2017-06-23 RX ORDER — LIDOCAINE HYDROCHLORIDE AND EPINEPHRINE 10; 10 MG/ML; UG/ML
1.5 INJECTION, SOLUTION INFILTRATION; PERINEURAL ONCE
Status: COMPLETED | OUTPATIENT
Start: 2017-06-23 | End: 2017-06-23

## 2017-06-23 RX ORDER — HEPARIN SODIUM 200 [USP'U]/100ML
500 INJECTION, SOLUTION INTRAVENOUS ONCE
Status: COMPLETED | OUTPATIENT
Start: 2017-06-23 | End: 2017-06-23

## 2017-06-23 RX ORDER — FENTANYL CITRATE 50 UG/ML
25-200 INJECTION, SOLUTION INTRAMUSCULAR; INTRAVENOUS
Status: DISCONTINUED | OUTPATIENT
Start: 2017-06-23 | End: 2017-06-23 | Stop reason: HOSPADM

## 2017-06-23 RX ORDER — MIDAZOLAM HYDROCHLORIDE 1 MG/ML
.5-1 INJECTION, SOLUTION INTRAMUSCULAR; INTRAVENOUS
Status: DISCONTINUED | OUTPATIENT
Start: 2017-06-23 | End: 2017-06-23 | Stop reason: HOSPADM

## 2017-06-23 RX ORDER — SODIUM CHLORIDE 0.9 % (FLUSH) 0.9 %
5-10 SYRINGE (ML) INJECTION AS NEEDED
Status: DISCONTINUED | OUTPATIENT
Start: 2017-06-23 | End: 2017-06-23 | Stop reason: SDUPTHER

## 2017-06-23 RX ORDER — LISINOPRIL 5 MG/1
5 TABLET ORAL DAILY
Status: DISCONTINUED | OUTPATIENT
Start: 2017-06-24 | End: 2017-06-24 | Stop reason: HOSPADM

## 2017-06-23 RX ORDER — SODIUM CHLORIDE 0.9 % (FLUSH) 0.9 %
5-10 SYRINGE (ML) INJECTION AS NEEDED
Status: DISCONTINUED | OUTPATIENT
Start: 2017-06-23 | End: 2017-06-24 | Stop reason: HOSPADM

## 2017-06-23 RX ORDER — SODIUM CHLORIDE 0.9 % (FLUSH) 0.9 %
5-10 SYRINGE (ML) INJECTION EVERY 8 HOURS
Status: DISCONTINUED | OUTPATIENT
Start: 2017-06-23 | End: 2017-06-23 | Stop reason: SDUPTHER

## 2017-06-23 RX ORDER — SODIUM CHLORIDE 0.9 % (FLUSH) 0.9 %
5-10 SYRINGE (ML) INJECTION EVERY 8 HOURS
Status: DISCONTINUED | OUTPATIENT
Start: 2017-06-23 | End: 2017-06-24 | Stop reason: HOSPADM

## 2017-06-23 RX ORDER — BUPIVACAINE HYDROCHLORIDE 5 MG/ML
1-20 INJECTION, SOLUTION EPIDURAL; INTRACAUDAL ONCE
Status: COMPLETED | OUTPATIENT
Start: 2017-06-23 | End: 2017-06-23

## 2017-06-23 RX ORDER — ACETAMINOPHEN 325 MG/1
650 TABLET ORAL
Status: DISCONTINUED | OUTPATIENT
Start: 2017-06-23 | End: 2017-06-24 | Stop reason: HOSPADM

## 2017-06-23 RX ADMIN — MIDAZOLAM HYDROCHLORIDE 2 MG: 1 INJECTION, SOLUTION INTRAMUSCULAR; INTRAVENOUS at 16:59

## 2017-06-23 RX ADMIN — BUPIVACAINE HYDROCHLORIDE 100 MG: 5 INJECTION, SOLUTION EPIDURAL; INTRACAUDAL at 17:07

## 2017-06-23 RX ADMIN — FENTANYL CITRATE 25 MCG: 50 INJECTION, SOLUTION INTRAMUSCULAR; INTRAVENOUS at 17:26

## 2017-06-23 RX ADMIN — FENTANYL CITRATE 50 MCG: 50 INJECTION, SOLUTION INTRAMUSCULAR; INTRAVENOUS at 17:00

## 2017-06-23 RX ADMIN — Medication 10 ML: at 21:32

## 2017-06-23 RX ADMIN — CEFAZOLIN 2 G: 1 INJECTION, POWDER, FOR SOLUTION INTRAMUSCULAR; INTRAVENOUS; PARENTERAL at 17:00

## 2017-06-23 RX ADMIN — BACITRACIN: 50000 INJECTION, POWDER, FOR SOLUTION INTRAMUSCULAR at 17:07

## 2017-06-23 RX ADMIN — HEPARIN SODIUM 1000 UNITS: 200 INJECTION, SOLUTION INTRAVENOUS at 17:04

## 2017-06-23 RX ADMIN — FENTANYL CITRATE 25 MCG: 50 INJECTION, SOLUTION INTRAMUSCULAR; INTRAVENOUS at 17:14

## 2017-06-23 RX ADMIN — MIDAZOLAM HYDROCHLORIDE 1 MG: 1 INJECTION, SOLUTION INTRAMUSCULAR; INTRAVENOUS at 17:07

## 2017-06-23 RX ADMIN — LIDOCAINE HYDROCHLORIDE,EPINEPHRINE BITARTRATE 15 MG: 10; .01 INJECTION, SOLUTION INFILTRATION; PERINEURAL at 17:07

## 2017-06-23 RX ADMIN — MIDAZOLAM HYDROCHLORIDE 1 MG: 1 INJECTION, SOLUTION INTRAMUSCULAR; INTRAVENOUS at 17:14

## 2017-06-23 RX ADMIN — IOPAMIDOL 10 ML: 755 INJECTION, SOLUTION INTRAVENOUS at 17:14

## 2017-06-23 RX ADMIN — CEFAZOLIN 2 G: 1 INJECTION, POWDER, FOR SOLUTION INTRAMUSCULAR; INTRAVENOUS; PARENTERAL at 23:45

## 2017-06-23 RX ADMIN — MIDAZOLAM HYDROCHLORIDE 1 MG: 1 INJECTION, SOLUTION INTRAMUSCULAR; INTRAVENOUS at 17:15

## 2017-06-23 NOTE — PROGRESS NOTES
[id number] TRANSFER - IN REPORT:    Verbal report received from Kyle(name) on Lev Acosta  being received from (unit) for routine progression of care      Report consisted of patients Situation, Background, Assessment and   Recommendations(SBAR). Information from the following report(s) Procedure Summary was reviewed with the receiving nurse. Opportunity for questions and clarification was provided. Assessment completed upon patients arrival to unit and care assumed. 6:13 PM  Family at bedside. 6:50 PM  TRANSFER - OUT REPORT:    Verbal report given to Suzie(name) on Jelena Condon  being transferred to [facility](unit) for routine progression of care       Report consisted of patients Situation, Background, Assessment and   Recommendations(SBAR). Information from the following report(s) SBAR, Procedure Summary, Intake/Output, MAR and Cardiac Rhythm SR was reviewed with the receiving nurse. Lines:   Peripheral IV 06/23/17 Left Antecubital (Active)   Site Assessment Clean, dry, & intact 6/23/2017  1:13 PM        Opportunity for questions and clarification was provided.       Patient transported with:   Registered Nurse

## 2017-06-23 NOTE — PROGRESS NOTES
TRANSFER - OUT REPORT:    Verbal report given to Ed RN on Venice Whyte being transferred to   for routine progression of care       Report consisted of patients Situation, Background, Assessment and   Recommendations(SBAR). Information from the following report(s) SBAR was reviewed with the receiving nurse. Opportunity for questions and clarification was provided.    out

## 2017-06-23 NOTE — CARDIO/PULMONARY
Cardiac Rehab: Received consult for post-PPM teaching. 73 yo male admitted for loop recorder removal & PPM implantation, by Dr Paulette Webb. PMH significant for recent CVA. LVEF 55-60%. Patient in EP lab. Will follow. UPDATE 6/24/2017 : S/P dual chamber \"MRI safe\" PPM (6/23). Met with Jelena Condon prior to discharge from 5th floor med-surg unit. Pt reported he resides with his wife in [place], and is a retired  with an [employer], [employer]. Also served in [employer]. Reported he is a former smoker (quit 1973); started at age 13 and smoked up to 2.5 ppd. Discussed pt's understanding of purpose of PPM. Pt reported his heart was pausing for up to 6 seconds. He denied having been given a PPM booklet. Pt called wife and confirmed that she has his pacemaker booklet/card. Reviewed post-PPM instructions, with emphasis on temporary restrictions, monitoring for s/s infection, pain mgmt, and importance of f/u with MD. Pt reported he has a home BP cuff & will use that to check pulse. Discussed purpose & potentials side effects of new PO med  (cephalexin), and current cardiac meds (lisinopril, pravastatin & aspirin). Pt denied any difficulties obtaining medicines. Discussed recent Hgb A1c 6.5, during hospitalization with CVA (3/26/17). Pt indicated he was not familiar with A1c. Explained elevated A1c and provided handout. Pt reported he has lost \"about 25 lbs\" since March. Reviewed heart healthy/low sodium diet. Encouraged pt to discuss A1c further with his PCP. Pt reported he walks daily for about 40 minutes. Provided booklets on Eating for a Healthier Heart and Eating Less Sodium for Life. Tong Acosta verbalized understanding of info provided. Pt aware he has f/u appt with Dr Paulette Webb on 6/30/17. All questions were answered.

## 2017-06-23 NOTE — IP AVS SNAPSHOT
[address] [facility] 
 
 
 [address] [place] [address] 
[phone] Patient: Bradly Isaac MRN: [id number] CM You are allergic to the following Allergen Reactions Erythromycin Hives Pt states allergic to all mycins Lipitor (Atorvastatin) Rash Macrolide Antibiotics Hives Niacin Rash Other Medication Hives All mycin medication groups/family Recent Documentation Height Weight BMI Smoking Status 1.829 m 102.5 kg 30.65 kg/m2 Former Smoker Emergency Contacts Name Discharge Info Relation Home Work Mobile Nia Acosta DISCHARGE CAREGIVER [3] Spouse [3] [phone] About your hospitalization You were admitted on:  2017 You last received care in the:  [facility]  MED SURG 2 You were discharged on:  2017 Unit phone number:  [phone] Why you were hospitalized Your primary diagnosis was:  Not on File Providers Seen During Your Hospitalizations Provider Role Specialty Primary office phone Marisol Amaya MD Attending Provider Cardiology [phone] Your Primary Care Physician (PCP) Primary Care Physician Office Phone Office Rinkux Brendabrian, [address] [place] [address] [phone] Follow-up Information Follow up With Details Comments Contact Info Marisol Amaya MD On 2017 9:40 am  [address] [facility] [address] [place] [address] 
[phone] Mary Schaefer MD   [address] [address] 
[phone] Your Appointments 2017  9:40 AM EDT HOSPITAL DISCHARGE with Marisol Amaya MD  
[facility] ([facility]) [address] [facility] 600 8133 [facility]  
[phone]  2017  9:40 AM EDT  
ESTABLISHED PATIENT with Marisol Amaya MD  
 [facility] ([facility]) [address] [facility] 022 1007 [facility]  
[phone] Current Discharge Medication List  
  
START taking these medications Dose & Instructions Dispensing Information Comments Morning Noon Evening Bedtime  
 cephALEXin 500 mg capsule Commonly known as:  Jessie Dinning Your last dose was: Your next dose is:    
   
   
 Dose:  500 mg Take 1 Cap by mouth three (3) times daily for 5 days. Quantity:  15 Cap Refills:  0 CONTINUE these medications which have NOT CHANGED Dose & Instructions Dispensing Information Comments Morning Noon Evening Bedtime ALEVE 220 mg tablet Generic drug:  naproxen sodium Your last dose was: Your next dose is:    
   
   
 Dose:  220 mg Take 220 mg by mouth daily as needed. Refills:  0  
     
   
   
   
  
 aspirin 81 mg chewable tablet Your last dose was: Your next dose is:    
   
   
 Dose:  81 mg Take 1 Tab by mouth daily. Indications: Acute Coronary Syndrome Refills:  0  
     
   
   
   
  
 lisinopril 5 mg tablet Commonly known as:  Natalie Adam Your last dose was: Your next dose is:    
   
   
 Dose:  5 mg Take 1 Tab by mouth daily. Quantity:  30 Tab Refills:  6  
     
   
   
   
  
 loratadine 10 mg tablet Commonly known as:  Mac Ramonita Your last dose was: Your next dose is:    
   
   
 Dose:  10 mg Take 10 mg by mouth. Refills:  0 PRAVACHOL 20 mg tablet Generic drug:  pravastatin Your last dose was: Your next dose is:    
   
   
 Dose:  20 mg Take 20 mg by mouth nightly. Refills:  0 Where to Get Your Medications These medications were sent to [address] [place] [address] - [address]  [address], [address] [place] [address] Phone:  [phone]  
  cephALEXin 500 mg capsule Discharge Instructions Pacemaker Discharge Instructions Please make sure you have received your Temporary Pacemaker identification card with your discharge instructions MEDICATIONS ? Take only the medications prescribed to you at discharge. ACTIVITY ? Return to your normal activity, except as noted below. o Do not lift anything heavier than 10 pounds for 4 weeks with the affected arm. This is how long it takes the muscles to heal, and the leads inside your heart to stabilize their position. o Do not reach above your head with the affected arm for 4 weeks, doing so increases the risk of lead dislodgement. o It is, however, important to move the affected arm to prevent shoulder stiffness and locking. o Avoid tight clothes or unnecessary pressure over your incision (such as bra straps or seat belts). If it is tender or sensitive to clothing, cover the incision with a soft dressing or pad. 
o Questions about driving are individualized and should be discussed with one of the EP Physicians prior to discharge. SHOWERING  
  
 
? Leave the bandage over your incision for 7 days after the Pacemaker implant. You bandage will be removed in clinic in 7 days. ? It is important to keep the bandaged area clean and dry. You may shower around the site until the bandage is removed in clinic. Thereafter, you may shower after the bandage is removed, washing it gently with soap and water. Do not apply any lotions, powders, or perfumes to the incision line. ? Avoid submerging your incision in water (tub baths, hot tubs, or swimming) for four weeks. ? Underneath the dressing. o If you have white steri-strips over your incision (underneath the gauze dressing), they will curl up at the end and fall off, usually within 10 days.   Do not pull them off. 
- OR -  
 o You may have a different type of closure for the incision. If Dermabond Adhesive was used to close your incision, you will receive a separate instruction sheet. DISCHARGE PRECAUTIONS ? Record your temperature every day, at the same time, for 3 weeks after your implant. A temperature of 100.5 F, or higher, can be the first sign of infection. This should be reported to your Doctor immediately. ? You can have an MRI after 6 weeks. You must be aware that any strong magnet or magnetic field can affect your Pacemaker. In general, be careful of metal detectors, heavy machinery, and any area where arc-welding is performed. Avoid metal detectors such as the ones in security checkpoints at [facility] or [address] [place] [address]. When approaching a security checkpoint show your Pacemaker ID Card to security personnel and ask to be hand searched. ? Always tell your doctor or dentist that you have a Pacemaker. Antibiotics may be prescribed before certain procedures. ? If you use a cell phone, hold it on the opposite side from where your Pacemaker is implanted. ? Your temporary identification will be given to you with these instructions. Keep your Pacemaker card in your wallet or on your person at all times. You should receive your permanent card in 8 weeks. If you do not receive your permanent card, please call the office at [phone]. TAKING YOUR PULSE ? Take your pulse the same time every day, preferably in the morning. ? Sit down and rest for 5 minutes prior to taking your pulse. ? Take your pulse for 1 full minute, use a clock or stop watch with a second hand. ? To feel your pulse, use the first two fingers of one hand; place them on the thumb side of the wrist of the opposite hand. The pulse will be steady, regular and throbbing. ? Call the EP Lab Doctors if your pulse is less than 40 beats per minute. SYMPTOMS THAT NEED TO BE REPORTED IMMEDIATELY ? Temperature more than 100.4 F ? Redness or warmth at the incision site, or pain for longer than the first 5 days after the implant. ? Drainage from the incision site. ? Swelling around the incision site. ? Shortness of breath. ? Rapid heart rate or palpitations. ? Dizziness, lightheadedness, fainting. ? Slow pulse below 40 beats per minute. ? REMEMBER: If you feel something is an emergency or cannot be handled over the phone, call 911 or go to the closest emergency room. Eliecer Celaya MD 
Cardiac Electrophysiology / Cardiology [address] 
[address], [address] [facility], [address] Joaquin Sims [address]         [facility], [facility] 
[phone] / [phone] Fax       [phone] / [phone] Fax Pacemaker Placement: What to Expect at [facility] Your Recovery Pacemaker placement is surgery to put a pacemaker in your chest. A pacemaker is a small, battery-powered device that sends electrical signals to the heart to keep the heartbeat steady. Thin wires, called leads, carry the signals from the pacemaker to the heart. A pacemaker can prevent or reduce dizziness, fainting, and shortness of breath caused by a slow or unsteady heartbeat. Your chest may be sore where the doctor made the cut (incision) and put in the pacemaker. You also may have a bruise and mild swelling. These symptoms usually get better in 1 to 2 weeks. You may feel a hard ridge along the incision. This usually gets softer in the months after surgery. You may be able to see or feel the outline of the pacemaker under your skin. You will probably be able to go back to work or your usual routine 1 to 2 weeks after surgery. Pacemaker batteries usually last 5 to 15 years. Your doctor will talk to you about how often you will need to have your pacemaker checked. When you have a pacemaker, it is important to avoid electrical devices that can stop your pacemaker from working right. Check with your doctor about what you need to stay away from, what you need to use with care, and what is okay to use. You will need to stay away from things with strong magnetic and electrical fields such as an MRI machine (unless your pacemaker is safe for an MRI), welding equipment, and power generators. You can use a cell phone, but keep it at least 6 inches away from your pacemaker. You can safely use most household and office electronics such as kitchen appliances, electric power tools, and computers. This care sheet gives you a general idea about how long it will take for you to recover. But each person recovers at a different pace. Follow the steps below to get better as quickly as possible. How can you care for yourself at home? Activity · Rest when you feel tired. Getting enough sleep will help you recover. · Try to walk each day. Start by walking a little more than you did the day before. Bit by bit, increase the amount you walk. Walking boosts blood flow and helps prevent pneumonia and constipation. · For first 4 weeks: ¨ Avoid activities that strain your chest or upper arm muscles. This includes pushing a  or vacuum, mopping floors, swimming, or swinging a golf club or tennis racquet. ¨ Do not raise your arm, on the side of your body where the pacemaker is located, above your shoulder. ¨ Avoid strenuous activities, such as bicycle riding, jogging, weight lifting, or heavy aerobic exercise. ¨ Avoid lifting anything that would make you strain. This may include heavy grocery bags and milk containers, a heavy briefcase or backpack, cat litter or dog food bags, or a child. · Ask your doctor when you can drive again. · You will probably need to take about 1 to 2 weeks off from work. It depends on the type of work you do and how you feel. · Ask your doctor when it is okay for you to have sex. Diet · You can eat your normal diet. If your stomach is upset, try bland, low-fat foods like plain rice, broiled chicken, toast, and yogurt. · Drink plenty of fluids (unless your doctor tells you not to). Medicines · Your doctor will tell you if and when you can restart your medicines. He or she will also give you instructions about taking any new medicines. · If you take blood thinners, such as warfarin (Coumadin), clopidogrel (Plavix), Xarelto, or aspirin, be sure to talk to your doctor. He or she will tell you if and when to start taking those medicines again. Make sure that you understand exactly what your doctor wants you to do. · Be safe with medicines. Take pain medicines exactly as directed. ¨ If the doctor gave you a prescription medicine for pain, take it as prescribed. ¨ If you are not taking a prescription pain medicine, ask your doctor if you can take an over-the-counter medicine. ¨ Do not take aspirin, ibuprofen (Advil, Motrin), naproxen (Aleve), or other nonsteroidal anti-inflammatory drugs (NSAIDs) unless your doctor says it is okay. · If you think your pain medicine is making you sick to your stomach: 
¨ Take your medicine after meals (unless your doctor has told you not to). ¨ Ask your doctor for a different pain medicine. · If your doctor prescribed antibiotics, take them as directed. Do not stop taking them just because you feel better. You need to take the full course of antibiotics. Incision care · With dressing on, you may take a shower; however, keep shower spray to your back. Do not soak the dressing site. · Your dressing will be removed at your first check up at cardiologist's office. · Keep the incision dry while it heals. Don't use hydrogen peroxide or alcohol on the incision, which can slow healing. · Do not take a bath or get into a hot tub until your doctor tells you it is okay. Other instructions · Keep a medical ID card with you at all times that says you have a pacemaker. The card should include the  and model information. · Wear medical alert jewelry that states you have a pacemaker. You can buy this at most drugstores. · Check your pulse as directed by your doctor. · Have your pacemaker checked as often as your doctor recommends. In some cases, this may be done over the phone or the Internet. Your doctor will give you instructions about how to do this. Follow-up care is a key part of your treatment and safety. Be sure to make and go to all appointments, and call your doctor if you are having problems. It's also a good idea to know your test results and keep a list of the medicines you take. When should you call for help? Call 911 anytime you think you may need emergency care. For example, call if: 
· You passed out (lost consciousness). · You have severe trouble breathing. · You have sudden chest pain and shortness of breath, or you cough up blood. · You have symptoms of a heart attack. These may include: ¨ Chest pain or pressure, or a strange feeling in the chest.. ¨ Sweating. ¨ Shortness of breath. ¨ Nausea or vomiting. ¨ Pain, pressure, or a strange feeling in the back, neck, jaw, or upper belly or in one or both shoulders or arms. ¨ Lightheadedness or sudden weakness. ¨ A fast or irregular heartbeat. After you call 911, the  may tell you to chew 1 adult-strength or 2 to 4 low-dose aspirin. Wait for an ambulance. Do not try to drive yourself. · You have symptoms of a stroke. These may include: 
¨ Sudden numbness, tingling, weakness, or loss of movement in your face, arm, or leg, especially on only one side of your body. ¨ Sudden vision changes. ¨ Sudden trouble speaking. ¨ Sudden confusion or trouble understanding simple statements. ¨ Sudden problems with walking or balance. ¨ A sudden, severe headache that is different from past headaches. Call your doctor now or seek immediate medical care if: 
· Your heartbeat feels very fast or slow, skips beats, or flutters. · You are dizzy or lightheaded, or you feel like you may faint. · You have new or increased shortness of breath. · You have pain that does not get better after you take pain medicine. · You have loose stitches, or your incision comes open. · Bright red blood has soaked through the bandage over your incision. · You have signs of infection, such as: 
¨ Increased pain, swelling, warmth, or redness. ¨ Red streaks leading from the incision. ¨ Pus draining from the incision. ¨ A fever. · You have signs of a blood clot, such as: 
¨ Pain in your calf, back of the knee, thigh, or groin. ¨ Redness and swelling in your leg or groin. Watch closely for changes in your health, and be sure to contact your doctor if: 
· You have any problems with your pacemaker. Where can you learn more? Go to http://leandroRent Jungleana luisa.info/ Enter G550 in the search box to learn more about \"Pacemaker Placement: What to Expect at Home. \" 
© 2605-4616 [employer]. Care instructions adapted under license by [employer] (which disclaims liability or warranty for this information). This care instruction is for use with your licensed healthcare professional. If you have questions about a medical condition or this instruction, always ask your healthcare professional. [place] [address] any warranty or liability for your use of this information. Content Version: 17.6.080205; Current as of: January 27, 2016 (modified 8/31/16). Discharge Instructions Attachments/References CEPHALEXIN (BY MOUTH) (ENGLISH) PREDIABETES (ENGLISH) Discharge Orders None Introducing [facility]! Dear Harika Party: Thank you for requesting a "[employer] account. Our records indicate that you already have an active "[employer] account.   You can access your account anytime at [email or web address] Did you know that you can access your hospital and ER discharge instructions at any time in Qminder? You can also review all of your test results from your hospital stay or ER visit. Additional Information If you have questions, please visit the Frequently Asked Questions section of the [employer] website at [email or web address] Remember, Qminder is NOT to be used for urgent needs. For medical emergencies, dial 911. Now available from your iPhone and Android! General Information Please provide this summary of care documentation to your next provider. Patient Signature:  ____________________________________________________________ Date:  ____________________________________________________________  
  
Tremayne Keating Provider Signature:  ____________________________________________________________ Date:  ____________________________________________________________ More Information Cephalexin (By mouth) Cephalexin (swc-o-EVO-in) Treats infections. This medicine is a cephalosporin antibiotic. Brand Name(s): Michaela Piedmont There may be other brand names for this medicine. When This Medicine Should Not Be Used: This medicine is not right for everyone. Do not use this medicine if you had an allergic reaction to cephalexin or another cephalosporin medicine. How to Use This Medicine:  
Capsule, Tablet, Tablet for Suspension, Liquid · Your doctor will tell you how much medicine to use. Do not use more than directed. · Read and follow the patient instructions that come with this medicine. Talk to your doctor or pharmacist if you have any questions. · You may take your medicine with food or milk to avoid stomach upset. · Oral liquid: Shake well just before use. Measure the oral liquid medicine with a marked measuring spoon, oral syringe, or medicine cup. · Take all of the medicine in your prescription to clear up your infection, even if you feel better after the first few doses. · Missed dose: Take a dose as soon as you remember. If it is almost time for your next dose, wait until then and take a regular dose. Do not take extra medicine to make up for a missed dose. · Capsule or tablet: Store at room temperature away from heat, moisture, and direct light. · Oral liquid: Store in the refrigerator for 14 days. After 14 days, throw away any unused medicine. Do not freeze. Drugs and Foods to Avoid: Ask your doctor or pharmacist before using any other medicine, including over-the-counter medicines, vitamins, and herbal products. · Some medicines and foods can affect how cephalexin works. Tell your doctor if you are also using ¨ Metformin ¨ Probenecid Warnings While Using This Medicine: · Tell your doctor if you are pregnant or breastfeeding, or if you have kidney disease, liver disease, or a history of digestive problems, such as colitis. Tell your doctor if you are allergic to penicillin. · This medicine can cause diarrhea. Call your doctor if the diarrhea becomes severe, does not stop, or is bloody. Do not take any medicine to stop diarrhea until you have talked to your doctor. Diarrhea can occur 2 months or more after you stop taking this medicine. · Tell any doctor or dentist who treats you that you are using this medicine. This medicine may affect certain medical test results. · Call your doctor if your symptoms do not improve or if they get worse. · Keep all medicine out of the reach of children. Never share your medicine with anyone. Possible Side Effects While Using This Medicine:  
Call your doctor right away if you notice any of these side effects: · Allergic reaction: Itching or hives, swelling in your face or hands, swelling or tingling in your mouth or throat, chest tightness, trouble breathing · Blistering, peeling, red skin rash · Severe diarrhea, especially if bloody or ongoing · Severe stomach pain, vomiting If you notice these less serious side effects, talk with your doctor: · Mild diarrhea or nausea If you notice other side effects that you think are caused by this medicine, tell your doctor. Call your doctor for medical advice about side effects. You may report side effects to FDA at 2-638-FDA-8194 © 2017 [address] Information is for End User's use only and may not be sold, redistributed or otherwise used for commercial purposes. The above information is an  only. It is not intended as medical advice for individual conditions or treatments. Talk to your doctor, nurse or pharmacist before following any medical regimen to see if it is safe and effective for you. Prediabetes: Elevated Hgb A1c 6.5 (3/26/17), average daily glucose 140 for 120 days prior to test date. Your Care Instructions Prediabetes is a warning sign that you are at risk for getting type 2 diabetes. It means that your blood sugar is higher than it should be. The food you eat turns into sugar, which your body uses for energy. Normally, an organ called the pancreas makes insulin, which allows the sugar in your blood to get into your body's cells. But when your body can't use insulin the right way, the sugar doesn't move into cells. It stays in your blood instead. This is called insulin resistance. The buildup of sugar in the blood causes prediabetes. The good news is that lifestyle changes may help you get your blood sugar back to normal and help you avoid or delay diabetes. Follow-up care is a key part of your treatment and safety. Be sure to make and go to all appointments, and call your doctor if you are having problems. It's also a good idea to know your test results and keep a list of the medicines you take. How can you care for yourself at home? · Watch your weight. A healthy weight helps your body use insulin properly. · Limit the amount of calories, sweets, and unhealthy fat you eat. Ask your doctor if you should see a dietitian. A registered dietitian can help you create meal plans that fit your lifestyle. · Get at least 30 minutes of exercise on most days of the week. Exercise helps control your blood sugar. It also helps you maintain a healthy weight. Walking is a good choice. You also may want to do other activities, such as running, swimming, cycling, or playing tennis or team sports. · Do not smoke. Smoking can make prediabetes worse. If you need help quitting, talk to your doctor about stop-smoking programs and medicines. These can increase your chances of quitting for good. · If your doctor prescribed medicines, take them exactly as prescribed. Call your doctor if you think you are having a problem with your medicine. You will get more details on the specific medicines your doctor prescribes. When should you call for help? Watch closely for changes in your health, and be sure to contact your doctor if: 
· You have any symptoms of diabetes. These may include: ¨ Being thirsty more often. ¨ Urinating more. ¨ Being hungrier. ¨ Losing weight. ¨ Being very tired. ¨ Having blurry vision. · You have a wound that will not heal. 
· You have an infection that will not go away. · You have problems with your blood pressure. · You want more information about diabetes and how you can keep from getting it. Where can you learn more? Go to http://leandro-ana luisa.info/. Enter I222 in the search box to learn more about \"Prediabetes: Care Instructions. \" Current as of: March 13, 2017 (modified 6/24/17). Content Version: 11.3 © 7643-0820 [employer].  Care instructions adapted under license by [employer] (which disclaims liability or warranty for this information). If you have questions about a medical condition or this instruction, always ask your healthcare professional. [place] [address] any warranty or liability for your use of this information.

## 2017-06-23 NOTE — PROGRESS NOTES
12:59 PM Received patient from waiting area. Armband and allergies verbally confirmed with patient. Procedure explained and consents signed.

## 2017-06-23 NOTE — H&P
HISTORY OF PRESENTING ILLNESS      Luís Guerrero is a 74 y.o. male with hypercholesteremia, hypertension, CVA with ILR who was noted to have 3 and 6 second pauses on his ILR despite discontinuation of his atenolol. He now presents for implantation of a pacemaker.         ACTIVE PROBLEM LIST     Patient Active Problem List    Diagnosis Date Noted    Heart block 06/20/2017    Acute CVA (cerebrovascular accident) ([facility] [address] 03/26/2017    High cholesterol 03/25/2017    HTN (hypertension) 03/25/2017    Obesity (BMI 30-39.9) 03/25/2017           PAST MEDICAL HISTORY     Past Medical History:   Diagnosis Date    Hypercholesteremia     Hypertension     Migraines     Obesity (BMI 30-39.9) 3/25/2017    Stroke ([facility] [address]            PAST SURGICAL HISTORY     Past Surgical History:   Procedure Laterality Date    HX ORTHOPAEDIC      knees          ALLERGIES     Allergies   Allergen Reactions    Lipitor [Atorvastatin] Rash    Macrolide Antibiotics Hives    Niacin Rash    Other Medication Hives     All mycin medication groups/family           FAMILY HISTORY     Family History   Problem Relation Age of Onset    Stroke Father    [facility] Migraines Sister     Hypertension Neg Hx     Heart Disease Neg Hx     negative for cardiac disease       SOCIAL HISTORY     Social History     Social History    Marital status:      Spouse name: N/A    Number of children: N/A    Years of education: N/A     Social History Main Topics    Smoking status: Former Smoker     Quit date: 1977    Smokeless tobacco: Never Used    Alcohol use 1.2 oz/week     2 Shots of liquor per week    Drug use: No    Sexual activity: Yes     Other Topics Concern    Not on file     Social History Narrative         MEDICATIONS     Current Facility-Administered Medications   Medication Dose Route Frequency    sodium chloride (NS) flush 5-10 mL  5-10 mL IntraVENous Q8H    sodium chloride (NS) flush 5-10 mL  5-10 mL IntraVENous PRN       I have reviewed the nurses notes, vitals, problem list, allergy list, medical history, family, social history and medications. REVIEW OF SYMPTOMS      General: Pt denies excessive weight gain or loss. Pt is able to conduct ADL's  HEENT: Denies blurred vision, headaches, hearing loss, epistaxis and difficulty swallowing. Respiratory: Denies cough, congestion, shortness of breath, SOLANO, wheezing or stridor. Cardiovascular: Denies precordial pain, palpitations, edema or PND  Gastrointestinal: Denies poor appetite, indigestion, abdominal pain or blood in stool  Genitourinary: Denies hematuria, dysuria, increased urinary frequency  Musculoskeletal: Denies joint pain or swelling from muscles or joints  Neurologic: Denies tremor, paresthesias, headache, or sensory motor disturbance  Psychiatric: Denies confusion, insomnia, depression  Integumentray: Denies rash, itching or ulcers. Hematologic: Denies easy bruising, bleeding       PHYSICAL EXAMINATION      Vitals:    06/23/17 1255 06/23/17 1301   BP:  (!) 175/93   Pulse:  83   Resp:  16   Temp:  98 °F (36.7 °C)   SpO2:  95%   Weight: 225 lb 15.5 oz (102.5 kg)    Height: 6' (1.829 m)      General: Well developed, in no acute distress. HEENT: No jaundice, oral mucosa moist, no oral ulcers  Neck: Supple, no stiffness, no lymphadenopathy, supple  Heart:  Normal S1/S2 negative S3 or S4. Regular, no murmur, gallop or rub, no jugular venous distention  Respiratory: Clear bilaterally x 4, no wheezing or rales  Abdomen:   Soft, non-tender, bowel sounds are active.   Extremities:  No edema, normal cap refill, no cyanosis. Musculoskeletal: No clubbing, no deformities  Neuro: A&Ox3, speech clear, gait stable, cooperative, no focal neurologic deficits  Skin: Skin color is normal. No rashes or lesions.  Non diaphoretic, moist.  Vascular: 2+ pulses symmetric in all extremities       DIAGNOSTIC DATA      EKG:        LABORATORY DATA      Lab Results   Component Value Date/Time    WBC 7.8 06/23/2017 01:07 PM    HGB 17.7 06/23/2017 01:07 PM    HCT 51.8 06/23/2017 01:07 PM    PLATELET 137 57/40/7903 01:07 PM    MCV 91.8 06/23/2017 01:07 PM      Lab Results   Component Value Date/Time    Sodium 141 06/23/2017 01:07 PM    Potassium 4.3 06/23/2017 01:07 PM    Chloride 103 06/23/2017 01:07 PM    CO2 31 06/23/2017 01:07 PM    Anion gap 7 06/23/2017 01:07 PM    Glucose 93 06/23/2017 01:07 PM    BUN 17 06/23/2017 01:07 PM    Creatinine 1.14 06/23/2017 01:07 PM    BUN/Creatinine ratio 15 06/23/2017 01:07 PM    GFR est AA >60 06/23/2017 01:07 PM    GFR est non-AA >60 06/23/2017 01:07 PM    Calcium 9.2 06/23/2017 01:07 PM    Bilirubin, total 0.5 03/25/2017 10:13 AM    AST (SGOT) 18 03/25/2017 10:13 AM    Alk. phosphatase 113 03/25/2017 10:13 AM    Protein, total 8.4 03/25/2017 10:13 AM    Albumin 3.9 03/25/2017 10:13 AM    Globulin 4.5 03/25/2017 10:13 AM    A-G Ratio 0.9 03/25/2017 10:13 AM    ALT (SGPT) 21 03/25/2017 10:13 AM           ASSESSMENT      1. Bradycardia/SSS  2. CVA  3. Hypertension  4.  Hyperlipidemia         PLAN     PPM implantation         Kavitha Levi MD  Cardiac Electrophysiology / Cardiology    [facility] [address]  [address], [address] [facility], [address]  [address]    [facility], [address]  [phone] / [phone] Fax   [phone] / [phone] Fax

## 2017-06-23 NOTE — IP AVS SNAPSHOT
Current Discharge Medication List  
  
START taking these medications Dose & Instructions Dispensing Information Comments Morning Noon Evening Bedtime  
 cephALEXin 500 mg capsule Commonly known as:  Cheyanne Figueredo Your last dose was: Your next dose is:    
   
   
 Dose:  500 mg Take 1 Cap by mouth three (3) times daily for 5 days. Quantity:  15 Cap Refills:  0 CONTINUE these medications which have NOT CHANGED Dose & Instructions Dispensing Information Comments Morning Noon Evening Bedtime ALEVE 220 mg tablet Generic drug:  naproxen sodium Your last dose was: Your next dose is:    
   
   
 Dose:  220 mg Take 220 mg by mouth daily as needed. Refills:  0  
     
   
   
   
  
 aspirin 81 mg chewable tablet Your last dose was: Your next dose is:    
   
   
 Dose:  81 mg Take 1 Tab by mouth daily. Indications: Acute Coronary Syndrome Refills:  0  
     
   
   
   
  
 lisinopril 5 mg tablet Commonly known as:  Juan Daft Your last dose was: Your next dose is:    
   
   
 Dose:  5 mg Take 1 Tab by mouth daily. Quantity:  30 Tab Refills:  6  
     
   
   
   
  
 loratadine 10 mg tablet Commonly known as:  Al Alvine Your last dose was: Your next dose is:    
   
   
 Dose:  10 mg Take 10 mg by mouth. Refills:  0 PRAVACHOL 20 mg tablet Generic drug:  pravastatin Your last dose was: Your next dose is:    
   
   
 Dose:  20 mg Take 20 mg by mouth nightly. Refills:  0 Where to Get Your Medications These medications were sent to [address] [place] [address] - [address]  [address] [facility], [address] [place] [address] Phone:  [phone]  
  cephALEXin 500 mg capsule

## 2017-06-23 NOTE — PROCEDURES
Cardiac Electrophysiology Report      PATIENT INFORMATION     Patient Name: Bettye Laurent  MRN: [id number]       Study Date: 2017    YOB: 1950   Age: 74 y.o. Gender: male      Procedure:  Loop Recorder Removal    Referring Physician:  Arnoldo Gay MD        STAFF     Duty Name   Electrophysiologist Janie Marshall MD   Monitor Светлана Junior RN; Preston Villavicencio RN   Circulator Gloria Morris RN       PATIENT HISTORY     Bettye Laurent is a 74 y.o. male with hypercholesteremia, hypertension, CVA with ILR who was noted to have 3 and 6 second pauses on his ILR despite discontinuation. He presents for removal of his ILR prior to PPM implantation.         PROCEDURE     The patient was brought to the Cardiac Electrophysiology laboratory in a post-absorptive, fasting state. Informed consent was obtained. A peripheral IV was in place. Continuous electrocardiographic, blood pressure, O2 saturation and  CO2 monitoring was initiated. Pre-operative antibiotics were administered pre-operatively. Self-adhesive cardioversion patches were positioned on the chest.  Conscious sedation was effectuated according to protocol. The patient was then prepped and draped in the usual sterile fashion. A 50/50 mixture of lidocaine (1%) with epinephrine and bupivicaine (0.5%) was utilized for local anesthesia. A blunt incision was delivered over the loop recorder injection site and the loop recorder was retrieved. 3-0 monocryl and Dermabond adhesive glue was applied to the skin. The patient remained hemodynamically stable, tolerated the procedure well and was transferred in stable condition. There were no immediate complications encountered during the procedure. There was minimal blood loss and no specimen were removed.        LEAD & GENERATOR DATA       Model # Serial #   Generator Medtronic [id number]       MEDICATION SUMMARY     Medication Route Unit Total   Ancef IV grams 1   Fentanyl IV micrograms 100   Versed IV grams 5       RADIOLOGY SUMMARY     N/A      CONCLUSIONS     1. Successful removal of loop recorder. 2. Wound check in EP clinic in 7 days. 3. Oral antibiotics x 5 days. 4.  Follow up in EP clinic in 1 month or earlier if necessary. 5. Follow up with  Carmelo Talley MD as scheduled. Thank you Carmelo Talley MD for allowing me to participate in the care of this extraordinarily pleasant male.         Marco Limon MD  Cardiac Electrophysiology / Cardiology    [address], [address] [facility], [address]  [address]  [phone] / [phone] Fax [phone] / [phone] Fax

## 2017-06-23 NOTE — PROCEDURES
Cardiac Electrophysiology Report      PATIENT INFORMATION     Patient Name: Eugene Nichols  MRN: [id number]                Study Date: 2017    YOB: 1950   Age: 74 y.o. Gender: male      Procedure:  Danni Moore    Referring Physician:  Leopoldo Hartman MD         STAFF     Duty Name   Electrophysiologist Angela Griffin MD   Monitor Marciano Gray RN; Will Bustamante RN   Circulator Kari Carrizales RN       PATIENT HISTORY     Eugene Nichols is a 74 y.o. male with hypercholesteremia, hypertension, CVA with ILR who was noted to have 3 and 6 second pauses on his ILR despite discontinuation of atenolol. He presents for pacemaker implantation as a result. PROCEDURE     The patient was brought to the Cardiac Electrophysiology laboratory in a post-absorptive, fasting state. Informed consent was obtained. A peripheral IV was in place. Continuous electrocardiographic, blood pressure, O2 saturation and  CO2 monitoring was initiated. Intravenous antibiotics were administered pre-operatively. Self-adhesive cardioversion patches were positioned on the chest.  Conscious sedation was effectuated according to protocol. The patient was then prepped and draped in the usual sterile fashion. A left subclavian venogram was performed and demonstrated patency of the vein. A 50/50 mixture of lidocaine (1%) with epinephrine and bupivicaine (0.5%) was utilized for local anesthesia. An incision was performed medial to the left delto-pectoral groove. Sharp and blunt dissection was carried down to the level of the pre-pectoralis fascia. Hemostasis was maintained with electrocautery. Extra-thoracic, subclavian venous access was obtained twice and sheaths were placed using the modified Seldinger technique.  Permanent pace/sense leads were advanced under fluoroscopic guidance and positioned in the right atrium and ventricle where stable pacing and sensing characteristics were encountered. The sheaths were peeled away and the leads were anchored to the pre-pectoralis fascia using O-ethibond non-absorbable suture material. A device pocket was then fashioned and flushed copiously with antibiotic solution. A pacemaker generator was connected to the leads and the generator was placed into the pocket. The device was secured to the pocket using O-ethibond non-absorbable suture material. The pocket was then closed in three layers using 2-O vicryl absorbable suture material, absorbable staples and Dermabond. The skin was closed using a sub-cuticular technique. A bio-occlusive dressing were applied to the skin. The patient remained hemodynamically stable, tolerated the procedure well and was transferred in stable condition. There were no immediate complications encountered during the procedure. There was minimal blood loss and no specimen were removed. LEAD & GENERATOR DATA       Model # Serial #   Generator Medtronic [id number]   Atrial Lead Medtronic [id number]   Ventricular Lead Medtronic 8582 [id number]       PACE/SENSE DATA      Sensed Wave (mV) Threshold (V) Impedance (Ohms)   Atrium 6.2 1.0 888   Ventricle 10.3 0.8 1124       FINAL PROGRAMMING     Mode Lower Rate (ppm) Upper Rate (ppm)   AAI-DDD 60 130       MEDICATION SUMMARY     Medication Route Unit Total   Ancef IV grams 2   Fentanyl IV micrograms 100   Versed IV grams 5       RADIOLOGY SUMMARY      Total   Fluoro Time (minutes)       Dose Area Product (mGy)        CONCLUSIONS     1. Successful implantation of a dual-chamber pacemaker. 2. IV antibiotics x 24 hours for 3 doses followed by Keflex 500 mg po tid x 5 days. 3. Monitor overnight on telemetry. 4. CXR (PA & lateral) and device interrogation in AM.  5. Possible discharge in AM.  6. Wound check in EP clinic in 7 days. 7. Follow up in EP clinic in 1 month or earlier if necessary. 8. Follow up with  Pramod Fiore MD as scheduled.         Thank you, Saurabh Gao MD for involving me in the care of this extraordinarily pleasant male.        Rachael Champagne MD  Cardiac Electrophysiology / Cardiology    [address]  [address] [facility] [[email or web address]], [address] [facility] [address], [address]  [facility] [place] [address]        Erwin Harrell  [phone] / [phone] Fax      [phone] / [phone] Fax

## 2017-06-24 VITALS
OXYGEN SATURATION: 98 % | HEART RATE: 60 BPM | SYSTOLIC BLOOD PRESSURE: 170 MMHG | BODY MASS INDEX: 30.61 KG/M2 | HEIGHT: 72 IN | RESPIRATION RATE: 18 BRPM | WEIGHT: 225.97 LBS | DIASTOLIC BLOOD PRESSURE: 89 MMHG | TEMPERATURE: 97.8 F

## 2017-06-24 PROCEDURE — 99218 HC RM OBSERVATION: CPT

## 2017-06-24 PROCEDURE — 74011250637 HC RX REV CODE- 250/637: Performed by: SPECIALIST

## 2017-06-24 PROCEDURE — 74011250637 HC RX REV CODE- 250/637: Performed by: INTERNAL MEDICINE

## 2017-06-24 PROCEDURE — 74011250636 HC RX REV CODE- 250/636: Performed by: INTERNAL MEDICINE

## 2017-06-24 RX ORDER — ATENOLOL 50 MG/1
50 TABLET ORAL DAILY
Status: DISCONTINUED | OUTPATIENT
Start: 2017-06-24 | End: 2017-06-24 | Stop reason: HOSPADM

## 2017-06-24 RX ORDER — LISINOPRIL 5 MG/1
5 TABLET ORAL ONCE
Status: COMPLETED | OUTPATIENT
Start: 2017-06-24 | End: 2017-06-24

## 2017-06-24 RX ORDER — HYDRALAZINE HYDROCHLORIDE 20 MG/ML
20 INJECTION INTRAMUSCULAR; INTRAVENOUS
Status: DISCONTINUED | OUTPATIENT
Start: 2017-06-24 | End: 2017-06-24 | Stop reason: HOSPADM

## 2017-06-24 RX ADMIN — HYDRALAZINE HYDROCHLORIDE 20 MG: 20 INJECTION INTRAMUSCULAR; INTRAVENOUS at 06:13

## 2017-06-24 RX ADMIN — Medication 10 ML: at 06:14

## 2017-06-24 RX ADMIN — LISINOPRIL 5 MG: 5 TABLET ORAL at 08:24

## 2017-06-24 RX ADMIN — ACETAMINOPHEN 650 MG: 325 TABLET ORAL at 12:13

## 2017-06-24 RX ADMIN — ASPIRIN 81 MG 81 MG: 81 TABLET ORAL at 08:24

## 2017-06-24 RX ADMIN — CEFAZOLIN 2 G: 1 INJECTION, POWDER, FOR SOLUTION INTRAMUSCULAR; INTRAVENOUS; PARENTERAL at 14:06

## 2017-06-24 RX ADMIN — Medication 10 ML: at 14:06

## 2017-06-24 RX ADMIN — ATENOLOL 50 MG: 50 TABLET ORAL at 13:07

## 2017-06-24 RX ADMIN — LISINOPRIL 5 MG: 5 TABLET ORAL at 13:07

## 2017-06-24 RX ADMIN — CEFAZOLIN 2 G: 1 INJECTION, POWDER, FOR SOLUTION INTRAMUSCULAR; INTRAVENOUS; PARENTERAL at 07:42

## 2017-06-24 NOTE — PROGRESS NOTES
Cardiac Electrophysiology Discharge Summary       Patient ID:  Sumit Akins  [id number]  7 y.o.  1950    Admit Date: 6/23/2017    Discharge Date: 6/24/2017     Admitting Physician: Radha Busch MD     Discharge Physician: Radha Busch MD    Admission Diagnoses: PPI dual;PM;PPI dual    Discharge Diagnoses: Active Problems:    * No active hospital problems. *      Discharge Condition: stable    Cardiology Procedures this Admission:  PPM implant; ILR removal    Hospital Course: Patient underwent above procedure without complication and remained stable without acute events. Discharge Exam:  Visit Vitals    /84    Pulse 91    Temp 97.8 °F (36.6 °C)    Resp 12    Ht 6' (1.829 m)    Wt 225 lb 15.5 oz (102.5 kg)    SpO2 97%    BMI 30.65 kg/m2       Abdomen: soft, non-tender  Extremities: extremities normal  Heart: regular rate and rhythm  Lungs: clear to auscultation bilaterally  Neck: no JVD  Neurologic: Grossly normal  Pulses: 2+ and symmetric    Disposition: Home    Patient Instructions:   Current Discharge Medication List      CONTINUE these medications which have NOT CHANGED    Details   lisinopril (PRINIVIL, ZESTRIL) 5 mg tablet Take 1 Tab by mouth daily. Qty: 30 Tab, Refills: 6      naproxen sodium (ALEVE) 220 mg tablet Take 220 mg by mouth daily as needed. loratadine (CLARITIN) 10 mg tablet Take 10 mg by mouth.      pravastatin (PRAVACHOL) 20 mg tablet Take 20 mg by mouth nightly. aspirin 81 mg chewable tablet Take 1 Tab by mouth daily. Indications: Acute Coronary Syndrome      cephALEXin (KEFLEX) 500 mg capsule Take 1 Cap by mouth three (3) times daily for 5 days. Qty: 15 Cap, Refills: 0             Referenced discharge instructions provided by nursing for diet and activity.     Follow-up with Radha Busch MD             Signed:  Bel Plaza MD  Cardiac Electrophysiology  6/24/2017  8:26 AM

## 2017-06-24 NOTE — DISCHARGE INSTRUCTIONS
Pacemaker  Discharge Instructions    Please make sure you have received your Temporary Pacemaker identification card with your discharge instructions      MEDICATIONS         Take only the medications prescribed to you at discharge. ACTIVITY         Return to your normal activity, except as noted below. o Do not lift anything heavier than 10 pounds for 4 weeks with the affected arm. This is how long it takes the muscles to heal, and the leads inside your heart to stabilize their position. o Do not reach above your head with the affected arm for 4 weeks, doing so increases the risk of lead dislodgement.    o It is, however, important to move the affected arm to prevent shoulder stiffness and locking. o Avoid tight clothes or unnecessary pressure over your incision (such as bra straps or seat belts). If it is tender or sensitive to clothing, cover the incision with a soft dressing or pad.  o Questions about driving are individualized and should be discussed with one of the EP Physicians prior to discharge. SHOWERING         Leave the bandage over your incision for 7 days after the Pacemaker implant. You bandage will be removed in clinic in 7 days.  It is important to keep the bandaged area clean and dry. You may shower around the site until the bandage is removed in clinic. Thereafter, you may shower after the bandage is removed, washing it gently with soap and water. Do not apply any lotions, powders, or perfumes to the incision line.  Avoid submerging your incision in water (tub baths, hot tubs, or swimming) for four weeks.  Underneath the dressing. o If you have white steri-strips over your incision (underneath the gauze dressing), they will curl up at the end and fall off, usually within 10 days. Do not pull them off.  - OR -   o You may have a different type of closure for the incision.   If Dermabond Adhesive was used to close your incision, you will receive a separate instruction sheet. DISCHARGE PRECAUTIONS         Record your temperature every day, at the same time, for 3 weeks after your implant. A temperature of 100.5 F, or higher, can be the first sign of infection. This should be reported to your Doctor immediately.  You can have an MRI after 6 weeks. You must be aware that any strong magnet or magnetic field can affect your Pacemaker. In general, be careful of metal detectors, heavy machinery, and any area where arc-welding is performed. Avoid metal detectors such as the ones in security checkpoints at [facility] or [address] [place] [address]. When approaching a security checkpoint show your Pacemaker ID Card to security personnel and ask to be hand searched.  Always tell your doctor or dentist that you have a Pacemaker. Antibiotics may be prescribed before certain procedures.  If you use a cell phone, hold it on the opposite side from where your Pacemaker is implanted.  Your temporary identification will be given to you with these instructions. Keep your Pacemaker card in your wallet or on your person at all times. You should receive your permanent card in 8 weeks. If you do not receive your permanent card, please call the office at [phone]. TAKING YOUR PULSE         Take your pulse the same time every day, preferably in the morning.  Sit down and rest for 5 minutes prior to taking your pulse.  Take your pulse for 1 full minute, use a clock or stop watch with a second hand.  To feel your pulse, use the first two fingers of one hand; place them on the thumb side of the wrist of the opposite hand. The pulse will be steady, regular and throbbing.  Call the EP Lab Doctors if your pulse is less than 40 beats per minute. SYMPTOMS THAT NEED TO BE REPORTED IMMEDIATELY         Temperature more than 100.4 F     Redness or warmth at the incision site, or pain for longer than the first 5 days after the implant.      Drainage from the incision site.  Swelling around the incision site.  Shortness of breath.  Rapid heart rate or palpitations.  Dizziness, lightheadedness, fainting.  Slow pulse below 40 beats per minute.  REMEMBER: If you feel something is an emergency or cannot be handled over the phone, call 911 or go to the closest emergency room. Hany Quintanilla MD  Cardiac Electrophysiology / Cardiology    [facility]  [address] [place] [address], [address] [facility], [address], [address] [place] [address], [facility]  [phone] / [phone] Fax       [phone] / [phone] Fax          Pacemaker Placement: What to Expect at [address] [place] [address]     Pacemaker placement is surgery to put a pacemaker in your chest. A pacemaker is a small, battery-powered device that sends electrical signals to the heart to keep the heartbeat steady. Thin wires, called leads, carry the signals from the pacemaker to the heart. A pacemaker can prevent or reduce dizziness, fainting, and shortness of breath caused by a slow or unsteady heartbeat. Your chest may be sore where the doctor made the cut (incision) and put in the pacemaker. You also may have a bruise and mild swelling. These symptoms usually get better in 1 to 2 weeks. You may feel a hard ridge along the incision. This usually gets softer in the months after surgery. You may be able to see or feel the outline of the pacemaker under your skin. You will probably be able to go back to work or your usual routine 1 to 2 weeks after surgery. Pacemaker batteries usually last 5 to 15 years. Your doctor will talk to you about how often you will need to have your pacemaker checked. When you have a pacemaker, it is important to avoid electrical devices that can stop your pacemaker from working right.  Check with your doctor about what you need to stay away from, what you need to use with care, and what is okay to use. You will need to stay away from things with strong magnetic and electrical fields such as an MRI machine (unless your pacemaker is safe for an MRI), welding equipment, and power generators. You can use a cell phone, but keep it at least 6 inches away from your pacemaker. You can safely use most household and office electronics such as kitchen appliances, electric power tools, and computers. This care sheet gives you a general idea about how long it will take for you to recover. But each person recovers at a different pace. Follow the steps below to get better as quickly as possible. How can you care for yourself at home? Activity  · Rest when you feel tired. Getting enough sleep will help you recover. · Try to walk each day. Start by walking a little more than you did the day before. Bit by bit, increase the amount you walk. Walking boosts blood flow and helps prevent pneumonia and constipation. · For first 4 weeks:  ¨ Avoid activities that strain your chest or upper arm muscles. This includes pushing a  or vacuum, mopping floors, swimming, or swinging a golf club or tennis racquet. ¨ Do not raise your arm, on the side of your body where the pacemaker is located, above your shoulder. ¨ Avoid strenuous activities, such as bicycle riding, jogging, weight lifting, or heavy aerobic exercise. ¨ Avoid lifting anything that would make you strain. This may include heavy grocery bags and milk containers, a heavy briefcase or backpack, cat litter or dog food bags, or a child. · Ask your doctor when you can drive again. · You will probably need to take about 1 to 2 weeks off from work. It depends on the type of work you do and how you feel. · Ask your doctor when it is okay for you to have sex. Diet  · You can eat your normal diet.  If your stomach is upset, try bland, low-fat foods like plain rice, broiled chicken, toast, and yogurt. · Drink plenty of fluids (unless your doctor tells you not to). Medicines  · Your doctor will tell you if and when you can restart your medicines. He or she will also give you instructions about taking any new medicines. · If you take blood thinners, such as warfarin (Coumadin), clopidogrel (Plavix), Xarelto, or aspirin, be sure to talk to your doctor. He or she will tell you if and when to start taking those medicines again. Make sure that you understand exactly what your doctor wants you to do. · Be safe with medicines. Take pain medicines exactly as directed. ¨ If the doctor gave you a prescription medicine for pain, take it as prescribed. ¨ If you are not taking a prescription pain medicine, ask your doctor if you can take an over-the-counter medicine. ¨ Do not take aspirin, ibuprofen (Advil, Motrin), naproxen (Aleve), or other nonsteroidal anti-inflammatory drugs (NSAIDs) unless your doctor says it is okay. · If you think your pain medicine is making you sick to your stomach:  ¨ Take your medicine after meals (unless your doctor has told you not to). ¨ Ask your doctor for a different pain medicine. · If your doctor prescribed antibiotics, take them as directed. Do not stop taking them just because you feel better. You need to take the full course of antibiotics. Incision care  · With dressing on, you may take a shower; however, keep shower spray to your back. Do not soak the dressing site. · Your dressing will be removed at your first check up at cardiologist's office. · Keep the incision dry while it heals. Don't use hydrogen peroxide or alcohol on the incision, which can slow healing. · Do not take a bath or get into a hot tub until your doctor tells you it is okay. Other instructions  · Keep a medical ID card with you at all times that says you have a pacemaker. The card should include the  and model information.   · Wear medical alert jewelry that states you have a pacemaker. You can buy this at most drugstores. · Check your pulse as directed by your doctor. · Have your pacemaker checked as often as your doctor recommends. In some cases, this may be done over the phone or the Internet. Your doctor will give you instructions about how to do this. Follow-up care is a key part of your treatment and safety. Be sure to make and go to all appointments, and call your doctor if you are having problems. It's also a good idea to know your test results and keep a list of the medicines you take. When should you call for help? Call 911 anytime you think you may need emergency care. For example, call if:  · You passed out (lost consciousness). · You have severe trouble breathing. · You have sudden chest pain and shortness of breath, or you cough up blood. · You have symptoms of a heart attack. These may include:  ¨ Chest pain or pressure, or a strange feeling in the chest..  ¨ Sweating. ¨ Shortness of breath. ¨ Nausea or vomiting. ¨ Pain, pressure, or a strange feeling in the back, neck, jaw, or upper belly or in one or both shoulders or arms. ¨ Lightheadedness or sudden weakness. ¨ A fast or irregular heartbeat. After you call 911, the  may tell you to chew 1 adult-strength or 2 to 4 low-dose aspirin. Wait for an ambulance. Do not try to drive yourself. · You have symptoms of a stroke. These may include:  ¨ Sudden numbness, tingling, weakness, or loss of movement in your face, arm, or leg, especially on only one side of your body. ¨ Sudden vision changes. ¨ Sudden trouble speaking. ¨ Sudden confusion or trouble understanding simple statements. ¨ Sudden problems with walking or balance. ¨ A sudden, severe headache that is different from past headaches. Call your doctor now or seek immediate medical care if:  · Your heartbeat feels very fast or slow, skips beats, or flutters. · You are dizzy or lightheaded, or you feel like you may faint.   · You have new or increased shortness of breath. · You have pain that does not get better after you take pain medicine. · You have loose stitches, or your incision comes open. · Bright red blood has soaked through the bandage over your incision. · You have signs of infection, such as:  ¨ Increased pain, swelling, warmth, or redness. ¨ Red streaks leading from the incision. ¨ Pus draining from the incision. ¨ A fever. · You have signs of a blood clot, such as:  ¨ Pain in your calf, back of the knee, thigh, or groin. ¨ Redness and swelling in your leg or groin. Watch closely for changes in your health, and be sure to contact your doctor if:  · You have any problems with your pacemaker. Where can you learn more? Go to http://leandro-ana luisa.info/  Enter G550 in the search box to learn more about \"Pacemaker Placement: What to Expect at Home. \"  © 9682-4471 [employer], [employer]. Care instructions adapted under license by [employer] (which disclaims liability or warranty for this information). This care instruction is for use with your licensed healthcare professional. If you have questions about a medical condition or this instruction, always ask your healthcare professional. [place] [address] any warranty or liability for your use of this information. Content Version: 05.4.782007; Current as of: January 27, 2016 (modified 8/31/16).

## 2017-06-24 NOTE — PROGRESS NOTES
12:40 PM /81, HR 70s. Informed earlier in day to call for better BP control. Dr. Curtis Penny called back MD states to give another 5mg of lisinopril, and give 50mg atenolol, patient to resume atenolol at home, and lisinopril 10mg, Will continue to monitor patient. 2:19 PM /88, HR 60, 1hour after 5mg lisinopril and atenolol 50mg, called Dr. Andrea Miranda MD states patient is OK for DC, patient will followup outpatient, MD states to phone in RX: Atenolol 50mg, disp: 30, refill: 1, take 1 tab every day. MD states to tell patient to double up on lisinopril using patient's home RX. Will continue to monitor patient. 3:15 PM called in RX to [facility] [address]: Atenolol 50mg PO, disp: 30tabs, refill: 1, sig: take 1 take every day, instructed patient to take 10mg of PO lisinopril every day (instead of 5mg per Dr. Andrea Miranda. 3:32 PM I have reviewed discharge instructions with the patient and spouse. The patient and spouse verbalized understanding. Gave opportunity for questions. Removed peripheral IV and telemetry. Patient transported downstairs via [facility] by RN.

## 2017-06-24 NOTE — PROGRESS NOTES
Bedside and Verbal shift change report given to Naz Macario RN (oncoming nurse) by Oscar Moser (offgoing nurse). Report included the following information SBAR, Kardex, Procedure Summary, Intake/Output, MAR, Recent Results and Med Rec Status.

## 2017-06-24 NOTE — PROGRESS NOTES
0416: BP is 170/94. Patient is resting comfortable and has not complained of pain. 0533: BP rechecked and is 187/86. Called Dr. Marisel Cardona group and awaiting called from MD on call. Will continue to monitor. 36: Dr. Sinan Marquez called back and ordered HydrALAZINE  20 mg IV every 6 hrs as needed. 5477: HydrALAZINE given to patient. 0630: BP rechecked and is still high. Patient has morning BP meds. Oncoming nurse is made aware and will continue to monitor.

## 2017-06-24 NOTE — PROGRESS NOTES
Bedside and Verbal shift change report given to Mary Costa (oncoming nurse) by  Megan Buenrostro (offgoing nurse). Report included the following information SBAR, Kardex, Intake/Output, MAR, Accordion, Recent Results and Med Rec Status.

## 2017-06-30 ENCOUNTER — PATIENT OUTREACH (OUTPATIENT)
Dept: CARDIOLOGY CLINIC | Age: 67
End: 2017-06-30

## 2017-06-30 ENCOUNTER — OFFICE VISIT (OUTPATIENT)
Dept: CARDIOLOGY CLINIC | Age: 67
End: 2017-06-30

## 2017-06-30 VITALS
HEIGHT: 72 IN | WEIGHT: 226.4 LBS | SYSTOLIC BLOOD PRESSURE: 134 MMHG | DIASTOLIC BLOOD PRESSURE: 78 MMHG | RESPIRATION RATE: 15 BRPM | OXYGEN SATURATION: 97 % | BODY MASS INDEX: 30.66 KG/M2 | HEART RATE: 60 BPM

## 2017-06-30 DIAGNOSIS — Z51.89 VISIT FOR WOUND CHECK: Primary | ICD-10-CM

## 2017-06-30 RX ORDER — ATENOLOL 50 MG/1
TABLET ORAL
Refills: 1 | COMMUNITY
Start: 2017-06-24 | End: 2018-08-31

## 2017-06-30 NOTE — PROGRESS NOTES
Patient presents for wound check post-device implantation. The dressing was removed and the site was inspected. The site appeared to be well-healing without ecchymosis/tenderness/erythema. Denies pain, fevers, discharge. Plan:    Steri strips applied to medial aspect of incision. Continue follow up in device clinic as planned.      Ewell Sandhoff, NP

## 2017-06-30 NOTE — Clinical Note
Pt with dual chamber PPI/ seen in office for follow up Behzad Barillas 6/30  Medicare pt - I mailed him information/ Rebecca Sender

## 2017-06-30 NOTE — PROGRESS NOTES
Chief Complaint   Patient presents with    Wound Check       1. Have you been to the ER, urgent care clinic since your last visit? Hospitalized since your last visit? No    2. Have you seen or consulted any other health care providers outside of the [address] [place] [address] since your last visit? Include any pap smears or colon screening. No    Body mass index is 30.71 kg/(m^2).

## 2017-06-30 NOTE — PROGRESS NOTES
This note will not be viewable in [address] Nurse navigator note - cardiology- Mr. Pretty Diamond was seen by Dr. Curtis Aponte for elective implant - dual chamber PPI - and was seen in the office for follow up 6/30 - doing well -     Plan: Follow up Dr. Curtis Aponte as noted below   Pre-diabetes information sent - copy of A1C and lipid panel   Reduce saturated fats/ increased fiber, produce   Daily weights   BP and pulse monitoring - charts mailed. Krames PPI mailed     Pre - procedure note - Dr. Tequila Cole     1. Bradycardia/SSS  2. CVA  3. Hypertension  4. Hyperlipidemia        PLAN    PPM implantation   PATIENT HISTORY      Venkatesh Nye is a 74 y.o. male with hypercholesteremia, hypertension, CVA with ILR who was noted to have 3 and 6 second pauses on his ILR despite discontinuation of atenolol. He presents for pacemaker implantation as a result. Dual chamber PPI implant - Dr. Curtis Aponte - procedure note  LEAD & GENERATOR DATA         Model # Serial #   Generator Medtronic [id number]   Atrial Lead Medtronic [id number]   Ventricular Lead Medtronic 6585 [id number]         PACE/SENSE DATA        Sensed Wave (mV) Threshold (V) Impedance (Ohms)   Atrium 6.2 1.0 888   Ventricle 10.3 0.8 1124         FINAL PROGRAMMING      Mode Lower Rate (ppm) Upper Rate (ppm)   AAI-DDD 60 130      CONCLUSIONS      1. Successful implantation of a dual-chamber pacemaker. 2. IV antibiotics x 24 hours for 3 doses followed by Keflex 500 mg po tid x 5 days. 3. Monitor overnight on telemetry. 4. CXR (PA & lateral) and device interrogation in AM.  5. Possible discharge in AM.  6. Wound check in EP clinic in 7 days. - accomplished 6/30/17   7. Follow up in EP clinic in 1 month or earlier if necessary. 8. Follow up with  Aileen Jacobson MD as scheduled.    ______________________________________________________________________________  Office visit with NP 6/30/17 - Tirso Anchors  Progress Notes   Rene Perea NP (Nurse Practitioner) Kofi Goldman Nurse Practitioner      Patient presents for wound check post-device implantation. The dressing was removed and the site was inspected. The site appeared to be well-healing without ecchymosis/tenderness/erythema. Denies pain, fevers, discharge.      Plan:     Steri strips applied to medial aspect of incision.   Continue follow up in device clinic as planned.      Kina Son, NP        Pt to return to pacer clinic and for MD follow up :    7/26/2017 9:40 AM Sunil Ortega MD [facility] [phone] [address]   7/26/2017 9:40 AM Sunil Ortega MD [facility] Riggins RN , [facility], [facility]   [address]  [phone]

## 2017-07-26 ENCOUNTER — CLINICAL SUPPORT (OUTPATIENT)
Dept: CARDIOLOGY CLINIC | Age: 67
End: 2017-07-26

## 2017-07-26 ENCOUNTER — OFFICE VISIT (OUTPATIENT)
Dept: CARDIOLOGY CLINIC | Age: 67
End: 2017-07-26

## 2017-07-26 VITALS
WEIGHT: 225.6 LBS | SYSTOLIC BLOOD PRESSURE: 140 MMHG | BODY MASS INDEX: 30.56 KG/M2 | HEART RATE: 60 BPM | DIASTOLIC BLOOD PRESSURE: 80 MMHG | HEIGHT: 72 IN | OXYGEN SATURATION: 98 % | RESPIRATION RATE: 16 BRPM

## 2017-07-26 DIAGNOSIS — I45.9 HEART BLOCK: Primary | ICD-10-CM

## 2017-07-26 DIAGNOSIS — Z95.0 CARDIAC PACEMAKER IN SITU: Primary | ICD-10-CM

## 2017-07-26 RX ORDER — AMLODIPINE BESYLATE 2.5 MG/1
TABLET ORAL EVERY EVENING
COMMUNITY
End: 2018-03-21 | Stop reason: SDUPTHER

## 2017-07-26 NOTE — PROGRESS NOTES
HISTORY OF PRESENTING ILLNESS      Merritt Lazo is a 69 y.o. male with hypercholesteremia, hypertension and recent CVA with recent ILR injection for cryptogenic CVA however was found to have up to 6 second pauses on his ILR and thus underwent ILR removal followed by dual chamber PPM implantation. His site has healed well. He denies chest pain, SOB. Device interrogation reveals normal functioning with 70% atrial pacing.         ACTIVE PROBLEM LIST     Patient Active Problem List    Diagnosis Date Noted    Heart block 06/20/2017    Acute CVA (cerebrovascular accident) ([facility] 03/26/2017    High cholesterol 03/25/2017    HTN (hypertension) 03/25/2017    Obesity (BMI 30-39.9) 03/25/2017           PAST MEDICAL HISTORY     Past Medical History:   Diagnosis Date    Hypercholesteremia     Hypertension     Migraines     Obesity (BMI 30-39.9) 3/25/2017    Stroke ([facility]            PAST SURGICAL HISTORY     Past Surgical History:   Procedure Laterality Date    HX ORTHOPAEDIC      knees          ALLERGIES     Allergies   Allergen Reactions    Erythromycin Hives     Pt states allergic to all mycins     Lipitor [Atorvastatin] Rash    Macrolide Antibiotics Hives    Niacin Rash    Other Medication Hives     All mycin medication groups/family           FAMILY HISTORY     Family History   Problem Relation Age of Onset    Stroke Father    [facility] Migra[facility] Sister     Hypertension Neg Hx     Heart Disease Neg Hx     negative for cardiac disease       SOCIAL HISTORY     Social History     Social History    Marital status:      Spouse name: N/A    Number of children: N/A    Years of education: N/A     Social History Main Topics    Smoking status: Former Smoker     Quit date: 1977    Smokeless tobacco: Never Used    Alcohol use 1.2 oz/week     2 Shots of liquor per week    Drug use: No    Sexual activity: Yes     Other Topics Concern    Not on file     Social History Narrative         MEDICATIONS Current Outpatient Prescriptions   Medication Sig    atenolol (TENORMIN) 50 mg tablet TAKE 1 TABLET EVERY DAY    naproxen sodium (ALEVE) 220 mg tablet Take 220 mg by mouth daily as needed.  loratadine (CLARITIN) 10 mg tablet Take 10 mg by mouth.  pravastatin (PRAVACHOL) 20 mg tablet Take 20 mg by mouth nightly.  aspirin 81 mg chewable tablet Take 1 Tab by mouth daily. Indications: Acute Coronary Syndrome     No current facility-administered medications for this visit. I have reviewed the nurses notes, vitals, problem list, allergy list, medical history, family, social history and medications. REVIEW OF SYMPTOMS      General: Pt denies excessive weight gain or loss. Pt is able to conduct ADL's  HEENT: Denies blurred vision, headaches, hearing loss, epistaxis and difficulty swallowing. Respiratory: Denies cough, congestion, shortness of breath, SOLANO, wheezing or stridor. Cardiovascular: Denies precordial pain, palpitations, edema or PND  Gastrointestinal: Denies poor appetite, indigestion, abdominal pain or blood in stool  Genitourinary: Denies hematuria, dysuria, increased urinary frequency  Musculoskeletal: Denies joint pain or swelling from muscles or joints  Neurologic: Denies tremor, paresthesias, headache, or sensory motor disturbance  Psychiatric: Denies confusion, insomnia, depression  Integumentray: Denies rash, itching or ulcers. Hematologic: Denies easy bruising, bleeding     PHYSICAL EXAMINATION      There were no vitals filed for this visit. General: Well developed, in no acute distress. HEENT: No jaundice, oral mucosa moist, no oral ulcers  Neck: Supple, no stiffness, no lymphadenopathy, supple  Heart:  Normal S1/S2 negative S3 or S4.  Regular, no murmur, gallop or rub, no jugular venous distention  Respiratory: Clear bilaterally x 4, no wheezing or rales  Abdomen:   Soft, non-tender, bowel sounds are active.   Extremities:  No edema, normal cap refill, no cyanosis. Musculoskeletal: No clubbing, no deformities  Neuro: A&Ox3, speech clear, gait stable, cooperative, no focal neurologic deficits  Skin: Skin color is normal. No rashes or lesions. Non diaphoretic, moist.  Vascular: 2+ pulses symmetric in all extremities       DIAGNOSTIC DATA      EKG:        LABORATORY DATA      Lab Results   Component Value Date/Time    WBC 7.8 06/23/2017 01:07 PM    HGB 17.7 06/23/2017 01:07 PM    HCT 51.8 06/23/2017 01:07 PM    PLATELET 837 47/97/0789 01:07 PM    MCV 91.8 06/23/2017 01:07 PM      Lab Results   Component Value Date/Time    Sodium 141 06/23/2017 01:07 PM    Potassium 4.3 06/23/2017 01:07 PM    Chloride 103 06/23/2017 01:07 PM    CO2 31 06/23/2017 01:07 PM    Anion gap 7 06/23/2017 01:07 PM    Glucose 93 06/23/2017 01:07 PM    BUN 17 06/23/2017 01:07 PM    Creatinine 1.14 06/23/2017 01:07 PM    BUN/Creatinine ratio 15 06/23/2017 01:07 PM    GFR est AA >60 06/23/2017 01:07 PM    GFR est non-AA >60 06/23/2017 01:07 PM    Calcium 9.2 06/23/2017 01:07 PM    Bilirubin, total 0.5 03/25/2017 10:13 AM    AST (SGOT) 18 03/25/2017 10:13 AM    Alk. phosphatase 113 03/25/2017 10:13 AM    Protein, total 8.4 03/25/2017 10:13 AM    Albumin 3.9 03/25/2017 10:13 AM    Globulin 4.5 03/25/2017 10:13 AM    A-G Ratio 0.9 03/25/2017 10:13 AM    ALT (SGPT) 21 03/25/2017 10:13 AM           ASSESSMENT      1. Bradycardia/SSS  2. CVA  3. Hypertension  4. Hyperlipidemia   5. PPM   A. Dual   B. [employer]       PLAN     Patient is doing well. Continue follow up in device clinic. FOLLOW-UP     1 year      Thank you,  Nella Kovacs MD and Dr. Annamaria Cunha for involving me in the care of this extraordinarily pleasant male. Please do not hesitate to contact me for further questions/concerns.          Mari Bacon MD  Cardiac Electrophysiology / Cardiology    [facility]. [address] [facility], [address] [place] [address] [address], [address] [address],[address]  [place], [address] [place] [address] Drive    Erwin Harrell  [phone] / [phone] Fax   [phone] / [phone] Fax

## 2017-11-05 ENCOUNTER — HOSPITAL ENCOUNTER (OUTPATIENT)
Dept: CT IMAGING | Age: 67
Discharge: HOME | End: 2017-11-05
Attending: ORTHOPAEDIC SURGERY
Payer: MEDICARE

## 2017-11-05 DIAGNOSIS — M54.10 RADICULAR SYNDROME OF LOWER LIMBS: ICD-10-CM

## 2017-11-05 DIAGNOSIS — M50.30 DDD (DEGENERATIVE DISC DISEASE), CERVICAL: ICD-10-CM

## 2017-11-05 DIAGNOSIS — M47.812 FACET ARTHROPATHY, CERVICAL: ICD-10-CM

## 2017-11-05 PROCEDURE — 72125 CT NECK SPINE W/O DYE: CPT

## 2017-11-06 ENCOUNTER — CLINICAL SUPPORT (OUTPATIENT)
Dept: CARDIOLOGY CLINIC | Age: 67
End: 2017-11-06

## 2017-11-06 DIAGNOSIS — Z95.0 CARDIAC PACEMAKER IN SITU: Primary | ICD-10-CM

## 2017-12-04 ENCOUNTER — TELEPHONE (OUTPATIENT)
Dept: CARDIOLOGY CLINIC | Age: 67
End: 2017-12-04

## 2017-12-04 NOTE — TELEPHONE ENCOUNTER
Pt needs to r/s his 2/6/18 remote check. Pt can be reached at [phone].     Thank you,  Rodríguez Ortez

## 2017-12-27 ENCOUNTER — TELEPHONE (OUTPATIENT)
Dept: OTHER | Age: 67
End: 2017-12-27

## 2017-12-27 NOTE — TELEPHONE ENCOUNTER
Pt traveling & wanted to make sure ok to go & r/s the remote since that is when he is gone. Informed pt he is fine traveling & to just make sure he shows his pacemaker card at security. Changed remote to 2/27/18.

## 2017-12-27 NOTE — TELEPHONE ENCOUNTER
Patient has scheduled a trip out of the country. Patient would like to know if it is okay for him to travel. Patient can be reached at [phone]. Thanks !

## 2018-02-27 ENCOUNTER — OFFICE VISIT (OUTPATIENT)
Dept: CARDIOLOGY CLINIC | Age: 68
End: 2018-02-27

## 2018-02-27 DIAGNOSIS — Z95.0 CARDIAC PACEMAKER IN SITU: Primary | ICD-10-CM

## 2018-03-05 ENCOUNTER — OFFICE VISIT (OUTPATIENT)
Dept: NEUROLOGY | Age: 68
End: 2018-03-05

## 2018-03-05 VITALS
BODY MASS INDEX: 30.66 KG/M2 | SYSTOLIC BLOOD PRESSURE: 110 MMHG | WEIGHT: 226.1 LBS | DIASTOLIC BLOOD PRESSURE: 58 MMHG | HEART RATE: 95 BPM | OXYGEN SATURATION: 59 %

## 2018-03-05 DIAGNOSIS — R29.810 FACIAL DROOP: ICD-10-CM

## 2018-03-05 DIAGNOSIS — I63.9 ACUTE CVA (CEREBROVASCULAR ACCIDENT) (HCC): Primary | ICD-10-CM

## 2018-03-05 RX ORDER — CLOPIDOGREL BISULFATE 75 MG/1
75 TABLET ORAL DAILY
Qty: 30 TAB | Refills: 5 | Status: SHIPPED | OUTPATIENT
Start: 2018-03-05 | End: 2020-03-17 | Stop reason: SDUPTHER

## 2018-03-05 RX ORDER — CLOPIDOGREL BISULFATE 75 MG/1
75 TABLET ORAL DAILY
Refills: 0 | COMMUNITY
Start: 2018-02-22 | End: 2018-03-05 | Stop reason: SDUPTHER

## 2018-03-05 NOTE — MR AVS SNAPSHOT
[address] [facility] [address] [facility] [address] [address] [phone] Patient: Anna Acuna MRN: [id number] Visit Information Date & Time Provider Department Dept. Phone Encounter #  
 3/5/2018  2:00 PM CHARY Dickson Neurology [facility] [phone] [id number] Your Appointments 8/20/2018  8:30 AM  
PACEMAKER with PACEMAKERLA  
[facility] ([place] SCHEDULING) Appt Note: med/thres/stf/CL nick  
 [address] [facility] [address] [place] [address]  
[phone]  
  
   
 [address] [place] [address]  
  
    
 8/20/2018  9:00 AM  
ESTABLISHED PATIENT with Adis Jennings MD  
[facility] ([address]) Appt Note: annual  
 [address] [address]  
[phone]  
  
   
 [address] [facility] [address] [address] [address]  
  
    
  
 5/22/2018 11:45 AM  
REMOTE OFFICE VISIT with Aranza Angel [facility] ([place] SCHEDULING) Appt Note: cl/pm/stf; cl/pm/stf  
 [address] [place] [address]  
[address] [facility] [address] [address] [address] Upcoming Health Maintenance Date Due Hepatitis C Screening 1950 DTaP/Tdap/Td series (1 - Tdap) 7/13/1969 FOBT Q 1 YEAR AGE 50-75 7/13/1998 ZOSTER VACCINE AGE 60> 5/13/2008 GLAUCOMA SCREENING Q2Y 7/13/2013 Pneumococcal 65+ Low/Medium Risk (1 of 2 - PCV13) 7/13/2013 MEDICARE YEARLY EXAM 7/13/2013 Influenza Age 5 to Adult 8/1/2017 Allergies as of 3/5/2018  Review Complete On: 11/5/2017 By: Gio Free, RT, R Severity Noted Reaction Type Reactions Erythromycin  06/23/2017    Hives Pt states allergic to all mycins Lipitor [Atorvastatin]  04/10/2017    Rash Macrolide Antibiotics  03/27/2017    Hives Niacin  04/10/2017    Rash Other Medication  03/25/2017    Hives All mycin medication groups/family Current Immunizations  Never Reviewed No immunizations on file. Not reviewed this visit You Were Diagnosed With   
  
 Codes Comments Acute CVA (cerebrovascular accident) ([facility]    -  Primary ICD-10-CM: I63.9 ICD-9-CM: 434.91 Facial droop     ICD-10-CM: R29.810 ICD-9-CM: 781.94 Vitals BP Pulse Weight(growth percentile) SpO2 BMI Smoking Status 110/58 95 226 lb 1.6 oz (102.6 kg) (!) 59% 30.66 kg/m2 Former Smoker BMI and BSA Data Body Mass Index Body Surface Area  
 30.66 kg/m 2 2.28 m 2 Preferred Pharmacy Pharmacy Name Phone [id number] [address] [facility], [address] [phone] Your Updated Medication List  
  
   
This list is accurate as of 3/5/18  3:00 PM.  Always use your most recent med list.  
  
  
  
  
 aspirin 81 mg chewable tablet Take 1 Tab by mouth daily. Indications: Acute Coronary Syndrome  
  
 atenolol 50 mg tablet Commonly known as:  TENORMIN  
TAKE 1 TABLET EVERY DAY  
  
 clopidogrel 75 mg Tab Commonly known as:  PLAVIX Take 1 Tab by mouth daily. NORVASC 2.5 mg tablet Generic drug:  amLODIPine Take  by mouth every evening. PRAVACHOL 20 mg tablet Generic drug:  pravastatin Take 20 mg by mouth nightly. Prescriptions Sent to Pharmacy Refills  
 clopidogrel (PLAVIX) 75 mg tab 5 Sig: Take 1 Tab by mouth daily. Class: Normal  
 Pharmacy: [address] [place] [address], [address] [place] [address] Road  #: [phone] Route: Oral  
  
We Performed the Following REFERRAL TO SPEECH THERAPY [TMT964 Custom] Comments:  
 Evaluate and treat post stroke To-Do List   
 03/05/2018 Imaging:  MRA BRAIN W WO CONT   
  
 03/05/2018 Imaging:  MRI BRAIN W WO CONT Referral Information Referral ID Referred By Referred To  
  
 [id number] Jeanne Meyer Not Available Visits Status Start Date End Date 1 New Request 3/5/18 3/5/19 If your referral has a status of pending review or denied, additional information will be sent to support the outcome of this decision. Introducing [facility]! Dear Benton Lieberman: Thank you for requesting a [facility] account. Our records indicate that you already have an active [facility] account. You can access your account anytime at [email or web address] Did you know that you can access your hospital and ER discharge instructions at any time in [facility]? You can also review all of your test results from your hospital stay or ER visit. Additional Information If you have questions, please visit the Frequently Asked Questions section of the [facility] website at [email or web address] Remember, [facility] is NOT to be used for urgent needs. For medical emergencies, dial 911. Now available from your iPhone and Android! Please provide this summary of care documentation to your next provider. Your primary care clinician is listed as Mery Hopper. If you have any questions after today's visit, please call [phone].

## 2018-03-05 NOTE — PROGRESS NOTES
Tiffanie Santiago is a 69 y.o. male who presents with the following  No chief complaint on file. HPI Patient comes in with wife for a follow up for acute stroke. [place] and woke up and was shaving and talking to his wife while doing this and he began repeating himself a lot and she saw him in the mirror and noticed a right side facial droop. Could not see his teeth, he also had some slurred speech. He has not noticed any other symptoms with this. No visual concerns, weakness, numbness, tingling, or pain. Just the facial droop and slurred speech. They then drove to the ER and he had a multitude of testing which we will get the records from to evaluate further. He has a hx of heart block and a pacemaker. Has had a previous hx of a stroke last April 2017. He has had an MRI per his report with his pacemaker in place. Did not do an MRI in [place]. Saw OT and PT and speech in the hospital in [place]. Only concerns right now are slurred speech, facial droop, and sometimes has trouble swallowing or things getting stuck in his throat. He had Plavix 75 mg added to his treatment plan in the hospital. Also on aspirin 81 mg and pravastatin 20 mg. No other acute concerns. To see Dr. Severo Rosser next few weeks. Did not get TPA     Allergies   Allergen Reactions    Erythromycin Hives     Pt states allergic to all mycins     Lipitor [Atorvastatin] Rash    Macrolide Antibiotics Hives    Niacin Rash    Other Medication Hives     All mycin medication groups/family        Current Outpatient Prescriptions   Medication Sig    clopidogrel (PLAVIX) 75 mg tab Take 1 Tab by mouth daily.  amLODIPine (NORVASC) 2.5 mg tablet Take  by mouth every evening.  atenolol (TENORMIN) 50 mg tablet TAKE 1 TABLET EVERY DAY    pravastatin (PRAVACHOL) 20 mg tablet Take 20 mg by mouth nightly.  aspirin 81 mg chewable tablet Take 1 Tab by mouth daily.  Indications: Acute Coronary Syndrome     No current facility-administered medications for this visit. History   Smoking Status    Former Smoker    Quit date: 1977   Smokeless Tobacco    Never Used       Past Medical History:   Diagnosis Date    Hypercholesteremia     Hypertension     Migraines     Obesity (BMI 30-39.9) 3/25/2017    Stroke [facility])        Past Surgical History:   Procedure Laterality Date    HX ORTHOPAEDIC      knees       Family History   Problem Relation Age of Onset    Stroke Father    [facility] Migraines Sister     Hypertension Neg Hx     Heart Disease Neg Hx        Social History     Social History    Marital status:      Spouse name: N/A    Number of children: N/A    Years of education: N/A     Social History Main Topics    Smoking status: Former Smoker     Quit date: 1977    Smokeless tobacco: Never Used    Alcohol use 1.2 oz/week     2 Shots of liquor per week    Drug use: No    Sexual activity: Yes     Other Topics Concern    Not on file     Social History Narrative       Review of Systems   Constitutional: Negative for malaise/fatigue. Eyes: Negative for blurred vision, double vision and photophobia. Respiratory: Negative for shortness of breath and wheezing. Cardiovascular: Negative for chest pain and palpitations. Gastrointestinal: Negative for vomiting. Neurological: Positive for speech change. Negative for dizziness, tingling, seizures, loss of consciousness, weakness and headaches. Remainder of comprehensive review is negative. Physical Exam :    Visit Vitals    /58    Pulse 95    Wt 102.6 kg (226 lb 1.6 oz)    SpO2 (!) 59%    BMI 30.66 kg/m2       General: Well defined, nourished, and groomed individual in no acute distress.    Neck: Supple, nontender, no bruits, no pain with resistance to active range of motion.    Heart: Regular rate and rhythm, no murmurs, rub, or gallop. Normal S1S2.   Lungs: Clear to auscultation bilaterally with equal chest expansion, no cough, no wheeze  Musculoskeletal: Extremities revealed no edema and had full range of motion of joints.    Psych: Good mood and bright affect    NEUROLOGICAL EXAMINATION:    Mental Status: Alert and oriented to person, place, and time    Cranial Nerves:    II, III, IV, VI: Visual acuity grossly intact. Visual fields are normal.    Pupils are equal, round, and reactive to light and accommodation.    Extra-ocular movements are full and fluid. Fundoscopic exam was benign, no ptosis or nystagmus.    V-XII: Hearing is grossly  . Right sided facial droop     Motor Examination: Normal tone, bulk, and strength, 5/5 muscle strength throughout. Coordination: Finger to nose was normal. No resting or intention tremor    Gait and Station: Steady while walking. Normal arm swing. No pronator drift. No muscle wasting or fasiculations noted. Reflexes: DTRs 2+ throughout. Results for orders placed or performed during the hospital encounter of 06/23/17   CBC WITH AUTOMATED DIFF   Result Value Ref Range    WBC 7.8 4.1 - 11.1 K/uL    RBC 5.64 4.10 - 5.70 M/uL    HGB 17.7 (H) 12.1 - 17.0 g/dL    HCT 51.8 (H) 36.6 - 50.3 %    MCV 91.8 80.0 - 99.0 FL    MCH 31.4 26.0 - 34.0 PG    MCHC 34.2 30.0 - 36.5 g/dL    RDW 13.4 11.5 - 14.5 %    PLATELET 660 146 - 547 K/uL    NEUTROPHILS 71 32 - 75 %    LYMPHOCYTES 18 12 - 49 %    MONOCYTES 9 5 - 13 %    EOSINOPHILS 2 0 - 7 %    BASOPHILS 0 0 - 1 %    ABS. NEUTROPHILS 5.5 1.8 - 8.0 K/UL    ABS. LYMPHOCYTES 1.4 0.8 - 3.5 K/UL    ABS. MONOCYTES 0.7 0.0 - 1.0 K/UL    ABS. EOSINOPHILS 0.2 0.0 - 0.4 K/UL    ABS.  BASOPHILS 0.0 0.0 - 0.1 K/UL   METABOLIC PANEL, BASIC   Result Value Ref Range    Sodium 141 136 - 145 mmol/L    Potassium 4.3 3.5 - 5.1 mmol/L    Chloride 103 97 - 108 mmol/L    CO2 31 21 - 32 mmol/L    Anion gap 7 5 - 15 mmol/L    Glucose 93 65 - 100 mg/dL    BUN 17 6 - 20 MG/DL    Creatinine 1.14 0.70 - 1.30 MG/DL    BUN/Creatinine ratio 15 12 - 20      GFR est AA >60 >60 ml/min/1.73m2    GFR est non-AA >60 >60 ml/min/1.73m2    Calcium 9.2 8.5 - 10.1 MG/DL       Orders Placed This Encounter    MRI BRAIN W WO CONT     Standing Status:   Future     Standing Expiration Date:   4/5/2019     Order Specific Question:   Is Patient Allergic to Contrast Dye? Answer:   No     Order Specific Question:   STAT Creatinine as indicated     Answer: Yes    MRA BRAIN W WO CONT     Standing Status:   Future     Standing Expiration Date:   4/5/2019     Order Specific Question:   Is Patient Allergic to Contrast Dye? Answer:   No     Order Specific Question:   STAT Creatinine as indicated     Answer: Yes    REFERRAL TO SPEECH THERAPY     Referral Priority:   Routine     Referral Type:   PT/OT/ST     Referral Reason:   Specialty Services Required    DISCONTD: clopidogrel (PLAVIX) 75 mg tab     Sig: Take 75 mg by mouth daily. Refill:  0    clopidogrel (PLAVIX) 75 mg tab     Sig: Take 1 Tab by mouth daily. Dispense:  30 Tab     Refill:  5       1. Acute CVA (cerebrovascular accident) ([address]    2. Facial droop        Follow-up Disposition: Not on File    Acute stroke with facial droop and slurred speech. We need an MRI and MRA To look at specifically where in the brain this stroke has occurred. He will need the MRA also to look for blockage, stenosis that puts him at at higher risk. In regards to his face and speech we will send to speech therapy to see if they can further help with his recurring symptoms. We will keep Plavix, Aspirin, and Pravastatin for now. Keep Pravastatin at 20 mg for now. Keep LDL below 70 and pending results from [place] we will titrate accordingly. He also understands the s/s of a stroke and when to call 911. To see Dr. Rosina Worrell soon to evaluate his heart as cause.          This note will not be viewable in [employer]

## 2018-03-09 ENCOUNTER — TELEPHONE (OUTPATIENT)
Dept: NEUROLOGY | Age: 68
End: 2018-03-09

## 2018-03-09 ENCOUNTER — TELEPHONE (OUTPATIENT)
Dept: CARDIOLOGY CLINIC | Age: 68
End: 2018-03-09

## 2018-03-09 NOTE — TELEPHONE ENCOUNTER
----- Message from [facility] sent at 3/9/2018 10:54 AM EST -----  Regarding: CHARY Platt/ telephone  The pt just missed a call from someone, and he wasn't sure of who it was or what was needed. Best contact number is [phone].

## 2018-03-09 NOTE — TELEPHONE ENCOUNTER
L/m returning call informing Brit to fax me the MRI form she needs filled out by Dr Bobby Beauchamp on [facility].

## 2018-03-09 NOTE — TELEPHONE ENCOUNTER
----- Message from [facility] sent at 3/9/2018 12:15 PM EST -----  Regarding: CHARY Tsai/Telephone  Pt [phone] is returning a call he recvd from the practice.

## 2018-03-09 NOTE — TELEPHONE ENCOUNTER
Marshall Byrne from [address] [place] [address] calling to have a form filled out by Dr. Laurie Cruz in regards to the pt's monitor. Marshall Byrne states that [employer]tronic needs to know how to reset it after an MRI. She said its urgent. Please give her a call back @ [phone] after 2 pm today. Thanks!   Joann Mullins

## 2018-03-19 ENCOUNTER — TELEPHONE (OUTPATIENT)
Dept: CARDIOLOGY CLINIC | Age: 68
End: 2018-03-19

## 2018-03-19 NOTE — TELEPHONE ENCOUNTER
Spoke with patient's spouse. Stated Mr. Acosta's blood  pressure has been rising over the past few weeks. Stated Mr. Acosta has been increasingly tired but does not report any headache. Past pressures were in the 130/70 range. Recent blood pressures have been between 155-165/88-90. States patient takes his amlodipine, atenlolol, and plavix every day at 8am.  Blood pressure measured at 10:30am.  Takes cholesterol medication qpm.  Please advise.

## 2018-03-19 NOTE — TELEPHONE ENCOUNTER
pts wife needs to speak to the nurse about the pts blood pressure.      Please call pt back on home number [phone]    Thanks

## 2018-03-20 NOTE — TELEPHONE ENCOUNTER
Spoke with patient's spouse. Per ISIDRO Rios, patient will increase amlodipine to 5mg daily. Will continue to monitor bp and call with update in 1 week.

## 2018-03-21 DIAGNOSIS — I45.9 HEART BLOCK: ICD-10-CM

## 2018-03-21 RX ORDER — AMLODIPINE BESYLATE 5 MG/1
5 TABLET ORAL EVERY EVENING
Qty: 30 TAB | Refills: 6
Start: 2018-03-21 | End: 2018-04-20 | Stop reason: DRUGHIGH

## 2018-03-22 ENCOUNTER — HOSPITAL ENCOUNTER (OUTPATIENT)
Dept: MRI IMAGING | Age: 68
Discharge: HOME | End: 2018-03-22
Attending: NURSE PRACTITIONER
Payer: MEDICARE

## 2018-03-22 DIAGNOSIS — R29.810 FACIAL DROOP: ICD-10-CM

## 2018-03-22 DIAGNOSIS — I63.9 ACUTE CVA (CEREBROVASCULAR ACCIDENT) (HCC): ICD-10-CM

## 2018-03-22 LAB — CREAT BLD-MCNC: 1.1 MG/DL (ref 0.6–1.3)

## 2018-03-22 PROCEDURE — A9576 INJ PROHANCE MULTIPACK: HCPCS | Performed by: RADIOLOGY

## 2018-03-22 PROCEDURE — 82565 ASSAY OF CREATININE: CPT

## 2018-03-22 PROCEDURE — 70553 MRI BRAIN STEM W/O & W/DYE: CPT

## 2018-03-22 PROCEDURE — 74011250636 HC RX REV CODE- 250/636: Performed by: RADIOLOGY

## 2018-03-22 PROCEDURE — 70544 MR ANGIOGRAPHY HEAD W/O DYE: CPT

## 2018-03-22 RX ADMIN — GADOTERIDOL 20 ML: 279.3 INJECTION, SOLUTION INTRAVENOUS at 12:11

## 2018-03-23 ENCOUNTER — TELEPHONE (OUTPATIENT)
Dept: NEUROLOGY | Age: 68
End: 2018-03-23

## 2018-03-23 DIAGNOSIS — I63.9 ACUTE CVA (CEREBROVASCULAR ACCIDENT) (HCC): Primary | ICD-10-CM

## 2018-03-23 DIAGNOSIS — E78.00 HIGH CHOLESTEROL: Chronic | ICD-10-CM

## 2018-03-23 DIAGNOSIS — R90.89 ABNORMAL BRAIN MRI: ICD-10-CM

## 2018-03-23 DIAGNOSIS — I10 ESSENTIAL HYPERTENSION: Chronic | ICD-10-CM

## 2018-03-23 NOTE — TELEPHONE ENCOUNTER
Patient informed and will wait for call from patient care team. Also informed patient that the order will be mailed to him along with number for patient care team in case he does not hear from them.

## 2018-03-23 NOTE — TELEPHONE ENCOUNTER
----- Message from Meeta العلي NP sent at 3/23/2018  8:59 AM EDT -----  Can you call him and let him know we need to get a CTA of the head AND neck to look at his blood vessels and vasculature of the brain better.  Radiology needs more imaging to look at the blood to the brain and neck thanks

## 2018-03-28 ENCOUNTER — TELEPHONE (OUTPATIENT)
Dept: NEUROLOGY | Age: 68
End: 2018-03-28

## 2018-03-28 DIAGNOSIS — R09.81 CONGESTION OF PARANASAL SINUS: Primary | ICD-10-CM

## 2018-03-28 NOTE — TELEPHONE ENCOUNTER
Attempted to contact patient.  Let message with number to  Patient care team.    Patient Care Team: [phone]

## 2018-03-28 NOTE — TELEPHONE ENCOUNTER
----- Message from [facility] sent at 3/28/2018  8:50 AM EDT -----  Regarding: CHARY Platt/ telephone  The pt was told he needs a CT of his neck and head, but hasn't received a call about scheduling the appt. Best contact number is [phone].

## 2018-03-30 ENCOUNTER — HOSPITAL ENCOUNTER (OUTPATIENT)
Dept: CT IMAGING | Age: 68
Discharge: HOME | End: 2018-03-30
Attending: NURSE PRACTITIONER
Payer: MEDICARE

## 2018-03-30 DIAGNOSIS — I63.9 ACUTE CVA (CEREBROVASCULAR ACCIDENT) (HCC): ICD-10-CM

## 2018-03-30 DIAGNOSIS — E78.00 HIGH CHOLESTEROL: Chronic | ICD-10-CM

## 2018-03-30 DIAGNOSIS — I10 ESSENTIAL HYPERTENSION: Chronic | ICD-10-CM

## 2018-03-30 DIAGNOSIS — R90.89 ABNORMAL BRAIN MRI: ICD-10-CM

## 2018-03-30 PROCEDURE — 70498 CT ANGIOGRAPHY NECK: CPT

## 2018-03-30 PROCEDURE — 74011636320 HC RX REV CODE- 636/320: Performed by: RADIOLOGY

## 2018-03-30 RX ADMIN — IOPAMIDOL 100 ML: 755 INJECTION, SOLUTION INTRAVENOUS at 14:00

## 2018-04-02 ENCOUNTER — TELEPHONE (OUTPATIENT)
Dept: NEUROLOGY | Age: 68
End: 2018-04-02

## 2018-04-02 NOTE — TELEPHONE ENCOUNTER
Attempted to contact patient. Left message on voicemail for return call. Per NP:  No ENT right now. Still waiting for images and report of his CTA should be back soon. Depending on what we see on this will guide us to what to do from here.          Thanks (Routing comment)

## 2018-04-02 NOTE — TELEPHONE ENCOUNTER
----- Message from Nabeel Marcano sent at 4/2/2018  1:02 PM EDT -----  Regarding: NP O\"Murphy/Telephone  Pt inquiring if he needs to an ENT specialist after having a stroke. Pt has already gotten a CT of head and neck done at [address] [place] [address].  Best contact number [phone]

## 2018-04-03 NOTE — TELEPHONE ENCOUNTER
----- Message from [facility] [place] sent at 4/3/2018 10:34 AM EDT -----  Regarding: Dr. Britt Ellison  Pt would like to know if Dr. Kar Sparrow can recommend an ears, nose, and throat specialist. Pt can be reached at [phone].

## 2018-04-04 DIAGNOSIS — I63.9 ACUTE CVA (CEREBROVASCULAR ACCIDENT) (HCC): Primary | ICD-10-CM

## 2018-04-04 NOTE — TELEPHONE ENCOUNTER
Called and spoke to patient. Informed him of NP's note. He states understanding and will wait to hear from us.

## 2018-04-05 NOTE — TELEPHONE ENCOUNTER
Attempted to contact patient. Left message on voicemail for return call. Per NP:  Refer to Dr. Goodwin, neurovascular surgeyr to evaluate his brain vasculature further when talking to Dr. Scar Cano stenosis in a few blood vessels in his brain that could be causing stroke risk to be higher.  Can be evaluated for further treatment thanks (Routing comment)

## 2018-04-10 NOTE — TELEPHONE ENCOUNTER
Called and spoke to patient. He would like a copy of the referral sent to his address. Address verified.

## 2018-04-20 ENCOUNTER — OFFICE VISIT (OUTPATIENT)
Dept: NEUROSURGERY | Age: 68
End: 2018-04-20

## 2018-04-20 VITALS
TEMPERATURE: 98.1 F | BODY MASS INDEX: 31.78 KG/M2 | WEIGHT: 227 LBS | SYSTOLIC BLOOD PRESSURE: 166 MMHG | HEIGHT: 71 IN | DIASTOLIC BLOOD PRESSURE: 108 MMHG | HEART RATE: 59 BPM

## 2018-04-20 DIAGNOSIS — I67.2 INTRACRANIAL ATHEROSCLEROSIS: Primary | ICD-10-CM

## 2018-04-20 RX ORDER — PRAVASTATIN SODIUM 80 MG/1
80 TABLET ORAL DAILY
COMMUNITY
Start: 2018-04-19 | End: 2020-03-17 | Stop reason: SDUPTHER

## 2018-04-20 RX ORDER — AMLODIPINE BESYLATE 2.5 MG/1
2.5 TABLET ORAL DAILY
COMMUNITY
Start: 2018-03-24 | End: 2019-09-11

## 2018-04-20 NOTE — MR AVS SNAPSHOT
[address] [address] [address] 
[phone] Patient: Amaury Number MRN: [id number] Visit Information Date & Time Provider Department Dept. Phone Encounter #  
 4/20/2018  2:00 PM Harjinder Ruffin [facility] [phone] [id number] Follow-up Instructions Return if symptoms worsen or fail to improve. Your Appointments 8/31/2018  9:15 AM  
PACEMAKER with PACEMAKERSTFRANCES  
[facility] ([place] SCHEDULING) Appt Note: 4/11/18 spoke to pt of appt time and day change from 8/20/18  sll [address]  
[address] [address]  
  
    
 8/31/2018  9:20 AM  
ESTABLISHED PATIENT with Vidal Isaac MD  
[facility] ([address]) Appt Note: med/thres/stf/CL nick; 4/11/18 spoke to pt of appt time and day change from 8/20/18  [facility] [address] [place] [address]  
[phone]  
  
   
 [address] [address] [address]  
  
    
  
 5/22/2018 11:45 AM  
REMOTE OFFICE VISIT with [facility] ([place] SCHEDULING) Appt Note: cl/pm/stf; cl/pm/stf  
 [address]  
[address] [facility] [address] [address] [address] Upcoming Health Maintenance Date Due Hepatitis C Screening 1950 DTaP/Tdap/Td series (1 - Tdap) 7/13/1969 FOBT Q 1 YEAR AGE 50-75 7/13/1998 ZOSTER VACCINE AGE 60> 5/13/2008 GLAUCOMA SCREENING Q2Y 7/13/2013 Pneumococcal 65+ Low/Medium Risk (1 of 2 - PCV13) 7/13/2013 Influenza Age 5 to Adult 8/1/2017 MEDICARE YEARLY EXAM 3/14/2018 Allergies as of 4/20/2018  Review Complete On: 4/20/2018 By: Marcelo Talbot CNA Severity Noted Reaction Type Reactions Erythromycin  06/23/2017    Hives Pt states allergic to all mycins Lipitor [Atorvastatin]  04/10/2017    Rash Macrolide Antibiotics  03/27/2017    Hives Niacin  04/10/2017    Rash Other Medication  03/25/2017    Hives All mycin medication groups/family Current Immunizations  Never Reviewed No immunizations on file. Not reviewed this visit Vitals BP Pulse Temp Height(growth percentile) Weight(growth percentile) BMI  
 (!) 166/108 (BP 1 Location: Left arm, BP Patient Position: Sitting) (!) 59 98.1 °F (36.7 °C) (Oral) 5' 11\" (1.803 m) 227 lb (103 kg) 31.66 kg/m2 Smoking Status Former Smoker Vitals History BMI and BSA Data Body Mass Index Body Surface Area  
 31.66 kg/m 2 2.27 m 2 Preferred Pharmacy Pharmacy Name Phone [id number] [facility], [address] [phone] Your Updated Medication List  
  
   
This list is accurate as of 4/20/18  2:17 PM.  Always use your most recent med list. amLODIPine 5 mg tablet Commonly known as:  Jessee Lute Take 1 Tab by mouth every evening. aspirin 81 mg chewable tablet Take 1 Tab by mouth daily. Indications: Acute Coronary Syndrome  
  
 atenolol 50 mg tablet Commonly known as:  TENORMIN  
TAKE 1 TABLET EVERY DAY  
  
 clopidogrel 75 mg Tab Commonly known as:  PLAVIX Take 1 Tab by mouth daily. PRAVACHOL 20 mg tablet Generic drug:  pravastatin Take 20 mg by mouth nightly. Follow-up Instructions Return if symptoms worsen or fail to improve. Introducing [facility]! Dear Og Corona: Thank you for requesting a [employer] account. Our records indicate that you already have an active [employer] account. You can access your account anytime at [email or web address] Did you know that you can access your hospital and ER discharge instructions at any time in [employer]? You can also review all of your test results from your hospital stay or ER visit. Additional Information If you have questions, please visit the Frequently Asked Questions section of the [employer] website at [email or web address] Remember, [employer] is NOT to be used for urgent needs. For medical emergencies, dial 911. Now available from your iPhone and Android! Please provide this summary of care documentation to your next provider. Your primary care clinician is listed as Janes Zapien. If you have any questions after today's visit, please call [phone].

## 2018-04-20 NOTE — PROGRESS NOTES
Neurointerventional Surgery  Ambulatory Progress Note      Patient: Jakob Amaro MRN: [id number]  SSN: [id number]    YOB: 1950  Age: 69 y.o. Sex: male      History of Present Illness:      75 yo male presents to clinic today referred from neurology due to recent stroke during vacation in [facility]. Per MRI, infarction in the Left Internal capsule and Left anterior thalamus. He has significant ASVD intracranial and extracranial areas. Mild bilateral carotid stenosis, as well as moderate stenosis of his RMCA M2 and LPCA P2 segments. His current deficits include Right facial droop. Initially he also had slurred speech and difficulty swallowing, which have resolved. He denies weakness/numbness, vertigo/dizziness, visual changes. He is currently taking ASA 81, Plavix 75 mg daily and recently adjusted his dosing with Pravastatin to 80 mg. Denies any further transient neurological changes. Hypertensive with elevated BP today. Former smoker. Patient Active Problem List   Diagnosis Code    High cholesterol E78.00    HTN (hypertension) I10    Obesity (BMI 30-39. 9) E66.9    Acute CVA (cerebrovascular accident) ([facility] [address] I63.9    Heart block I45.9        Review of Systems    A comprehensive ROS was performed and was negative except for as per HPI. Objective:     Current Outpatient Prescriptions   Medication Sig Dispense Refill    clopidogrel (PLAVIX) 75 mg tab Take 1 Tab by mouth daily. 30 Tab 5    atenolol (TENORMIN) 50 mg tablet TAKE 1 TABLET EVERY DAY  1    aspirin 81 mg chewable tablet Take 1 Tab by mouth daily. Indications: Acute Coronary Syndrome      pravastatin (PRAVACHOL) 80 mg tablet Take 80 mg by mouth every seven (7) days.  amLODIPine (NORVASC) 2.5 mg tablet Take 2.5 mg by mouth daily.           Visit Vitals    BP (!) 166/108 (BP 1 Location: Left arm, BP Patient Position: Sitting)    Pulse (!) 59    Temp 98.1 °F (36.7 °C) (Oral)    Ht 5' 11\" (1.803 m)    Wt 227 lb (103 kg)  BMI 31.66 kg/m2       Allergies   Allergen Reactions    Erythromycin Hives     Pt states allergic to all mycins     Lipitor [Atorvastatin] Rash    Macrolide Antibiotics Hives    Niacin Rash    Other Medication Hives     All mycin medication groups/family        Current Outpatient Prescriptions   Medication Sig    clopidogrel (PLAVIX) 75 mg tab Take 1 Tab by mouth daily.  atenolol (TENORMIN) 50 mg tablet TAKE 1 TABLET EVERY DAY    aspirin 81 mg chewable tablet Take 1 Tab by mouth daily. Indications: Acute Coronary Syndrome    pravastatin (PRAVACHOL) 80 mg tablet Take 80 mg by mouth every seven (7) days.  amLODIPine (NORVASC) 2.5 mg tablet Take 2.5 mg by mouth daily. No current facility-administered medications for this visit. Neurologic Exam:  Mental Status:  Alert and oriented x 4. Appropriate affect, mood and behavior. Language:    Normal fluency, repetition, comprehension and naming. Cranial Nerves: Alpha Lucrecia Extraocular movements intact. Facial sensation intact V1 - V3. Right facial droop      Hearing intact bilaterally. No dysarthria. Motor:    Moves all extremities equally     Bulk and tone normal.        No involuntary movements.     Coordination & Gait: Normal,       Labs:  Lab Results   Component Value Date/Time    Sodium 141 06/23/2017 01:07 PM    Potassium 4.3 06/23/2017 01:07 PM    Chloride 103 06/23/2017 01:07 PM    CO2 31 06/23/2017 01:07 PM    Anion gap 7 06/23/2017 01:07 PM    Glucose 93 06/23/2017 01:07 PM    BUN 17 06/23/2017 01:07 PM    Creatinine 1.14 06/23/2017 01:07 PM    BUN/Creatinine ratio 15 06/23/2017 01:07 PM    GFR est AA >60 06/23/2017 01:07 PM    GFR est non-AA >60 06/23/2017 01:07 PM    Calcium 9.2 06/23/2017 01:07 PM     Lab Results   Component Value Date/Time    WBC 7.8 06/23/2017 01:07 PM    HGB 17.7 (H) 06/23/2017 01:07 PM    HCT 51.8 (H) 06/23/2017 01:07 PM    PLATELET 492 04/08/3119 01:07 PM    MCV 91.8 06/23/2017 01:07 PM Lab Results   Component Value Date/Time    MCH 31.4 06/23/2017 01:07 PM    MCHC 34.2 06/23/2017 01:07 PM    BASOPHILS 0 06/23/2017 01:07 PM    ABS. LYMPHOCYTES 1.4 06/23/2017 01:07 PM    ABS. MONOCYTES 0.7 06/23/2017 01:07 PM    ABS. EOSINOPHILS 0.2 06/23/2017 01:07 PM    ABS. BASOPHILS 0.0 06/23/2017 01:07 PM    Phosphorus 4.2 03/26/2017 05:30 AM    Magnesium 2.3 03/26/2017 05:30 AM       IMAGING:      Cta Head Neck W Cont    Result Date: 4/2/2018  IMPRESSION: Moderate intracranial and extracranial atherosclerotic vascular disease. Mild bilateral cervical internal carotid arterial stenoses greater on the left. Patent left vertebral artery supplying basilar artery. Diminutive right vertebral artery that ends in PICA. No proximal large vessel intracranial vascular occlusive change. Assessment/Plan:       ICD-10-CM ICD-9-CM    1. Intracranial atherosclerosis I67.2 437.0         - best medical therapy for stroke prevention utilized and should be continued for life, that includes controlling your SBP < 140 mmHg, daily ASA, statin therapy. - signs and sx of stroke discussed and the importance of the activation of EMS, patient verbalized understanding     - patient given opportunity to  ask questions , with information provided. - recommend annual carotid dopplers that may be performed with PCP. - at this time no further follow up with [facility] warranted      Case discussed with DR Garrett Fortune. Follow-up Disposition:    I have discussed the diagnosis with the patient and the intended plan as seen in the above orders. Patient is in agreement. The patient has received an after-visit summary and questions were answered concerning future plans. I have discussed medication side effects and warnings with the patient as well.       Chandrika Garcia NP

## 2018-05-22 ENCOUNTER — OFFICE VISIT (OUTPATIENT)
Dept: CARDIOLOGY CLINIC | Age: 68
End: 2018-05-22

## 2018-05-22 DIAGNOSIS — Z95.0 CARDIAC PACEMAKER IN SITU: Primary | ICD-10-CM

## 2018-07-03 ENCOUNTER — TELEPHONE (OUTPATIENT)
Dept: NEUROLOGY | Age: 68
End: 2018-07-03

## 2018-07-03 NOTE — TELEPHONE ENCOUNTER
R/t call to patient. Patient requested call back directly from NP. Did not give any information to nurse.

## 2018-07-03 NOTE — TELEPHONE ENCOUNTER
----- Message from Glenn Negrete sent at 7/3/2018 12:19 PM EDT -----  Regarding: CHARY Platt / Telephone  Contact: [phone]  Pt requested a return call regarding assistance for condition (stroke)

## 2018-07-05 NOTE — TELEPHONE ENCOUNTER
----- Message from Charlotte Walsh sent at 7/5/2018  9:40 AM EDT -----  Regarding: Samuel/telephone  Pt is requesting for you to call him and he did not want to leave a message. Pts number is [phone].

## 2018-08-31 ENCOUNTER — OFFICE VISIT (OUTPATIENT)
Dept: CARDIOLOGY CLINIC | Age: 68
End: 2018-08-31

## 2018-08-31 ENCOUNTER — CLINICAL SUPPORT (OUTPATIENT)
Dept: CARDIOLOGY CLINIC | Age: 68
End: 2018-08-31

## 2018-08-31 VITALS
DIASTOLIC BLOOD PRESSURE: 84 MMHG | WEIGHT: 228.6 LBS | HEIGHT: 71 IN | BODY MASS INDEX: 32 KG/M2 | SYSTOLIC BLOOD PRESSURE: 120 MMHG | OXYGEN SATURATION: 97 % | HEART RATE: 76 BPM | RESPIRATION RATE: 18 BRPM

## 2018-08-31 DIAGNOSIS — I10 HYPERTENSION, UNSPECIFIED TYPE: Chronic | ICD-10-CM

## 2018-08-31 DIAGNOSIS — E66.9 OBESITY (BMI 30-39.9): Chronic | ICD-10-CM

## 2018-08-31 DIAGNOSIS — I45.9 HEART BLOCK: ICD-10-CM

## 2018-08-31 DIAGNOSIS — E78.00 HIGH CHOLESTEROL: Primary | Chronic | ICD-10-CM

## 2018-08-31 DIAGNOSIS — I63.9 ACUTE CVA (CEREBROVASCULAR ACCIDENT) (HCC): ICD-10-CM

## 2018-08-31 DIAGNOSIS — I67.2 INTRACRANIAL ATHEROSCLEROSIS: ICD-10-CM

## 2018-08-31 DIAGNOSIS — Z95.0 CARDIAC PACEMAKER IN SITU: Primary | ICD-10-CM

## 2018-08-31 RX ORDER — LISINOPRIL 20 MG/1
TABLET ORAL DAILY
COMMUNITY
End: 2018-08-31 | Stop reason: SDUPTHER

## 2018-08-31 RX ORDER — FLUTICASONE FUROATE AND VILANTEROL TRIFENATATE 100; 25 UG/1; UG/1
2 POWDER RESPIRATORY (INHALATION) AS NEEDED
Refills: 11 | COMMUNITY
Start: 2018-08-16 | End: 2020-03-17 | Stop reason: ALTCHOICE

## 2018-08-31 RX ORDER — AMLODIPINE BESYLATE 10 MG/1
10 TABLET ORAL DAILY
Refills: 0 | COMMUNITY
Start: 2018-07-10 | End: 2020-03-17 | Stop reason: SDUPTHER

## 2018-08-31 RX ORDER — LISINOPRIL 20 MG/1
20 TABLET ORAL DAILY
Qty: 30 TAB | Refills: 0 | Status: SHIPPED | OUTPATIENT
Start: 2018-08-31 | End: 2018-09-26

## 2018-08-31 NOTE — PROGRESS NOTES
Chief Complaint   Patient presents with    Follow-up    Slow Heart Rate     SSS     1. Have you been to the ER, urgent care clinic since your last visit? Hospitalized since your last visit? No    2. Have you seen or consulted any other health care providers outside of the [address] [place] [address] since your last visit? Include any pap smears or colon screening.  No    Visit Vitals    /84 (BP 1 Location: Left arm, BP Patient Position: Sitting)    Pulse 76    Resp 18    Ht 5' 11\" (1.803 m)    Wt 228 lb 9.6 oz (103.7 kg)    SpO2 97%    BMI 31.88 kg/m2

## 2018-08-31 NOTE — PROGRESS NOTES
HISTORY OF PRESENTING ILLNESS      Susanne Apple is a 77 y.o. male with hypercholesteremia, hypertension and recent CVA with recent ILR injection for cryptogenic CVA however was found to have up to 6 second pauses on his ILR and thus underwent ILR removal followed by dual chamber PPM implantation. He presents today without complaints other than chronic shortness of breath. There is been some curiosity whether his atenolol may be contributing to this. Blood pressure is well controlled. EKG shows atrial paced rhythm. Device interrogation reveals normal device functioning with predominately atrial pacing.        ACTIVE PROBLEM LIST     Patient Active Problem List    Diagnosis Date Noted    Intracranial atherosclerosis 04/20/2018    Heart block 06/20/2017    Acute CVA (cerebrovascular accident) ([facility] [address] 03/26/2017    High cholesterol 03/25/2017    HTN (hypertension) 03/25/2017    Obesity (BMI 30-39.9) 03/25/2017           PAST MEDICAL HISTORY     Past Medical History:   Diagnosis Date    Hypercholesteremia     Hypertension     Migraines     Obesity (BMI 30-39.9) 3/25/2017    Stroke ([facility] [address]            PAST SURGICAL HISTORY     Past Surgical History:   Procedure Laterality Date    HX ORTHOPAEDIC      knees          ALLERGIES     Allergies   Allergen Reactions    Erythromycin Hives     Pt states allergic to all mycins     Lipitor [Atorvastatin] Rash    Macrolide Antibiotics Hives    Niacin Rash    Other Medication Hives     All mycin medication groups/family           FAMILY HISTORY     Family History   Problem Relation Age of Onset    Stroke Father    [facility] Migraines Sister     Hypertension Neg Hx     Heart Disease Neg Hx     negative for cardiac disease       SOCIAL HISTORY     Social History     Social History    Marital status:      Spouse name: N/A    Number of children: N/A    Years of education: N/A     Social History Main Topics    Smoking status: Former Smoker     Quit date: 1977    Smokeless tobacco: Never Used    Alcohol use 1.2 oz/week     2 Shots of liquor per week    Drug use: No    Sexual activity: Yes     Other Topics Concern    None     Social History Narrative         MEDICATIONS     Current Outpatient Prescriptions   Medication Sig    amLODIPine (NORVASC) 10 mg tablet Take 10 mg by mouth daily.  BREO ELLIPTA 100-25 mcg/dose inhaler Take 2 Puffs by mouth as needed.  pravastatin (PRAVACHOL) 80 mg tablet Take 80 mg by mouth daily.  clopidogrel (PLAVIX) 75 mg tab Take 1 Tab by mouth daily.  atenolol (TENORMIN) 50 mg tablet TAKE 1 TABLET EVERY DAY    aspirin 81 mg chewable tablet Take 1 Tab by mouth daily. Indications: Acute Coronary Syndrome    amLODIPine (NORVASC) 2.5 mg tablet Take 2.5 mg by mouth daily. No current facility-administered medications for this visit. I have reviewed the nurses notes, vitals, problem list, allergy list, medical history, family, social history and medications. REVIEW OF SYMPTOMS      General: Pt denies excessive weight gain or loss. Pt is able to conduct ADL's  HEENT: Denies blurred vision, headaches, hearing loss, epistaxis and difficulty swallowing. Respiratory: Denies cough, congestion, shortness of breath, SOLANO, wheezing or stridor. Cardiovascular: Denies precordial pain, palpitations, edema or PND  Gastrointestinal: Denies poor appetite, indigestion, abdominal pain or blood in stool  Genitourinary: Denies hematuria, dysuria, increased urinary frequency  Musculoskeletal: Denies joint pain or swelling from muscles or joints  Neurologic: Denies tremor, paresthesias, headache, or sensory motor disturbance  Psychiatric: Denies confusion, insomnia, depression  Integumentray: Denies rash, itching or ulcers.   Hematologic: Denies easy bruising, bleeding       PHYSICAL EXAMINATION      Vitals:    08/31/18 0906   BP: 120/84   Pulse: 76   Resp: 18   SpO2: 97%   Weight: 228 lb 9.6 oz (103.7 kg)   Height: 5' 11\" (1.803 m) General: Well developed, in no acute distress. HEENT: No jaundice, oral mucosa moist, no oral ulcers  Neck: Supple, no stiffness, no lymphadenopathy, supple  Heart:  Normal S1/S2 negative S3 or S4. Regular, no murmur, gallop or rub, no jugular venous distention  Respiratory: Clear bilaterally x 4, no wheezing or rales  Abdomen:   Soft, non-tender, bowel sounds are active.   Extremities:  No edema, normal cap refill, no cyanosis. Musculoskeletal: No clubbing, no deformities  Neuro: A&Ox3, speech clear, gait stable, cooperative, no focal neurologic deficits  Skin: Skin color is normal. No rashes or lesions. Non diaphoretic, moist.  Vascular: 2+ pulses symmetric in all extremities       DIAGNOSTIC DATA      EKG: Atrial paced       LABORATORY DATA      Lab Results   Component Value Date/Time    WBC 7.8 06/23/2017 01:07 PM    HGB 17.7 (H) 06/23/2017 01:07 PM    HCT 51.8 (H) 06/23/2017 01:07 PM    PLATELET 497 89/81/9269 01:07 PM    MCV 91.8 06/23/2017 01:07 PM      Lab Results   Component Value Date/Time    Sodium 141 06/23/2017 01:07 PM    Potassium 4.3 06/23/2017 01:07 PM    Chloride 103 06/23/2017 01:07 PM    CO2 31 06/23/2017 01:07 PM    Anion gap 7 06/23/2017 01:07 PM    Glucose 93 06/23/2017 01:07 PM    BUN 17 06/23/2017 01:07 PM    Creatinine 1.14 06/23/2017 01:07 PM    BUN/Creatinine ratio 15 06/23/2017 01:07 PM    GFR est AA >60 06/23/2017 01:07 PM    GFR est non-AA >60 06/23/2017 01:07 PM    Calcium 9.2 06/23/2017 01:07 PM    Bilirubin, total 0.5 03/25/2017 10:13 AM    AST (SGOT) 18 03/25/2017 10:13 AM    Alk. phosphatase 113 03/25/2017 10:13 AM    Protein, total 8.4 (H) 03/25/2017 10:13 AM    Albumin 3.9 03/25/2017 10:13 AM    Globulin 4.5 (H) 03/25/2017 10:13 AM    A-G Ratio 0.9 (L) 03/25/2017 10:13 AM    ALT (SGPT) 21 03/25/2017 10:13 AM           ASSESSMENT      1. Bradycardia/SSS  2. CVA  3. Hypertension  4. Hyperlipidemia   5.  PPM                        A. Dual                        B. [employer]       PLAN     Trial of changing atenolol to lisinopril. Will monitor for blood pressure control as well as whether this improves his shortness of breath. Rate response was turned on today. Check metabolic panel in 1 week. FOLLOW-UP     1 year with Hong Lora      Thank you, Perico Zambrano MD and Dr. Ruth Bang for allowing me to participate in the care of this extraordinarily pleasant male. Please do not hesitate to contact me for further questions/concerns.          James Morales MD  Cardiac Electrophysiology / Cardiology    [address]  [address] [place] [address], [facility]. Melissa Nielson.    [facility], [facility]  [phone] / [phone] Fax   [phone] / [phone] Fax

## 2018-09-25 ENCOUNTER — TELEPHONE (OUTPATIENT)
Dept: CARDIOLOGY CLINIC | Age: 68
End: 2018-09-25

## 2018-09-25 DIAGNOSIS — I10 HYPERTENSION, UNSPECIFIED TYPE: Primary | Chronic | ICD-10-CM

## 2018-09-25 NOTE — TELEPHONE ENCOUNTER
Returned call. Patient states his pcp recommended a change from lisinopril to a different medication. Patient has history of rash when he was on enalapril. Ace inhibitors added to allergy list - rash. Reports SOB when taking atenolol in the past.  Please advise.

## 2018-09-25 NOTE — TELEPHONE ENCOUNTER
Pt is calling in regards to the medication lisinopril and wanted to know if Dr. Nigel Rand can provide a substitute medication that he can take do to some ingredients being that lisinopril has in it causes patient to experience an allergic reaction. [phone]  Thanks.

## 2018-09-26 RX ORDER — LOSARTAN POTASSIUM 50 MG/1
50 TABLET ORAL DAILY
Qty: 30 TAB | Refills: 3 | Status: SHIPPED | OUTPATIENT
Start: 2018-09-26 | End: 2020-03-17 | Stop reason: SDUPTHER

## 2018-09-26 NOTE — TELEPHONE ENCOUNTER
Called patient. Instructed patient to discontinue lisinopril. Start losartan 50mg daily. Understanding expressed.

## 2019-09-10 NOTE — PROGRESS NOTES
HISTORY OF PRESENTING ILLNESS      Sienna Lui is a 68 y.o. male with hypercholesteremia, hypertension and recent CVA with recent ILR injection for cryptogenic CVA however was found to have up to 6 second pauses on his ILR and thus underwent ILR removal followed by dual chamber PPM implantation. EKG shows atrial paced rhythm. Device interrogation reveals normal device functioning. He denies cardiac complaints. His blood pressures at home have fei normotensive.      ACTIVE PROBLEM LIST     Patient Active Problem List    Diagnosis Date Noted    Intracranial atherosclerosis 2018    Heart block 2017    Acute CVA (cerebrovascular accident) ([facility] [address] 2017    High cholesterol 2017    HTN (hypertension) 2017    Obesity (BMI 30-39.9) 2017           PAST MEDICAL HISTORY     Past Medical History:   Diagnosis Date    Hypercholesteremia     Hypertension     Migraines     Obesity (BMI 30-39.9) 3/25/2017    Stroke ([facility] [address]            PAST SURGICAL HISTORY     Past Surgical History:   Procedure Laterality Date    HX ORTHOPAEDIC      knees          ALLERGIES     Allergies   Allergen Reactions    Ace Inhibitors Rash    Erythromycin Hives     Pt states allergic to all mycins     Lipitor [Atorvastatin] Rash    Macrolide Antibiotics Hives    Niacin Rash    Other Medication Hives     All mycin medication groups/family           FAMILY HISTORY     Family History   Problem Relation Age of Onset    Stroke Father    [facility] Migraines Sister     Hypertension Neg Hx     Heart Disease Neg Hx     negative for cardiac disease       SOCIAL HISTORY     Social History     Socioeconomic History    Marital status:      Spouse name: Not on file    Number of children: Not on file    Years of education: Not on file    Highest education level: Not on file   Tobacco Use    Smoking status: Former Smoker     Last attempt to quit:      Years since quittin.7    Smokeless tobacco: Never Used   Substance and Sexual Activity    Alcohol use: Yes     Alcohol/week: 2.0 standard drinks     Types: 2 Shots of liquor per week    Drug use: No    Sexual activity: Yes         MEDICATIONS     Current Outpatient Medications   Medication Sig    losartan (COZAAR) 50 mg tablet Take 1 Tab by mouth daily.  amLODIPine (NORVASC) 10 mg tablet Take 10 mg by mouth daily.  BREO ELLIPTA 100-25 mcg/dose inhaler Take 2 Puffs by mouth as needed.  pravastatin (PRAVACHOL) 80 mg tablet Take 80 mg by mouth daily.  amLODIPine (NORVASC) 2.5 mg tablet Take 2.5 mg by mouth daily.  clopidogrel (PLAVIX) 75 mg tab Take 1 Tab by mouth daily.  aspirin 81 mg chewable tablet Take 1 Tab by mouth daily. Indications: Acute Coronary Syndrome     No current facility-administered medications for this visit. I have reviewed the nurses notes, vitals, problem list, allergy list, medical history, family, social history and medications. REVIEW OF SYMPTOMS      General: Pt denies excessive weight gain or loss. Pt is able to conduct ADL's  HEENT: Denies blurred vision, headaches, hearing loss, epistaxis and difficulty swallowing. Respiratory: Denies cough, congestion, shortness of breath, SOLANO, wheezing or stridor. Cardiovascular: Denies precordial pain, palpitations, edema or PND  Gastrointestinal: Denies poor appetite, indigestion, abdominal pain or blood in stool  Genitourinary: Denies hematuria, dysuria, increased urinary frequency  Musculoskeletal: Denies joint pain or swelling from muscles or joints  Neurologic: Denies tremor, paresthesias, headache, or sensory motor disturbance  Psychiatric: Denies confusion, insomnia, depression  Integumentray: Denies rash, itching or ulcers. Hematologic: Denies easy bruising, bleeding       PHYSICAL EXAMINATION      There were no vitals filed for this visit. General: Well developed, in no acute distress.   HEENT: No jaundice, oral mucosa moist, no oral ulcers  Neck: Supple, no stiffness, no lymphadenopathy, supple  Heart:  Normal S1/S2 negative S3 or S4. Regular, no murmur, gallop or rub, no jugular venous distention  Respiratory: Clear bilaterally x 4, no wheezing or rales  Abdomen:   Soft, non-tender, bowel sounds are active.   Extremities:  No edema, normal cap refill, no cyanosis. Musculoskeletal: No clubbing, no deformities  Neuro: A&Ox3, speech clear, gait stable, cooperative, no focal neurologic deficits  Skin: Skin color is normal. No rashes or lesions. Non diaphoretic, moist.  Vascular: 2+ pulses symmetric in all extremities       DIAGNOSTIC DATA      EKG:        LABORATORY DATA      Lab Results   Component Value Date/Time    WBC 7.8 06/23/2017 01:07 PM    HGB 17.7 (H) 06/23/2017 01:07 PM    HCT 51.8 (H) 06/23/2017 01:07 PM    PLATELET 236 10/99/1244 01:07 PM    MCV 91.8 06/23/2017 01:07 PM      Lab Results   Component Value Date/Time    Sodium 141 06/23/2017 01:07 PM    Potassium 4.3 06/23/2017 01:07 PM    Chloride 103 06/23/2017 01:07 PM    CO2 31 06/23/2017 01:07 PM    Anion gap 7 06/23/2017 01:07 PM    Glucose 93 06/23/2017 01:07 PM    BUN 17 06/23/2017 01:07 PM    Creatinine 1.14 06/23/2017 01:07 PM    BUN/Creatinine ratio 15 06/23/2017 01:07 PM    GFR est AA >60 06/23/2017 01:07 PM    GFR est non-AA >60 06/23/2017 01:07 PM    Calcium 9.2 06/23/2017 01:07 PM    Bilirubin, total 0.5 03/25/2017 10:13 AM    AST (SGOT) 18 03/25/2017 10:13 AM    Alk. phosphatase 113 03/25/2017 10:13 AM    Protein, total 8.4 (H) 03/25/2017 10:13 AM    Albumin 3.9 03/25/2017 10:13 AM    Globulin 4.5 (H) 03/25/2017 10:13 AM    A-G Ratio 0.9 (L) 03/25/2017 10:13 AM    ALT (SGPT) 21 03/25/2017 10:13 AM           ASSESSMENT      1. Bradycardia/SSS  2. CVA  3. Hypertension  4. Hyperlipidemia   5. PPM                        S. Dual                        V. [employer]           PLAN     Continue to monitor per device clinic and follow up in one year.       FOLLOW-UP   1 year    Thank you, Negar Echevarria MD and  for allowing me to participate in the care of this extraordinarily pleasant male. Please do not hesitate to contact me for further questions/concerns.        Judith Barth NP

## 2019-09-11 ENCOUNTER — OFFICE VISIT (OUTPATIENT)
Dept: CARDIOLOGY CLINIC | Age: 69
End: 2019-09-11

## 2019-09-11 ENCOUNTER — CLINICAL SUPPORT (OUTPATIENT)
Dept: CARDIOLOGY CLINIC | Age: 69
End: 2019-09-11

## 2019-09-11 VITALS
RESPIRATION RATE: 18 BRPM | HEART RATE: 77 BPM | BODY MASS INDEX: 34.02 KG/M2 | HEIGHT: 71 IN | DIASTOLIC BLOOD PRESSURE: 80 MMHG | SYSTOLIC BLOOD PRESSURE: 136 MMHG | OXYGEN SATURATION: 95 % | WEIGHT: 243 LBS

## 2019-09-11 DIAGNOSIS — I63.9 ACUTE CVA (CEREBROVASCULAR ACCIDENT) (HCC): ICD-10-CM

## 2019-09-11 DIAGNOSIS — E66.9 OBESITY (BMI 30-39.9): ICD-10-CM

## 2019-09-11 DIAGNOSIS — Z95.0 CARDIAC PACEMAKER IN SITU: Primary | ICD-10-CM

## 2019-09-11 DIAGNOSIS — I45.9 HEART BLOCK: ICD-10-CM

## 2019-09-11 DIAGNOSIS — I10 HYPERTENSION, UNSPECIFIED TYPE: ICD-10-CM

## 2019-09-11 NOTE — PROGRESS NOTES
Room # 4  Visit Vitals  /82 (BP 1 Location: Left arm, BP Patient Position: Sitting)   Pulse 77   Resp 18   Ht 5' 11\" (1.803 m)   Wt 243 lb (110.2 kg)   SpO2 95%   BMI 33.89 kg/m²     *NEEDS BP RETAKES*

## 2020-03-17 ENCOUNTER — OFFICE VISIT (OUTPATIENT)
Dept: INTERNAL MEDICINE CLINIC | Age: 70
End: 2020-03-17

## 2020-03-17 ENCOUNTER — HOSPITAL ENCOUNTER (OUTPATIENT)
Dept: LAB | Age: 70
Discharge: HOME | End: 2020-03-17
Payer: MEDICARE

## 2020-03-17 VITALS
TEMPERATURE: 98 F | BODY MASS INDEX: 34.02 KG/M2 | DIASTOLIC BLOOD PRESSURE: 79 MMHG | RESPIRATION RATE: 16 BRPM | OXYGEN SATURATION: 98 % | WEIGHT: 243 LBS | HEART RATE: 79 BPM | SYSTOLIC BLOOD PRESSURE: 132 MMHG | HEIGHT: 71 IN

## 2020-03-17 DIAGNOSIS — Z79.01 CHRONIC ANTICOAGULATION: ICD-10-CM

## 2020-03-17 DIAGNOSIS — I10 HTN, GOAL BELOW 130/80: ICD-10-CM

## 2020-03-17 DIAGNOSIS — Z23 ENCOUNTER FOR IMMUNIZATION: ICD-10-CM

## 2020-03-17 DIAGNOSIS — Z12.5 PROSTATE CANCER SCREENING: ICD-10-CM

## 2020-03-17 DIAGNOSIS — E78.5 DYSLIPIDEMIA, GOAL LDL BELOW 70: Primary | ICD-10-CM

## 2020-03-17 DIAGNOSIS — E78.5 DYSLIPIDEMIA, GOAL LDL BELOW 70: ICD-10-CM

## 2020-03-17 DIAGNOSIS — I63.9 ACUTE CVA (CEREBROVASCULAR ACCIDENT) (HCC): ICD-10-CM

## 2020-03-17 DIAGNOSIS — R29.810 FACIAL DROOP: ICD-10-CM

## 2020-03-17 LAB
BASOPHILS # BLD: 0.1 K/UL (ref 0–0.1)
BASOPHILS NFR BLD: 1 % (ref 0–1)
CHOLEST SERPL-MCNC: 129 MG/DL
DIFFERENTIAL METHOD BLD: ABNORMAL
EOSINOPHIL # BLD: 0.3 K/UL (ref 0–0.4)
EOSINOPHIL NFR BLD: 4 % (ref 0–7)
ERYTHROCYTE [DISTWIDTH] IN BLOOD BY AUTOMATED COUNT: 13.1 % (ref 11.5–14.5)
HCT VFR BLD AUTO: 52.3 % (ref 36.6–50.3)
HDLC SERPL-MCNC: 47 MG/DL
HDLC SERPL: 2.7 {RATIO} (ref 0–5)
HGB BLD-MCNC: 16.8 G/DL (ref 12.1–17)
IMM GRANULOCYTES # BLD AUTO: 0 K/UL (ref 0–0.04)
IMM GRANULOCYTES NFR BLD AUTO: 1 % (ref 0–0.5)
LDLC SERPL CALC-MCNC: 62 MG/DL (ref 0–100)
LIPID PROFILE,FLP: NORMAL
LYMPHOCYTES # BLD: 1.3 K/UL (ref 0.8–3.5)
LYMPHOCYTES NFR BLD: 21 % (ref 12–49)
MCH RBC QN AUTO: 30.6 PG (ref 26–34)
MCHC RBC AUTO-ENTMCNC: 32.1 G/DL (ref 30–36.5)
MCV RBC AUTO: 95.3 FL (ref 80–99)
MONOCYTES # BLD: 0.6 K/UL (ref 0–1)
MONOCYTES NFR BLD: 10 % (ref 5–13)
NEUTS SEG # BLD: 4.2 K/UL (ref 1.8–8)
NEUTS SEG NFR BLD: 63 % (ref 32–75)
NRBC # BLD: 0 K/UL (ref 0–0.01)
NRBC BLD-RTO: 0 PER 100 WBC
PLATELET # BLD AUTO: 228 K/UL (ref 150–400)
PMV BLD AUTO: 9.8 FL (ref 8.9–12.9)
PSA SERPL-MCNC: 4.2 NG/ML (ref 0.01–4)
RBC # BLD AUTO: 5.49 M/UL (ref 4.1–5.7)
TRIGL SERPL-MCNC: 100 MG/DL (ref ?–150)
VLDLC SERPL CALC-MCNC: 20 MG/DL
WBC # BLD AUTO: 6.5 K/UL (ref 4.1–11.1)

## 2020-03-17 PROCEDURE — 80061 LIPID PANEL: CPT

## 2020-03-17 PROCEDURE — 36415 COLL VENOUS BLD VENIPUNCTURE: CPT

## 2020-03-17 PROCEDURE — 84153 ASSAY OF PSA TOTAL: CPT

## 2020-03-17 PROCEDURE — 85025 COMPLETE CBC W/AUTO DIFF WBC: CPT

## 2020-03-17 RX ORDER — PRAVASTATIN SODIUM 80 MG/1
80 TABLET ORAL DAILY
Qty: 90 TAB | Refills: 1 | Status: SHIPPED | OUTPATIENT
Start: 2020-03-17 | End: 2020-09-29

## 2020-03-17 RX ORDER — CLOPIDOGREL BISULFATE 75 MG/1
75 TABLET ORAL DAILY
Qty: 90 TAB | Refills: 1 | Status: SHIPPED | OUTPATIENT
Start: 2020-03-17 | End: 2020-09-29

## 2020-03-17 RX ORDER — AMLODIPINE BESYLATE 10 MG/1
10 TABLET ORAL DAILY
Qty: 90 TAB | Refills: 1 | Status: SHIPPED | OUTPATIENT
Start: 2020-03-17 | End: 2020-09-29

## 2020-03-17 RX ORDER — LOSARTAN POTASSIUM 50 MG/1
50 TABLET ORAL DAILY
Qty: 90 TAB | Refills: 1 | Status: SHIPPED | OUTPATIENT
Start: 2020-03-17 | End: 2020-03-27 | Stop reason: RX

## 2020-03-17 NOTE — PROGRESS NOTES
Alexi Cotton is a 68 y.o. male. HPI   New to me and this practice, previously followed by Dr. Federico Pena at [facility]. 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 He spends one month in [place], rest of year in [place]. Medical History:  1. Hypertension. 2. Dyslipidemia, on Pravastatin. 3. Stroke in 2018 and 2019 with expressive aphasia and mild right facial droop. Has seen neurology. [place] to be cryptogenic. 4. History of bradycardia, requiring pacemaker, followed by Dr. Amy Muñoz. 5. Small umbilical hernia, not symptomatic. Colonoscopy roughly three years ago, cannot recall last pneumonia shot. Social History:  Smoked for eight years, but quit in his 25s. Only occasional alcohol. He is retired. Review of Systems   Constitutional: Negative for chills, fever, malaise/fatigue and weight loss. Eyes: Negative for blurred vision. Respiratory: Negative for cough, shortness of breath and wheezing. Cardiovascular: Negative for chest pain, palpitations, orthopnea, leg swelling and PND. Gastrointestinal: Negative for abdominal pain, constipation, diarrhea, heartburn, nausea and vomiting. Musculoskeletal: Positive for back pain (tender coccyx after fall in pool). Negative for myalgias. Neurological: Positive for speech change (mild aphasia). Negative for dizziness, tingling, tremors, sensory change, focal weakness, loss of consciousness and headaches. Psychiatric/Behavioral: Negative for depression. All other systems reviewed and are negative. Objective  Physical Exam  Vitals signs and nursing note reviewed. Constitutional:       Appearance: Normal appearance. He is well-developed. HENT:      Head: Normocephalic and atraumatic.       Right Ear: Tympanic membrane, ear canal and external ear normal.      Left Ear: Tympanic membrane, ear canal and external ear normal.      Nose: Nose normal.      Right Sinus: No maxillary sinus tenderness or frontal sinus tenderness. Left Sinus: No maxillary sinus tenderness or frontal sinus tenderness. Mouth/Throat:      Mouth: Mucous membranes are moist.      Pharynx: No oropharyngeal exudate or posterior oropharyngeal erythema. Eyes:      General:         Right eye: No discharge. Left eye: No discharge. Extraocular Movements: Extraocular movements intact. Conjunctiva/sclera: Conjunctivae normal.      Pupils: Pupils are equal, round, and reactive to light. Neck:      Musculoskeletal: Normal range of motion and neck supple. Thyroid: No thyromegaly. Vascular: No carotid bruit. Cardiovascular:      Rate and Rhythm: Normal rate and regular rhythm. Heart sounds: Normal heart sounds. Pulmonary:      Effort: Pulmonary effort is normal. No respiratory distress. Breath sounds: Normal breath sounds. No wheezing or rales. Abdominal:      Tenderness: There is no abdominal tenderness. There is no rebound. Hernia: A hernia (umbilical hernia reducible) is present. Musculoskeletal:      Right lower leg: No edema. Left lower leg: No edema. Lymphadenopathy:      Cervical: No cervical adenopathy. Skin:     Findings: No rash. Neurological:      Mental Status: He is alert and oriented to person, place, and time. Sensory: Sensation is intact. Motor: Motor function is intact. Comments: Mild  Right facial asymmetry able to fully close both eyes and tongue is midline   Psychiatric:         Mood and Affect: Mood normal.         Behavior: Behavior normal.          Assessment & Plan  Diagnoses and all orders for this visit:    1. Dyslipidemia, goal LDL below 70  -     METABOLIC PANEL, COMPREHENSIVE; Future  -     LIPID PANEL; Future    2. HTN, goal below 130/80    3. Prostate cancer screening  -     PSA, DIAGNOSTIC (PROSTATE SPECIFIC AG); Future    4. Chronic anticoagulation  -     CBC WITH AUTOMATED DIFF; Future    5. Acute CVA (cerebrovascular accident) ([facility] [address]    6. Encounter for immunization  -     PNEUMOCOCCAL POLYSACCHARIDE VACCINE, 23-VALENT, ADULT OR IMMUNOSUPPRESSED PT DOSE,      Brice Luna MD

## 2020-03-17 NOTE — PROGRESS NOTES
.1. Have you been to the ER, urgent care clinic since your last visit? Hospitalized since your last visit? No    2. Have you seen or consulted any other health care providers outside of the [address] [place] [address] since your last visit? Include any pap smears or colon screening.   Christel Toribio with [facility]

## 2020-03-23 ENCOUNTER — TELEPHONE (OUTPATIENT)
Dept: INTERNAL MEDICINE CLINIC | Age: 70
End: 2020-03-23

## 2020-03-23 DIAGNOSIS — I10 HTN, GOAL BELOW 130/80: Primary | ICD-10-CM

## 2020-03-23 RX ORDER — LOSARTAN POTASSIUM 50 MG/1
50 TABLET ORAL DAILY
Qty: 90 TAB | Refills: 1 | Status: SHIPPED | OUTPATIENT
Start: 2020-03-23 | End: 2020-03-27 | Stop reason: RX

## 2020-03-23 NOTE — TELEPHONE ENCOUNTER
Pt states Losartan was not refilled with the rest of the meds on 3/17/20. He is requesting a 90 or 6 mo supply be sent to updated [facility] on file. Please inform pt once sent. Thanks.

## 2020-03-23 NOTE — TELEPHONE ENCOUNTER
Discussed his psa is up from last check-he will see urology in a few months when covid not so  Urgent  Also sent his losartan to abe at [facility] per his request

## 2020-03-27 RX ORDER — LOSARTAN POTASSIUM 100 MG/1
TABLET ORAL
Qty: 45 TAB | Refills: 1 | Status: SHIPPED | OUTPATIENT
Start: 2020-03-27 | End: 2020-07-07 | Stop reason: SDUPTHER

## 2020-07-08 RX ORDER — LOSARTAN POTASSIUM 100 MG/1
TABLET ORAL
Qty: 45 TAB | Refills: 1 | Status: SHIPPED | OUTPATIENT
Start: 2020-07-08 | End: 2020-10-20 | Stop reason: ALTCHOICE

## 2020-09-16 NOTE — PROGRESS NOTES
HISTORY OF PRESENTING ILLNESS      Denia Moreno is a 65 y.o. male with hypercholesteremia, hypertension and recent CVA with recent ILR injection for cryptogenic CVA however was found to have up to 6 second pauses on his ILR and thus underwent ILR removal followed by dual chamber PPM implantation. EKG shows sinus rhythm.  Device interrogation reveals normal device functioning. He denies cardiac complaints and has been doing well. PAST MEDICAL HISTORY     Past Medical History:   Diagnosis Date    Hypercholesteremia     Hypertension     Migraines     Obesity (BMI 30-39.9) 3/25/2017    Stroke ([address]            PAST SURGICAL HISTORY     Past Surgical History:   Procedure Laterality Date    HX ORTHOPAEDIC      knees          ALLERGIES     Allergies   Allergen Reactions    Ace Inhibitors Rash    Erythromycin Hives     Pt states allergic to all mycins     Lipitor [Atorvastatin] Rash    Macrolide Antibiotics Hives    Niacin Rash    Other Medication Hives     All mycin medication groups/family           FAMILY HISTORY     Family History   Problem Relation Age of Onset    Stroke Father    [facility] Migraines Sister     Stroke Mother     Lung Cancer Brother         smoker    Hypertension Neg Hx     Heart Disease Neg Hx     negative for cardiac disease       SOCIAL HISTORY     Social History     Socioeconomic History    Marital status:      Spouse name: Not on file    Number of children: Not on file    Years of education: Not on file    Highest education level: Not on file   Tobacco Use    Smoking status: Former Smoker     Last attempt to quit:      Years since quittin.7    Smokeless tobacco: Never Used   Substance and Sexual Activity    Alcohol use:  Yes     Alcohol/week: 2.0 standard drinks     Types: 2 Shots of liquor per week     Comment: occ    Drug use: No    Sexual activity: Yes         MEDICATIONS     Current Outpatient Medications   Medication Sig    naproxen sodium (Aleve) 220 mg cap Take  by mouth.  losartan (COZAAR) 100 mg tablet Take 1/2 tablet by mouth daily *due to back order of 50 mg tab*    amLODIPine (NORVASC) 10 mg tablet Take 1 Tab by mouth daily.  pravastatin (PRAVACHOL) 80 mg tablet Take 1 Tab by mouth daily.  clopidogreL (PLAVIX) 75 mg tab Take 1 Tab by mouth daily.  aspirin 81 mg chewable tablet Take 1 Tab by mouth daily. Indications: Acute Coronary Syndrome     No current facility-administered medications for this visit. I have reviewed the nurses notes, vitals, problem list, allergy list, medical history, family, social history and medications. REVIEW OF SYMPTOMS      General: Pt denies excessive weight gain or loss. Pt is able to conduct ADL's  HEENT: Denies blurred vision, headaches, hearing loss, epistaxis and difficulty swallowing. Respiratory: Denies cough, congestion, shortness of breath, SOLANO, wheezing or stridor. Cardiovascular: Denies precordial pain, palpitations, edema or PND  Gastrointestinal: Denies poor appetite, indigestion, abdominal pain or blood in stool  Genitourinary: Denies hematuria, dysuria, increased urinary frequency  Musculoskeletal: Denies joint pain or swelling from muscles or joints  Neurologic: Denies tremor, paresthesias, headache, or sensory motor disturbance  Psychiatric: Denies confusion, insomnia, depression  Integumentray: Denies rash, itching or ulcers. Hematologic: Denies easy bruising, bleeding       PHYSICAL EXAMINATION      Vitals: see vitals section  General: Well developed, in no acute distress. HEENT: No jaundice, oral mucosa moist, no oral ulcers  Neck: Supple, no stiffness, no lymphadenopathy, supple  Heart:  Normal S1/S2 negative S3 or S4. Regular, no murmur, gallop or rub, no jugular venous distention  Respiratory: Clear bilaterally x 4, no wheezing or rales  Abdomen:   Soft, non-tender, bowel sounds are active.    Extremities:  No edema, normal cap refill, no cyanosis. Musculoskeletal: No clubbing, no deformities  Neuro: A&Ox3, speech clear, gait stable, cooperative, no focal neurologic deficits  Skin: Skin color is normal. No rashes or lesions. Non diaphoretic, moist.  Vascular: 2+ pulses symmetric in all extremities       DIAGNOSTIC DATA      EKG: sinus rhythm        LABORATORY DATA      Lab Results   Component Value Date/Time    WBC 6.5 03/17/2020 09:40 AM    HGB 16.8 03/17/2020 09:40 AM    HCT 52.3 (H) 03/17/2020 09:40 AM    PLATELET 540 02/04/4303 09:40 AM    MCV 95.3 03/17/2020 09:40 AM      Lab Results   Component Value Date/Time    Sodium 141 06/23/2017 01:07 PM    Potassium 4.3 06/23/2017 01:07 PM    Chloride 103 06/23/2017 01:07 PM    CO2 31 06/23/2017 01:07 PM    Anion gap 7 06/23/2017 01:07 PM    Glucose 93 06/23/2017 01:07 PM    BUN 17 06/23/2017 01:07 PM    Creatinine 1.14 06/23/2017 01:07 PM    BUN/Creatinine ratio 15 06/23/2017 01:07 PM    GFR est AA >60 06/23/2017 01:07 PM    GFR est non-AA >60 06/23/2017 01:07 PM    Calcium 9.2 06/23/2017 01:07 PM    Bilirubin, total 0.5 03/25/2017 10:13 AM    Alk. phosphatase 113 03/25/2017 10:13 AM    Protein, total 8.4 (H) 03/25/2017 10:13 AM    Albumin 3.9 03/25/2017 10:13 AM    Globulin 4.5 (H) 03/25/2017 10:13 AM    A-G Ratio 0.9 (L) 03/25/2017 10:13 AM    ALT (SGPT) 21 03/25/2017 10:13 AM           ASSESSMENT      1. Bradycardia/SSS  2. CVA  3. Hypertension  4. Hyperlipidemia   5. PPM                        S. Dual                        R. [employer]     PLAN     Continue current medical therapy and follow ups per device clinic. ICD-10-CM ICD-9-CM    1. Bradycardia  R00.1 427.89 AMB POC EKG ROUTINE W/ 12 LEADS, INTER & REP   2. SSS (sick sinus syndrome) (HCC)  I49.5 427.81 AMB POC EKG ROUTINE W/ 12 LEADS, INTER & REP   3. Heart block  I45.9 426.9    4. Cardiac pacemaker in situ  Z95.0 V45.01    5. Acute CVA (cerebrovascular accident) ([facility] [address]  I63.9 434.91    6.  Essential hypertension  I10 401.9      Orders Placed This Encounter    AMB POC EKG ROUTINE W/ 12 LEADS, INTER & REP     Order Specific Question:   Reason for Exam:     Answer:   Denice Flor    naproxen sodium (Aleve) 220 mg cap     Sig: Take  by mouth. FOLLOW-UP   1 year    Thank you, Royal Juan José MD for allowing me to participate in the care of this extraordinarily pleasant male. Please do not hesitate to contact me for further questions/concerns.      [address]  [address], [address] [facility], [address]  [address]  [phone] / [phone] Fax   [phone] / [phone] Fax

## 2020-09-18 ENCOUNTER — OFFICE VISIT (OUTPATIENT)
Dept: CARDIOLOGY CLINIC | Age: 70
End: 2020-09-18
Payer: MEDICARE

## 2020-09-18 ENCOUNTER — CLINICAL SUPPORT (OUTPATIENT)
Dept: CARDIOLOGY CLINIC | Age: 70
End: 2020-09-18
Payer: MEDICARE

## 2020-09-18 VITALS
DIASTOLIC BLOOD PRESSURE: 78 MMHG | BODY MASS INDEX: 33.88 KG/M2 | HEART RATE: 84 BPM | SYSTOLIC BLOOD PRESSURE: 142 MMHG | HEIGHT: 71 IN | WEIGHT: 242 LBS | OXYGEN SATURATION: 97 %

## 2020-09-18 DIAGNOSIS — I49.5 SSS (SICK SINUS SYNDROME) (HCC): ICD-10-CM

## 2020-09-18 DIAGNOSIS — R00.1 BRADYCARDIA: Primary | ICD-10-CM

## 2020-09-18 DIAGNOSIS — I63.9 ACUTE CVA (CEREBROVASCULAR ACCIDENT) (HCC): ICD-10-CM

## 2020-09-18 DIAGNOSIS — I45.9 HEART BLOCK: ICD-10-CM

## 2020-09-18 DIAGNOSIS — Z95.0 CARDIAC PACEMAKER IN SITU: ICD-10-CM

## 2020-09-18 DIAGNOSIS — Z95.0 CARDIAC PACEMAKER IN SITU: Primary | ICD-10-CM

## 2020-09-18 DIAGNOSIS — I10 ESSENTIAL HYPERTENSION: ICD-10-CM

## 2020-09-18 PROCEDURE — 93280 PM DEVICE PROGR EVAL DUAL: CPT | Performed by: INTERNAL MEDICINE

## 2020-09-18 PROCEDURE — G0463 HOSPITAL OUTPT CLINIC VISIT: HCPCS | Performed by: NURSE PRACTITIONER

## 2020-09-18 PROCEDURE — 99215 OFFICE O/P EST HI 40 MIN: CPT | Performed by: NURSE PRACTITIONER

## 2020-09-18 PROCEDURE — 93005 ELECTROCARDIOGRAM TRACING: CPT | Performed by: NURSE PRACTITIONER

## 2020-09-18 PROCEDURE — 93010 ELECTROCARDIOGRAM REPORT: CPT | Performed by: NURSE PRACTITIONER

## 2020-09-18 PROCEDURE — 93280 PM DEVICE PROGR EVAL DUAL: CPT

## 2020-09-18 RX ORDER — CHOLECALCIFEROL (VITAMIN D3) 125 MCG
CAPSULE ORAL
COMMUNITY
End: 2021-09-03

## 2020-09-18 NOTE — PROGRESS NOTES
Room 2    Visit Vitals  BP (!) 142/78 (BP 1 Location: Left arm, BP Patient Position: Sitting)   Pulse 84   Ht 5' 11\" (1.803 m)   Wt 242 lb (109.8 kg)   SpO2 97%   BMI 33.75 kg/m²       Chest pain: no  Shortness of breath: no  Edema: no  Palpitations, Skipped beats, Rapid heartbeat: no  Dizziness: no    New diagnosis/Surgeries: no    ER/Hospitalizations: no    Refills: NO

## 2020-09-23 NOTE — H&P
[facility]  [address], [place] [address]  [phone]    Admission History and Physical      NAME:  Satish Prieto   :   1950   MRN:  [id number]     PCP:  Cathy Marinelli MD     Date/Time:  3/25/2017         Subjective:     CHIEF COMPLAINT: weakness     HISTORY OF PRESENT ILLNESS:     Mr. Felecia Schulte is a 74 y.o.  male who is admitted with weakness. Mr. Felecia Schulte presented to the Emergency Department this AM complaining of weakness: left arm, sudden onset, 2 days ago, constant, associated with brief slurred speech at the time on onset but this quickly resolved. He does have a history of cervical disease    History obtained from spouse, chart review and the patient.        Past Medical History:   Diagnosis Date    Hypercholesteremia     Hypertension     Obesity (BMI 30-39.9) 3/25/2017        Past Surgical History:   Procedure Laterality Date    HX ORTHOPAEDIC      knees       Social History   Substance Use Topics    Smoking status: Former Smoker    Smokeless tobacco: Not on file    Alcohol use Yes      Comment: social         Family History   Problem Relation Age of Onset    Hypertension Neg Hx     Heart Disease Neg Hx         Allergies   Allergen Reactions    Other Medication Hives     All mycin medication groups/family         Prior to Admission medications    Not on File         Review of Systems:  (bold if positive, if negative)    Gen:  Eyes:  ENT:  CVS:  Pulm:  GI:    :    MS:  Skin:  Psych:  Endo:    Hem:  Renal:    Neuro:  Numbnessweakness          Objective:      VITALS:    Vital signs reviewed; most recent are:    Visit Vitals    /88    Pulse 79    Temp 97.4 °F (36.3 °C)    Resp 19    Ht 6' 1\" (1.854 m)    Wt 108.9 kg (240 lb)    SpO2 95%    BMI 31.66 kg/m2     SpO2 Readings from Last 6 Encounters:   17 95%        No intake or output data in the 24 hours ending 17 1227         Exam:     Physical MD AUGUST Penny P Fam Prac Nurse Msg Pool             Please inform the patient of this incidental finding.  He needs a different CT scan to get a better look at the liver lesion (multiphasic   contrast-enhanced CT of liver).  Please order      From: Luke A Falesch, MD   Sent: 9/23/2020   3:05 PM CDT   To: Abraham Bradley MD, Boyd Gorman MD   Subject: new liver lesion                                 IMPRESSION:     -New irregular posterior liver lesion. Recommend dedicated multiphasic contrast-enhanced CT or MR for further characterization (i.e., metastasis not excluded).     -Previously described pulmonary nodules remain unchanged. The nodule on the left appears to reflect round atelectasis, the one on the right is nonspecific.     -Moderate scattered calcified pleural plaques, presumably related to prior asbestos exposure. Mild-moderate subpleural parenchymal scarring throughout both lungs associated with the pleural thickening/scarring, similar prior exam.    Exam:    Gen: Well-developed, obese, in no acute distress  HEENT:  Pink conjunctivae, PERRL, hearing intact to voice, moist mucous membranes  Neck: Supple, without masses, thyroid non-tender  Resp: No accessory muscle use, clear breath sounds without wheezes rales or rhonchi  Card: No murmurs, normal S1, S2 without thrills, bruits or peripheral edema  Abd:  Soft, non-tender, non-distended, normoactive bowel sounds are present, no palpable organomegaly and no detectable hernias  Lymph:  No cervical or inguinal adenopathy  Musc: No cyanosis or clubbing  Skin: No rashes or ulcers, skin turgor is good  Neuro:  Cranial nerves are grossly intact, 4+/5 RUE all other strength intact, follows commands appropriately  Psych:  Good insight, oriented to person, place and time, alert             Labs:    Recent Labs      03/25/17   1013   WBC  7.9   HGB  17.3*   HCT  51.9*   PLT  241     Recent Labs      03/25/17   1013   NA  139   K  4.2   CL  102   CO2  28   GLU  124*   BUN  16   CREA  1.17   CA  8.8   ALB  3.9   TBILI  0.5   SGOT  18   ALT  21     Lab Results   Component Value Date/Time    Glucose (POC) 113 03/25/2017 10:21 AM     No results for input(s): PH, PCO2, PO2, HCO3, FIO2 in the last 72 hours.   Recent Labs      03/25/17   1023   INR  1.0       Additional testing:  Head CT without acute findings       Assessment/Plan:       Principal Problem:    Weakness (3/25/2017)   - given highly elevated BP and slurred speech, suspicious for cerebral ischemic disease   - Head CT negative but could have small lacunar infarct not seen on CT   - MRI of head   - Neurology consult   - PT/OT/Speech    Active Problems:    High cholesterol (3/25/2017)   - continue statin, was prescribed as primary prevention   - check lipids   - may need dose increased if LDL > 70      HTN (hypertension) (3/25/2017)   - BP highly elevated   - labetalol per Neuro protocol for now   - suspect will need treatment with oral medications at some point   - given improvement of arm numbness with lowering BP, I am concerned about possible cardiac ischemia   - EKG is non-ischemic   - will get echo and serial enzymes   - unlikely to be NSTEMI after 45+ hours with normal troponin on presentation      Obesity (BMI 30-39.9) (3/25/2017)   - counseled on weight loss       Code status:   - patient is FULL CODE      Total time spent with patient: [address] discussed with: Patient, Family and Nursing Staff    Discussed:  Code Status, Care Plan and D/C Planning    Prophylaxis:  Lovenox and SCD's    Probable Disposition:  TBD, await therapy assessments           ___________________________________________________    Attending Physician: Sky Zamudio MD

## 2020-09-29 DIAGNOSIS — I63.9 ACUTE CVA (CEREBROVASCULAR ACCIDENT) (HCC): ICD-10-CM

## 2020-09-29 DIAGNOSIS — R29.810 FACIAL DROOP: ICD-10-CM

## 2020-09-29 RX ORDER — CLOPIDOGREL BISULFATE 75 MG/1
TABLET ORAL
Qty: 90 TAB | Refills: 0 | Status: SHIPPED | OUTPATIENT
Start: 2020-09-29 | End: 2020-10-20 | Stop reason: SDUPTHER

## 2020-09-29 RX ORDER — AMLODIPINE BESYLATE 10 MG/1
TABLET ORAL
Qty: 90 TAB | Refills: 0 | Status: SHIPPED | OUTPATIENT
Start: 2020-09-29 | End: 2020-10-20 | Stop reason: SDUPTHER

## 2020-09-29 RX ORDER — PRAVASTATIN SODIUM 80 MG/1
TABLET ORAL
Qty: 90 TAB | Refills: 0 | Status: SHIPPED | OUTPATIENT
Start: 2020-09-29 | End: 2020-10-20 | Stop reason: SDUPTHER

## 2020-10-20 ENCOUNTER — OFFICE VISIT (OUTPATIENT)
Dept: INTERNAL MEDICINE CLINIC | Age: 70
End: 2020-10-20
Payer: MEDICARE

## 2020-10-20 VITALS
OXYGEN SATURATION: 96 % | BODY MASS INDEX: 33.6 KG/M2 | SYSTOLIC BLOOD PRESSURE: 150 MMHG | DIASTOLIC BLOOD PRESSURE: 80 MMHG | HEIGHT: 71 IN | RESPIRATION RATE: 16 BRPM | HEART RATE: 78 BPM | WEIGHT: 240 LBS

## 2020-10-20 DIAGNOSIS — R97.20 ABNORMAL PSA: ICD-10-CM

## 2020-10-20 DIAGNOSIS — Z00.00 MEDICARE ANNUAL WELLNESS VISIT, SUBSEQUENT: Primary | ICD-10-CM

## 2020-10-20 DIAGNOSIS — I10 HTN, GOAL BELOW 130/80: ICD-10-CM

## 2020-10-20 DIAGNOSIS — I63.9 ACUTE CVA (CEREBROVASCULAR ACCIDENT) (HCC): ICD-10-CM

## 2020-10-20 DIAGNOSIS — E78.5 DYSLIPIDEMIA, GOAL LDL BELOW 70: ICD-10-CM

## 2020-10-20 DIAGNOSIS — R29.810 FACIAL DROOP: ICD-10-CM

## 2020-10-20 LAB
ALBUMIN SERPL-MCNC: 4.1 G/DL (ref 3.5–5)
ALBUMIN/GLOB SERPL: 1.1 {RATIO} (ref 1.1–2.2)
ALP SERPL-CCNC: 103 U/L (ref 45–117)
ALT SERPL-CCNC: 19 U/L (ref 12–78)
ANION GAP SERPL CALC-SCNC: 9 MMOL/L (ref 5–15)
AST SERPL-CCNC: 16 U/L (ref 15–37)
BILIRUB SERPL-MCNC: 0.4 MG/DL (ref 0.2–1)
BUN SERPL-MCNC: 17 MG/DL (ref 6–20)
BUN/CREAT SERPL: 16 (ref 12–20)
CALCIUM SERPL-MCNC: 9.5 MG/DL (ref 8.5–10.1)
CHLORIDE SERPL-SCNC: 102 MMOL/L (ref 97–108)
CHOLEST SERPL-MCNC: 154 MG/DL
CO2 SERPL-SCNC: 29 MMOL/L (ref 21–32)
CREAT SERPL-MCNC: 1.08 MG/DL (ref 0.7–1.3)
GLOBULIN SER CALC-MCNC: 3.7 G/DL (ref 2–4)
GLUCOSE SERPL-MCNC: 98 MG/DL (ref 65–100)
HDLC SERPL-MCNC: 50 MG/DL
HDLC SERPL: 3.1 {RATIO} (ref 0–5)
LDLC SERPL CALC-MCNC: 73 MG/DL (ref 0–100)
LIPID PROFILE,FLP: ABNORMAL
POTASSIUM SERPL-SCNC: 4.9 MMOL/L (ref 3.5–5.1)
PROT SERPL-MCNC: 7.8 G/DL (ref 6.4–8.2)
PSA SERPL-MCNC: 9.2 NG/ML (ref 0.01–4)
SODIUM SERPL-SCNC: 140 MMOL/L (ref 136–145)
TRIGL SERPL-MCNC: 155 MG/DL (ref ?–150)
VLDLC SERPL CALC-MCNC: 31 MG/DL

## 2020-10-20 PROCEDURE — 99214 OFFICE O/P EST MOD 30 MIN: CPT | Performed by: INTERNAL MEDICINE

## 2020-10-20 PROCEDURE — G0438 PPPS, INITIAL VISIT: HCPCS | Performed by: INTERNAL MEDICINE

## 2020-10-20 PROCEDURE — G0463 HOSPITAL OUTPT CLINIC VISIT: HCPCS | Performed by: INTERNAL MEDICINE

## 2020-10-20 RX ORDER — PRAVASTATIN SODIUM 80 MG/1
TABLET ORAL
Qty: 90 TAB | Refills: 1 | Status: SHIPPED | OUTPATIENT
Start: 2020-10-20 | End: 2021-08-05

## 2020-10-20 RX ORDER — AMLODIPINE BESYLATE 10 MG/1
TABLET ORAL
Qty: 90 TAB | Refills: 1 | Status: SHIPPED | OUTPATIENT
Start: 2020-10-20 | End: 2021-06-24

## 2020-10-20 RX ORDER — DICLOFENAC SODIUM 10 MG/G
GEL TOPICAL 4 TIMES DAILY
Qty: 100 G | Refills: 3 | Status: SHIPPED | OUTPATIENT
Start: 2020-10-20 | End: 2021-09-03 | Stop reason: ALTCHOICE

## 2020-10-20 RX ORDER — CLOPIDOGREL BISULFATE 75 MG/1
TABLET ORAL
Qty: 90 TAB | Refills: 1 | Status: SHIPPED | OUTPATIENT
Start: 2020-10-20 | End: 2021-08-06

## 2020-10-20 RX ORDER — IRBESARTAN 300 MG/1
300 TABLET ORAL
Qty: 90 TAB | Refills: 1 | Status: SHIPPED | OUTPATIENT
Start: 2020-10-20 | End: 2021-01-15

## 2020-10-20 NOTE — PATIENT INSTRUCTIONS
Medicare Wellness Visit, Male The best way to live healthy is to have a lifestyle where you eat a well-balanced diet, exercise regularly, limit alcohol use, and quit all forms of tobacco/nicotine, if applicable. Regular preventive services are another way to keep healthy. Preventive services (vaccines, screening tests, monitoring & exams) can help personalize your care plan, which helps you manage your own care. Screening tests can find health problems at the earliest stages, when they are easiest to treat. [facility] follows the current, evidence-based guidelines published by the [facility] Sarah ([facility]) when recommending preventive services for our patients. Because we follow these guidelines, sometimes recommendations change over time as research supports it. (For example, a prostate screening blood test is no longer routinely recommended for men with no symptoms). Of course, you and your doctor may decide to screen more often for some diseases, based on your risk and co-morbidities (chronic disease you are already diagnosed with). Preventive services for you include: - Medicare offers their members a free annual wellness visit, which is time for you and your primary care provider to discuss and plan for your preventive service needs. Take advantage of this benefit every year! 
-All adults over age 72 should receive the recommended pneumonia vaccines. Current USPSTF guidelines recommend a series of two vaccines for the best pneumonia protection.  
-All adults should have a flu vaccine yearly and tetanus vaccine every 10 years. 
-All adults age 48 and older should receive the shingles vaccines (series of two vaccines).       
-All adults age 38-68 who are overweight should have a diabetes screening test once every three years.  
-Other screening tests & preventive services for persons with diabetes include: an eye exam to screen for diabetic retinopathy, a kidney function test, a foot exam, and stricter control over your cholesterol.  
-Cardiovascular screening for adults with routine risk involves an electrocardiogram (ECG) at intervals determined by the provider.  
-Colorectal cancer screening should be done for adults age 54-65 with no increased risk factors for colorectal cancer. There are a number of acceptable methods of screening for this type of cancer. Each test has its own benefits and drawbacks. Discuss with your provider what is most appropriate for you during your annual wellness visit. The different tests include: colonoscopy (considered the best screening method), a fecal occult blood test, a fecal DNA test, and sigmoidoscopy. 
-All adults born between [facility] should be screened once for Hepatitis C. 
-An Abdominal Aortic Aneurysm (AAA) Screening is recommended for men age 73-68 who has ever smoked in their lifetime. Here is a list of your current Health Maintenance items (your personalized list of preventive services) with a due date: 
Health Maintenance Due Topic Date Due  
 Hepatitis C Test  1950  DTaP/Tdap/Td  (1 - Tdap) 07/13/1969  Shingles Vaccine (1 of 2) 07/13/1998  Colon Cancer Stool Test  07/13/1998  Glaucoma Screening   07/13/2013 [address] [place] [address] Annual Well Visit  03/14/2018  Yearly Flu Vaccine (1) 09/01/2020

## 2020-10-20 NOTE — PROGRESS NOTES
HISTORY OF PRESENT ILLNESS  Josefa Lemus is a 65 y.o. male. HPI  Seen for wellness visit and follow up on issues:  1. Hypertension. At home his pressure has been in the 140-150 range. No headaches or dizzy spells. Currently on Losartan 50 and Amlodipine 10. Will change Losartan to Irbesartan 300 for better control. 2. Dyslipidemia, on Pravastatin 80. No myalgias. Due for lipids. 3. He has a history of stroke. Asks if he can stop aspirin and use Aleve. Discussed that aspirin has preventive benefit because of history of stroke and I would avoid Aleve. Wrote for Voltaren Gel for arthritis symptoms. 4. Mildly elevated PSA of 4. Denies symptoms of BPH or UTI. Will recheck and have him see urology if it is escalating. Preventive Care:  Due for Shingrix and flu shot. Review of Systems   Constitutional: Negative for chills, fever and weight loss. HENT: Negative for hearing loss. Respiratory: Negative for cough, shortness of breath and wheezing. Cardiovascular: Negative for chest pain, palpitations, orthopnea, leg swelling and PND. Gastrointestinal: Negative for abdominal pain, heartburn and nausea. Genitourinary: Positive for frequency. Negative for dysuria, flank pain, hematuria and urgency. Musculoskeletal: Negative for falls and myalgias. Neurological: Negative for dizziness and headaches. Psychiatric/Behavioral: Negative for memory loss. Physical Exam  Vitals signs and nursing note reviewed. Constitutional:       Appearance: He is well-developed. HENT:      Head: Normocephalic and atraumatic. Right Ear: Tympanic membrane normal.      Left Ear: Tympanic membrane normal.   Neck:      Musculoskeletal: Normal range of motion and neck supple. Thyroid: No thyromegaly. Vascular: No carotid bruit. Cardiovascular:      Rate and Rhythm: Normal rate and regular rhythm. Heart sounds: Normal heart sounds.    Pulmonary:      Effort: Pulmonary effort is normal. No respiratory distress. Breath sounds: Normal breath sounds. No wheezing or rales. Musculoskeletal:      Right lower leg: No edema. Left lower leg: No edema. Neurological:      Mental Status: He is alert and oriented to person, place, and time. Psychiatric:         Behavior: Behavior normal.         ASSESSMENT and PLAN  Diagnoses and all orders for this visit:    1. Medicare annual wellness visit, subsequent    2. HTN, goal below 130/80  -     diclofenac (VOLTAREN) 1 % gel; Apply  to affected area four (4) times daily. -     irbesartan (AVAPRO) 300 mg tablet; Take 1 Tab by mouth nightly. -     amLODIPine (NORVASC) 10 mg tablet; TAKE ONE TABLET BY MOUTH DAILY    3. Facial droop  -     clopidogreL (PLAVIX) 75 mg tab; TAKE ONE TABLET BY MOUTH DAILY    4. Dyslipidemia, goal LDL below 70  -     METABOLIC PANEL, COMPREHENSIVE; Future  -     LIPID PANEL; Future  -     HEPATITIS C AB; Future  -     pravastatin (PRAVACHOL) 80 mg tablet; TAKE ONE TABLET BY MOUTH DAILY    5. Acute CVA (cerebrovascular accident) ([address]  -     clopidogreL (PLAVIX) 75 mg tab; TAKE ONE TABLET BY MOUTH DAILY    6. Abnormal PSA  -     PSA, DIAGNOSTIC (PROSTATE SPECIFIC AG); Future    If higher than 4 would send to urology  Call me in 1 mo  With bp report  This is the Subsequent Medicare Annual Wellness Exam, performed 12 months or more after the Initial AWV or the last Subsequent AWV    I have reviewed the patient's medical history in detail and updated the computerized patient record. History     Patient Active Problem List   Diagnosis Code    High cholesterol E78.00    HTN (hypertension) I10    Obesity (BMI 30-39. 9) E66.9    Acute CVA (cerebrovascular accident) ([address] I63.9    Heart block I45.9    Intracranial atherosclerosis I67.2     Past Medical History:   Diagnosis Date    Hypercholesteremia     Hypertension     Migraines     Obesity (BMI 30-39.9) 3/25/2017    Stroke [facility])       Past Surgical History: Procedure Laterality Date    HX ORTHOPAEDIC      knees     Current Outpatient Medications   Medication Sig Dispense Refill    diclofenac (VOLTAREN) 1 % gel Apply  to affected area four (4) times daily. 100 g 3    irbesartan (AVAPRO) 300 mg tablet Take 1 Tab by mouth nightly. 90 Tab 1    pravastatin (PRAVACHOL) 80 mg tablet TAKE ONE TABLET BY MOUTH DAILY 90 Tab 1    clopidogreL (PLAVIX) 75 mg tab TAKE ONE TABLET BY MOUTH DAILY 90 Tab 1    amLODIPine (NORVASC) 10 mg tablet TAKE ONE TABLET BY MOUTH DAILY 90 Tab 1    naproxen sodium (Aleve) 220 mg cap Take  by mouth.  aspirin 81 mg chewable tablet Take 1 Tab by mouth daily. Indications: Acute Coronary Syndrome       Allergies   Allergen Reactions    Ace Inhibitors Rash    Erythromycin Hives     Pt states allergic to all mycins     Lipitor [Atorvastatin] Rash    Macrolide Antibiotics Hives    Niacin Rash    Other Medication Hives     All mycin medication groups/family        Family History   Problem Relation Age of Onset    Stroke Father    Sue Lutz Migraines Sister     Stroke Mother     Lung Cancer Brother         smoker    Hypertension Neg Hx     Heart Disease Neg Hx      Social History     Tobacco Use    Smoking status: Former Smoker     Last attempt to quit:      Years since quittin.8    Smokeless tobacco: Never Used   Substance Use Topics    Alcohol use:  Yes     Alcohol/week: 2.0 standard drinks     Types: 2 Shots of liquor per week     Comment: occ       Depression Risk Factor Screening:     3 most recent PHQ Screens 10/20/2020   Little interest or pleasure in doing things Not at all   Feeling down, depressed, irritable, or hopeless Not at all   Total Score PHQ 2 0       Alcohol Risk Screen   Do you average more than 1 drink per night or more than 7 drinks a week: No    In the past three months have you have had more than 4 drinks containing alcohol on one occasion: No        Functional Ability and Level of Safety:   Hearing: Hearing is good. Activities of Daily Living: The home contains: no safety equipment. Patient does total self care     Ambulation: with no difficulty     Fall Risk:  Fall Risk Assessment, last 12 mths 10/20/2020   Able to walk? Yes   Fall in past 12 months? No     Abuse Screen:  Patient is not abused       Cognitive Screening   Has your family/caregiver stated any concerns about your memory: no         Patient Care Team   Patient Care Team:  Radha Browne MD as PCP - General (Internal Medicine)  Radha Browne MD as PCP - [facility] Provider  Horace Hill MD as Physician (Cardiology)  Dev Ward RN as Ambulatory Care Manager (Cardiology)    Assessment/Plan   Education and counseling provided:  Are appropriate based on today's review and evaluation  Influenza Vaccine  Prostate cancer screening tests (PSA, covered annually)  Screening for glaucoma    Diagnoses and all orders for this visit:    1. Medicare annual wellness visit, subsequent    2. HTN, goal below 130/80  -     diclofenac (VOLTAREN) 1 % gel; Apply  to affected area four (4) times daily. -     irbesartan (AVAPRO) 300 mg tablet; Take 1 Tab by mouth nightly. -     amLODIPine (NORVASC) 10 mg tablet; TAKE ONE TABLET BY MOUTH DAILY    3. Facial droop  -     clopidogreL (PLAVIX) 75 mg tab; TAKE ONE TABLET BY MOUTH DAILY    4. Dyslipidemia, goal LDL below 70  -     METABOLIC PANEL, COMPREHENSIVE; Future  -     LIPID PANEL; Future  -     HEPATITIS C AB; Future  -     pravastatin (PRAVACHOL) 80 mg tablet; TAKE ONE TABLET BY MOUTH DAILY    5. Acute CVA (cerebrovascular accident) ([address]  -     clopidogreL (PLAVIX) 75 mg tab; TAKE ONE TABLET BY MOUTH DAILY    6. Abnormal PSA  -     PSA, DIAGNOSTIC (PROSTATE SPECIFIC AG);  Future        Health Maintenance Due   Topic Date Due    Hepatitis C Screening  1950    DTaP/Tdap/Td series (1 - Tdap) 07/13/1969    Shingrix Vaccine Age 50> (1 of 2) 07/13/1998    FOBT Q1Y Age 50-75  07/13/1998    GLAUCOMA SCREENING Q2Y  07/13/2013    Medicare Yearly Exam  03/14/2018    Flu Vaccine (1) 09/01/2020

## 2020-10-21 LAB
HCV AB SERPL QL IA: NONREACTIVE
HCV COMMENT,HCGAC: NORMAL

## 2020-11-23 ENCOUNTER — TELEPHONE (OUTPATIENT)
Dept: INTERNAL MEDICINE CLINIC | Age: 70
End: 2020-11-23

## 2020-11-23 NOTE — TELEPHONE ENCOUNTER
Returned call to patient. He reports since adjusting hisbp medication last month his bp has been avg abut 140/80. He clementine cardiac symptoms. Current regimen is Amlodipine 10 mg & Irbesartan 300 mg daily. He was told to check-in with his bp readings. Please advise.

## 2020-11-23 NOTE — TELEPHONE ENCOUNTER
Patient called to advise blood pressure readings have been running 140/80 range. Patient request PCP advise if medication adjustment is needed. Patient also reports DR. Kim mcgrath/ [facility] PSA results are 3.8. Patient states results drawn at  practice location were 9.5 Patient states unsure if testing through practice were correct. Please call patient to advise.  [phone]

## 2020-11-23 NOTE — TELEPHONE ENCOUNTER
So these bp readings are better than t he 140-150 range and I would advise cont current meds and cont to track at home and appt with  Me in 3 months  Also  Notify him psa levels can fluctuate so not strange that levels are so different-glad dr Ni Rivera is following him

## 2020-11-23 NOTE — TELEPHONE ENCOUNTER
Patient notified of PCP's advice to cont his current bp regimen & follow up in 3 months as planned. Advised he cont follow up with urology for the PSA. Patient voiced understanding.

## 2020-12-29 ENCOUNTER — OFFICE VISIT (OUTPATIENT)
Dept: CARDIOLOGY CLINIC | Age: 70
End: 2020-12-29
Payer: MEDICARE

## 2020-12-29 DIAGNOSIS — Z95.0 CARDIAC PACEMAKER IN SITU: Primary | ICD-10-CM

## 2020-12-29 PROCEDURE — 93294 REM INTERROG EVL PM/LDLS PM: CPT | Performed by: INTERNAL MEDICINE

## 2021-01-15 ENCOUNTER — PATIENT MESSAGE (OUTPATIENT)
Dept: INTERNAL MEDICINE CLINIC | Age: 71
End: 2021-01-15

## 2021-01-15 DIAGNOSIS — I10 HTN, GOAL BELOW 130/80: ICD-10-CM

## 2021-01-15 RX ORDER — IRBESARTAN 300 MG/1
TABLET ORAL
Qty: 90 TAB | Refills: 0 | Status: SHIPPED | OUTPATIENT
Start: 2021-01-15 | End: 2021-03-30 | Stop reason: SDUPTHER

## 2021-03-30 ENCOUNTER — OFFICE VISIT (OUTPATIENT)
Dept: INTERNAL MEDICINE CLINIC | Age: 71
End: 2021-03-30
Payer: MEDICARE

## 2021-03-30 VITALS
TEMPERATURE: 98.2 F | DIASTOLIC BLOOD PRESSURE: 76 MMHG | OXYGEN SATURATION: 97 % | HEART RATE: 86 BPM | BODY MASS INDEX: 33.52 KG/M2 | WEIGHT: 239.4 LBS | RESPIRATION RATE: 17 BRPM | HEIGHT: 71 IN | SYSTOLIC BLOOD PRESSURE: 138 MMHG

## 2021-03-30 DIAGNOSIS — Z79.01 CHRONIC ANTICOAGULATION: ICD-10-CM

## 2021-03-30 DIAGNOSIS — E78.5 DYSLIPIDEMIA, GOAL LDL BELOW 100: ICD-10-CM

## 2021-03-30 DIAGNOSIS — I49.5 SSS (SICK SINUS SYNDROME) (HCC): ICD-10-CM

## 2021-03-30 DIAGNOSIS — I10 HTN, GOAL BELOW 130/80: Primary | ICD-10-CM

## 2021-03-30 DIAGNOSIS — R97.20 ABNORMAL PSA: ICD-10-CM

## 2021-03-30 DIAGNOSIS — Z95.0 S/P CARDIAC PACEMAKER PROCEDURE: ICD-10-CM

## 2021-03-30 LAB
ALBUMIN SERPL-MCNC: 4.1 G/DL (ref 3.5–5)
ALBUMIN/GLOB SERPL: 1 {RATIO} (ref 1.1–2.2)
ALP SERPL-CCNC: 112 U/L (ref 45–117)
ALT SERPL-CCNC: 24 U/L (ref 12–78)
ANION GAP SERPL CALC-SCNC: 5 MMOL/L (ref 5–15)
AST SERPL-CCNC: 16 U/L (ref 15–37)
BILIRUB SERPL-MCNC: 0.5 MG/DL (ref 0.2–1)
BUN SERPL-MCNC: 20 MG/DL (ref 6–20)
BUN/CREAT SERPL: 21 (ref 12–20)
CALCIUM SERPL-MCNC: 9.2 MG/DL (ref 8.5–10.1)
CHLORIDE SERPL-SCNC: 103 MMOL/L (ref 97–108)
CHOLEST SERPL-MCNC: 137 MG/DL
CO2 SERPL-SCNC: 30 MMOL/L (ref 21–32)
CREAT SERPL-MCNC: 0.94 MG/DL (ref 0.7–1.3)
GLOBULIN SER CALC-MCNC: 4 G/DL (ref 2–4)
GLUCOSE SERPL-MCNC: 102 MG/DL (ref 65–100)
HDLC SERPL-MCNC: 50 MG/DL
HDLC SERPL: 2.7 {RATIO} (ref 0–5)
LDLC SERPL CALC-MCNC: 71 MG/DL (ref 0–100)
LIPID PROFILE,FLP: NORMAL
POTASSIUM SERPL-SCNC: 4.3 MMOL/L (ref 3.5–5.1)
PROT SERPL-MCNC: 8.1 G/DL (ref 6.4–8.2)
SODIUM SERPL-SCNC: 138 MMOL/L (ref 136–145)
TRIGL SERPL-MCNC: 80 MG/DL (ref ?–150)
VLDLC SERPL CALC-MCNC: 16 MG/DL

## 2021-03-30 PROCEDURE — 1101F PT FALLS ASSESS-DOCD LE1/YR: CPT | Performed by: INTERNAL MEDICINE

## 2021-03-30 PROCEDURE — G8752 SYS BP LESS 140: HCPCS | Performed by: INTERNAL MEDICINE

## 2021-03-30 PROCEDURE — G8427 DOCREV CUR MEDS BY ELIG CLIN: HCPCS | Performed by: INTERNAL MEDICINE

## 2021-03-30 PROCEDURE — G8510 SCR DEP NEG, NO PLAN REQD: HCPCS | Performed by: INTERNAL MEDICINE

## 2021-03-30 PROCEDURE — 3017F COLORECTAL CA SCREEN DOC REV: CPT | Performed by: INTERNAL MEDICINE

## 2021-03-30 PROCEDURE — G8754 DIAS BP LESS 90: HCPCS | Performed by: INTERNAL MEDICINE

## 2021-03-30 PROCEDURE — G8536 NO DOC ELDER MAL SCRN: HCPCS | Performed by: INTERNAL MEDICINE

## 2021-03-30 PROCEDURE — 99214 OFFICE O/P EST MOD 30 MIN: CPT | Performed by: INTERNAL MEDICINE

## 2021-03-30 PROCEDURE — G8417 CALC BMI ABV UP PARAM F/U: HCPCS | Performed by: INTERNAL MEDICINE

## 2021-03-30 PROCEDURE — G0463 HOSPITAL OUTPT CLINIC VISIT: HCPCS | Performed by: INTERNAL MEDICINE

## 2021-03-30 RX ORDER — IRBESARTAN 300 MG/1
TABLET ORAL
Qty: 90 TAB | Refills: 2 | Status: SHIPPED | OUTPATIENT
Start: 2021-03-30 | End: 2022-03-20

## 2021-03-30 NOTE — PROGRESS NOTES
HISTORY OF PRESENT ILLNESS  Ryan Soares is a 65 y.o. male. HPI  Six month follow up on hypertension. At last visit we switched from Losartan to Irbesartan 300. He has tolerated it well. Remains on Amlodipine. Blood pressure was in the 150/80 range, now in the 130-140 range. He started a keto diet and is committed to weight loss. I think this will help his blood pressure. Dyslipidemia, on Pravastatin. No myalgias, no cardiac symptoms. Elevated PSA. Did see Dr. Ivelisse Simmons, who ultimately decided that he did not need a biopsy. Repeat PSA was reportedly 3.5. He is encouraged to follow up with Dr. Ivelisse Simmons. He has had one of the [employer] vaccines. Review of Systems   Constitutional: Negative for chills, fever and weight loss. Respiratory: Negative for cough, shortness of breath and wheezing. Cardiovascular: Negative for chest pain, palpitations, orthopnea, leg swelling and PND. Gastrointestinal: Negative for abdominal pain, diarrhea, heartburn and nausea. Musculoskeletal: Negative for myalgias. Neurological: Negative for dizziness and headaches. Physical Exam  Vitals signs and nursing note reviewed. Constitutional:       Appearance: He is well-developed. HENT:      Head: Normocephalic and atraumatic. Neck:      Musculoskeletal: Normal range of motion and neck supple. Thyroid: No thyromegaly. Vascular: No carotid bruit. Cardiovascular:      Rate and Rhythm: Normal rate and regular rhythm. Heart sounds: Normal heart sounds. Pulmonary:      Effort: No respiratory distress. Breath sounds: Normal breath sounds. No wheezing or rales. Musculoskeletal:      Right lower leg: No edema. Left lower leg: No edema. Neurological:      Mental Status: He is alert and oriented to person, place, and time.    Psychiatric:         Behavior: Behavior normal.         ASSESSMENT and PLAN  Diagnoses and all orders for this visit:    1. HTN, goal below 130/80  -   irbesartan (AVAPRO) 300 mg tablet; TAKE ONE TABLET BY MOUTH ONCE NIGHTLY    2. SSS (sick sinus syndrome) (HCC)    3. Abnormal PSA  Repeat with dr villegas better encouraged urology follow up  4. Chronic anticoagulation    5. S/P cardiac pacemaker procedure    6. Dyslipidemia, goal LDL below 100  -     METABOLIC PANEL, COMPREHENSIVE; Future  -     LIPID PANEL; Future  appt in 6mo consider adding hctz if bp not down    lab results and schedule of future lab studies reviewed with patient  reviewed diet, exercise and weight control

## 2021-04-06 ENCOUNTER — OFFICE VISIT (OUTPATIENT)
Dept: CARDIOLOGY CLINIC | Age: 71
End: 2021-04-06
Payer: MEDICARE

## 2021-04-06 DIAGNOSIS — Z95.0 CARDIAC PACEMAKER IN SITU: Primary | ICD-10-CM

## 2021-04-06 PROCEDURE — 93294 REM INTERROG EVL PM/LDLS PM: CPT | Performed by: INTERNAL MEDICINE

## 2021-04-06 NOTE — LETTER
2021 9:44 AM    Mr. Shona Alvarez  [address] [id number]        Dear Patient,    We have received your recent remote monitor check of your implanted device scheduled on  2021. Your remaining estimated battery life is 7.5 years  and your device is working normally & appropriately. Your next remote monitor check is scheduled for 2021. Your next clinic/office check is scheduled for  Friday, 10/15/2021 at [address] [place] [address] Road will have your device checked then see the provider. Please bring a complete list of your medicaions with strengths and dosages to this appointment. Please be advised that due to [address] there is NO  PARKING. We kindly request that you keep this in mind when planning your arrival time. Thank you for helping us keep timely appointments for everyone. If you have any questions, please call the Pacemaker/ICD clinic at the [facility] location at [phone]. Sincerely,    Santiago MROENON, RN  Cardiac Device Clinic Coordinator  [facility].[address]  [address], [address] [facility]  [phone]

## 2021-06-23 DIAGNOSIS — I10 HTN, GOAL BELOW 130/80: ICD-10-CM

## 2021-06-24 RX ORDER — AMLODIPINE BESYLATE 10 MG/1
TABLET ORAL
Qty: 90 TABLET | Refills: 0 | Status: SHIPPED | OUTPATIENT
Start: 2021-06-24 | End: 2021-09-22

## 2021-07-20 ENCOUNTER — OFFICE VISIT (OUTPATIENT)
Dept: CARDIOLOGY CLINIC | Age: 71
End: 2021-07-20
Payer: MEDICARE

## 2021-07-20 DIAGNOSIS — Z95.0 CARDIAC PACEMAKER IN SITU: Primary | ICD-10-CM

## 2021-07-20 PROCEDURE — 93296 REM INTERROG EVL PM/IDS: CPT | Performed by: NURSE PRACTITIONER

## 2021-07-20 NOTE — LETTER
7/26/2021 2:05 PM    Mr. Jorge Dumont  [address] [id number]      Dear Mr. Jorge Dumont,    We have received your recent remote monitor check of your implanted device scheduled on 7/20/2021. Your remaining estimated battery life is 7 years and your device is working normally & appropriately. Your next remote monitor check is scheduled for  1/27/2022. You do not need to report to the office as this occurs during the night from your home. Your next clinic/office check is scheduled for Friday, 10/15/2021 at 9:00 am.  You will have your device checked then see the provider. Please bring a complete list of your medications with strengths and dosages to this appointment. Please plan to arrive 10 minutes early to allow time for check-in. [employer] parking has resumed again and is available Monday through Friday from 7:00 am to 5:00 pm.    If you have any questions, please call the Pacemaker/ICD clinic at the [address] [place] [address] location at [phone]. We appreciate you staying remotely connected. Sincerely,    Juanpablo MORENON, RN  Cardiac Device Clinic Coordinator  [facility].[address]  [address], [address] [facility]  [phone]

## 2021-08-04 DIAGNOSIS — E78.5 DYSLIPIDEMIA, GOAL LDL BELOW 70: ICD-10-CM

## 2021-08-05 RX ORDER — PRAVASTATIN SODIUM 80 MG/1
TABLET ORAL
Qty: 90 TABLET | Refills: 0 | Status: SHIPPED | OUTPATIENT
Start: 2021-08-05 | End: 2021-10-25

## 2021-08-06 DIAGNOSIS — R29.810 FACIAL DROOP: ICD-10-CM

## 2021-08-06 DIAGNOSIS — I63.9 ACUTE CVA (CEREBROVASCULAR ACCIDENT) (HCC): ICD-10-CM

## 2021-08-06 RX ORDER — CLOPIDOGREL BISULFATE 75 MG/1
TABLET ORAL
Qty: 90 TABLET | Refills: 0 | Status: SHIPPED | OUTPATIENT
Start: 2021-08-06 | End: 2021-11-02

## 2021-08-31 ENCOUNTER — TELEPHONE (OUTPATIENT)
Dept: INTERNAL MEDICINE CLINIC | Age: 71
End: 2021-08-31

## 2021-08-31 NOTE — TELEPHONE ENCOUNTER
Patient has a virtual appointment for this Friday but would like to know if there is anything he can be doing between now and then to help alleviate his symptoms (dizziness). Please call back and advise.

## 2021-09-01 NOTE — TELEPHONE ENCOUNTER
Returned call to patient. He notes a few weeks of dizziness. He has read one of his meds can cause dizziness. He wants to review his meds at his upcoming appt. He states his bp has been stable, yesterday's reading was 138/70. He denies cardiac symptoms, headaches, ear pain & fullness. Advised patient to keep a log of his bp to have available for his vv appt this Friday. Patient voiced understanding.

## 2021-09-03 ENCOUNTER — VIRTUAL VISIT (OUTPATIENT)
Dept: INTERNAL MEDICINE CLINIC | Age: 71
End: 2021-09-03
Payer: MEDICARE

## 2021-09-03 DIAGNOSIS — I10 HTN, GOAL BELOW 130/80: ICD-10-CM

## 2021-09-03 DIAGNOSIS — Z79.01 CHRONIC ANTICOAGULATION: ICD-10-CM

## 2021-09-03 DIAGNOSIS — R42 DIZZINESS: Primary | ICD-10-CM

## 2021-09-03 PROCEDURE — 1101F PT FALLS ASSESS-DOCD LE1/YR: CPT | Performed by: INTERNAL MEDICINE

## 2021-09-03 PROCEDURE — G8432 DEP SCR NOT DOC, RNG: HCPCS | Performed by: INTERNAL MEDICINE

## 2021-09-03 PROCEDURE — 99214 OFFICE O/P EST MOD 30 MIN: CPT | Performed by: INTERNAL MEDICINE

## 2021-09-03 PROCEDURE — G8756 NO BP MEASURE DOC: HCPCS | Performed by: INTERNAL MEDICINE

## 2021-09-03 PROCEDURE — G8417 CALC BMI ABV UP PARAM F/U: HCPCS | Performed by: INTERNAL MEDICINE

## 2021-09-03 PROCEDURE — G0463 HOSPITAL OUTPT CLINIC VISIT: HCPCS | Performed by: INTERNAL MEDICINE

## 2021-09-03 PROCEDURE — G8536 NO DOC ELDER MAL SCRN: HCPCS | Performed by: INTERNAL MEDICINE

## 2021-09-03 PROCEDURE — 3017F COLORECTAL CA SCREEN DOC REV: CPT | Performed by: INTERNAL MEDICINE

## 2021-09-03 PROCEDURE — G8427 DOCREV CUR MEDS BY ELIG CLIN: HCPCS | Performed by: INTERNAL MEDICINE

## 2021-09-03 RX ORDER — MECLIZINE HYDROCHLORIDE 25 MG/1
25 TABLET ORAL
Qty: 25 TABLET | Refills: 0 | Status: SHIPPED | OUTPATIENT
Start: 2021-09-03 | End: 2021-09-13

## 2021-09-03 RX ORDER — FLUTICASONE PROPIONATE 50 MCG
2 SPRAY, SUSPENSION (ML) NASAL DAILY
Qty: 1 EACH | Refills: 2 | Status: SHIPPED | OUTPATIENT
Start: 2021-09-03

## 2021-09-03 NOTE — PROGRESS NOTES
Consent: Jorge Dumont, who was seen by synchronous (real-time) audio-video technology, and/or his healthcare decision maker, is aware that this patient-initiated, Telehealth encounter on 9/3/2021 is a billable service, with coverage as determined by his insurance carrier. He is aware that he may receive a bill and has provided verbal consent to proceed: YES  712  Subjective:   Jorge Dumont is a 66 y.o. male who was seen for Dizziness      Describes 3 days of feeling dizzy-worse when gets up from bed or from sitting. Not when walking up stairs or during day. Did vomit on first day none since. No ha no fevers no change in speech. No sign of gi bleed -no cp. Did stop his avapro for 2 days concerned this was cause but no impact and I advised resume med -bp has not been too low or too  High generally in 130/70 range. Current Outpatient Medications   Medication Sig    meclizine (ANTIVERT) 25 mg tablet Take 1 Tablet by mouth three (3) times daily as needed for Dizziness for up to 10 days.  fluticasone propionate (FLONASE) 50 mcg/actuation nasal spray 2 Sprays by Both Nostrils route daily.  clopidogreL (PLAVIX) 75 mg tab TAKE ONE TABLET BY MOUTH DAILY    pravastatin (PRAVACHOL) 80 mg tablet TAKE ONE TABLET BY MOUTH DAILY    amLODIPine (NORVASC) 10 mg tablet TAKE ONE TABLET BY MOUTH DAILY    irbesartan (AVAPRO) 300 mg tablet TAKE ONE TABLET BY MOUTH ONCE NIGHTLY    aspirin 81 mg chewable tablet Take 1 Tab by mouth daily. Indications: Acute Coronary Syndrome     No current facility-administered medications for this visit.        Allergies   Allergen Reactions    Ace Inhibitors Rash    Erythromycin Hives     Pt states allergic to all mycins     Lipitor [Atorvastatin] Rash    Macrolide Antibiotics Hives    Niacin Rash    Other Medication Hives     All mycin medication groups/family        Past Medical History:   Diagnosis Date    Hypercholesteremia     Hypertension     Migraines     Obesity (BMI 30-39.9) 3/25/2017    S/P cardiac pacemaker procedure 3/30/2021    For heart block dr dean 2018    Stroke [facility])        ROS  All other systems reviewed and negative, unless mentioned in HPI    Objective:   Vital Signs: (As obtained by patient/caregiver at home)  There were no vitals taken for this visit. [INSTRUCTIONS:  \"[x]\" Indicates a positive item  \"[]\" Indicates a negative item  -- DELETE ALL ITEMS NOT EXAMINED]    Constitutional: [x] Appears well-developed and well-nourished [x] No apparent distress      [] Abnormal -     Mental status: [x] Alert and awake  [x] Oriented to person/place/time [x] Able to follow commands    [] Abnormal -     Eyes:   EOM    [x]  Normal    [] Abnormal -   Sclera  [x]  Normal    [] Abnormal -          Discharge [x]  None visible   [] Abnormal -     HENT: [x] Normocephalic, atraumatic  [] Abnormal -       External Ears [x] Normal  [] Abnormal -    Neck: [x] No visualized mass [] Abnormal -     Pulmonary/Chest: [x] Respiratory effort normal   [x] No visualized signs of difficulty breathing or respiratory distress        [] Abnormal -      Musculoskeletal:            [x] Normal range of motion of neck        [] Abnormal -     Neurological:        [x] No Facial Asymmetry (Cranial nerve 7 motor function) (limited exam due to video visit)          [x] No gaze palsy        [] Abnormal -          Skin:        [x] No significant exanthematous lesions or discoloration noted on facial skin         [] Abnormal -            Psychiatric:       [x] Normal Affect [] Abnormal -           Other pertinent observable physical exam findings:-        Assessment & Plan:   Diagnoses and all orders for this visit:    1. Dizziness  -     meclizine (ANTIVERT) 25 mg tablet; Take 1 Tablet by mouth three (3) times daily as needed for Dizziness for up to 10 days. -     fluticasone propionate (FLONASE) 50 mcg/actuation nasal spray; 2 Sprays by Both Nostrils route daily.     2. HTN, goal below 130/80  Well controlled encouraged not to stop the  avapro as working for him  3. Chronic anticoagulation    Discussed sxs of gi bleed-none currently  Discussed appt if not better in 10 days and urgent eval if any other neuro sxs develop  Suspect inner ear from history        We discussed the expected course, resolution and complications of the diagnosis(es) in detail. Medication risks, benefits, costs, interactions, and alternatives were discussed as indicated. I advised him to contact the office if his condition worsens, changes or fails to improve as anticipated. He expressed understanding with the diagnosis(es) and plan. Wendy Cervantes is a 66 y.o. male being evaluated by a video visit encounter for concerns as above. A caregiver was present when appropriate. Due to this being a TeleHealth encounter (During EINJW-84 public health emergency), evaluation of the following organ systems was limited: Vitals/Constitutional/EENT/Resp/CV/GI//MS/Neuro/Skin/Heme-Lymph-Imm. Pursuant to the emergency declaration under the [facility], 1135 waiver authority and the [employer] Act, this Virtual  Visit was conducted, with patient's (and/or legal guardian's) consent, to reduce the patient's risk of exposure to COVID-19 and provide necessary medical care. Services were provided through a video synchronous discussion virtually to substitute for in-person clinic visit. Patient and provider were located at their individual homes.

## 2021-09-15 ENCOUNTER — OFFICE VISIT (OUTPATIENT)
Dept: INTERNAL MEDICINE CLINIC | Age: 71
End: 2021-09-15
Payer: MEDICARE

## 2021-09-15 ENCOUNTER — TELEPHONE (OUTPATIENT)
Dept: INTERNAL MEDICINE CLINIC | Age: 71
End: 2021-09-15

## 2021-09-15 VITALS
RESPIRATION RATE: 16 BRPM | HEIGHT: 71 IN | OXYGEN SATURATION: 95 % | TEMPERATURE: 98 F | HEART RATE: 94 BPM | DIASTOLIC BLOOD PRESSURE: 74 MMHG | SYSTOLIC BLOOD PRESSURE: 124 MMHG | BODY MASS INDEX: 32.53 KG/M2 | WEIGHT: 232.4 LBS

## 2021-09-15 DIAGNOSIS — M26.621 TMJ TENDERNESS, RIGHT: Primary | ICD-10-CM

## 2021-09-15 PROCEDURE — G8417 CALC BMI ABV UP PARAM F/U: HCPCS | Performed by: INTERNAL MEDICINE

## 2021-09-15 PROCEDURE — 3017F COLORECTAL CA SCREEN DOC REV: CPT | Performed by: INTERNAL MEDICINE

## 2021-09-15 PROCEDURE — G8754 DIAS BP LESS 90: HCPCS | Performed by: INTERNAL MEDICINE

## 2021-09-15 PROCEDURE — G8432 DEP SCR NOT DOC, RNG: HCPCS | Performed by: INTERNAL MEDICINE

## 2021-09-15 PROCEDURE — 1101F PT FALLS ASSESS-DOCD LE1/YR: CPT | Performed by: INTERNAL MEDICINE

## 2021-09-15 PROCEDURE — 99213 OFFICE O/P EST LOW 20 MIN: CPT | Performed by: INTERNAL MEDICINE

## 2021-09-15 PROCEDURE — G0463 HOSPITAL OUTPT CLINIC VISIT: HCPCS | Performed by: INTERNAL MEDICINE

## 2021-09-15 PROCEDURE — G8536 NO DOC ELDER MAL SCRN: HCPCS | Performed by: INTERNAL MEDICINE

## 2021-09-15 PROCEDURE — G8752 SYS BP LESS 140: HCPCS | Performed by: INTERNAL MEDICINE

## 2021-09-15 PROCEDURE — G8427 DOCREV CUR MEDS BY ELIG CLIN: HCPCS | Performed by: INTERNAL MEDICINE

## 2021-09-15 RX ORDER — METHYLPREDNISOLONE 4 MG/1
TABLET ORAL
Qty: 1 DOSE PACK | Refills: 0 | Status: SHIPPED | OUTPATIENT
Start: 2021-09-15 | End: 2021-10-15

## 2021-09-15 NOTE — TELEPHONE ENCOUNTER
Patient is calling saying he has tried the medication for atleast ten days now, still feeling a little dizzy so wants to speak with Dr Erin Angulo about his next steps. He also is still having some ear pain he would like to discuss. Please call patient when you have a chance.

## 2021-09-15 NOTE — TELEPHONE ENCOUNTER
Patient states he is still experiencing dizziness despite the medication prescribed & having some ear pain. He denies fevers, chills, sore throat & headaches. Suggested an in-person visit today for further eval. Patient agreed & scheduled for today at 3:00.

## 2021-09-15 NOTE — PROGRESS NOTES
HISTORY OF PRESENT ILLNESS  Horacio Chao is a 66 y.o. male. HPI  Seen on September 3rd with dizziness. This resolved with Meclizine, but he still has discomfort in right ear. It is not sharp. He notes some clicking sounds when he opens his jaw. Denies fevers, chills, purulent nasal drainage, shortness of breath or headaches. He is not having referred pain from chest.      Review of Systems   HENT: Positive for ear pain. Negative for congestion, ear discharge, hearing loss, nosebleeds, sinus pain, sore throat and tinnitus. Neurological: Negative for dizziness, sensory change and headaches. Physical Exam  Vitals and nursing note reviewed. Constitutional:       Appearance: He is well-developed. HENT:      Head: Normocephalic. Comments: Tender over right tmj area     Right Ear: Tympanic membrane, ear canal and external ear normal.      Left Ear: Tympanic membrane, ear canal and external ear normal.      Nose: Nose normal.      Right Sinus: No maxillary sinus tenderness or frontal sinus tenderness. Left Sinus: No maxillary sinus tenderness or frontal sinus tenderness. Mouth/Throat:      Pharynx: No oropharyngeal exudate. Eyes:      General:         Right eye: No discharge. Left eye: No discharge. Conjunctiva/sclera: Conjunctivae normal.      Pupils: Pupils are equal, round, and reactive to light. Cardiovascular:      Rate and Rhythm: Normal rate and regular rhythm. Heart sounds: Normal heart sounds. Pulmonary:      Effort: No respiratory distress. Breath sounds: Normal breath sounds. No wheezing or rales. Musculoskeletal:      Cervical back: Normal range of motion and neck supple. Lymphadenopathy:      Cervical: No cervical adenopathy. ASSESSMENT and PLAN  Diagnoses and all orders for this visit:    1.  TMJ tenderness, right  -     methylPREDNISolone (MEDROL DOSEPACK) 4 mg tablet; Per pack    Discussed seeing  Dentist for    Follow up if signs and symptoms worsen or change. After hours number given.

## 2021-09-21 DIAGNOSIS — I10 HTN, GOAL BELOW 130/80: ICD-10-CM

## 2021-09-22 RX ORDER — AMLODIPINE BESYLATE 10 MG/1
TABLET ORAL
Qty: 90 TABLET | Refills: 1 | Status: SHIPPED | OUTPATIENT
Start: 2021-09-22 | End: 2022-01-18

## 2021-10-15 ENCOUNTER — OFFICE VISIT (OUTPATIENT)
Dept: CARDIOLOGY CLINIC | Age: 71
End: 2021-10-15
Payer: MEDICARE

## 2021-10-15 ENCOUNTER — CLINICAL SUPPORT (OUTPATIENT)
Dept: CARDIOLOGY CLINIC | Age: 71
End: 2021-10-15
Payer: MEDICARE

## 2021-10-15 VITALS
SYSTOLIC BLOOD PRESSURE: 144 MMHG | WEIGHT: 235.4 LBS | HEART RATE: 74 BPM | BODY MASS INDEX: 32.96 KG/M2 | DIASTOLIC BLOOD PRESSURE: 64 MMHG | RESPIRATION RATE: 16 BRPM | HEIGHT: 71 IN | OXYGEN SATURATION: 98 %

## 2021-10-15 DIAGNOSIS — I63.9 ACUTE CVA (CEREBROVASCULAR ACCIDENT) (HCC): ICD-10-CM

## 2021-10-15 DIAGNOSIS — Z95.0 CARDIAC PACEMAKER IN SITU: Primary | ICD-10-CM

## 2021-10-15 DIAGNOSIS — I10 ESSENTIAL HYPERTENSION: ICD-10-CM

## 2021-10-15 DIAGNOSIS — I49.5 SSS (SICK SINUS SYNDROME) (HCC): ICD-10-CM

## 2021-10-15 DIAGNOSIS — R00.1 BRADYCARDIA: Primary | ICD-10-CM

## 2021-10-15 PROCEDURE — G0463 HOSPITAL OUTPT CLINIC VISIT: HCPCS | Performed by: NURSE PRACTITIONER

## 2021-10-15 PROCEDURE — 1101F PT FALLS ASSESS-DOCD LE1/YR: CPT | Performed by: NURSE PRACTITIONER

## 2021-10-15 PROCEDURE — G8417 CALC BMI ABV UP PARAM F/U: HCPCS | Performed by: NURSE PRACTITIONER

## 2021-10-15 PROCEDURE — 93280 PM DEVICE PROGR EVAL DUAL: CPT | Performed by: NURSE PRACTITIONER

## 2021-10-15 PROCEDURE — 93280 PM DEVICE PROGR EVAL DUAL: CPT | Performed by: INTERNAL MEDICINE

## 2021-10-15 PROCEDURE — G8536 NO DOC ELDER MAL SCRN: HCPCS | Performed by: NURSE PRACTITIONER

## 2021-10-15 PROCEDURE — G8427 DOCREV CUR MEDS BY ELIG CLIN: HCPCS | Performed by: NURSE PRACTITIONER

## 2021-10-15 PROCEDURE — 3017F COLORECTAL CA SCREEN DOC REV: CPT | Performed by: NURSE PRACTITIONER

## 2021-10-15 PROCEDURE — G8754 DIAS BP LESS 90: HCPCS | Performed by: NURSE PRACTITIONER

## 2021-10-15 PROCEDURE — G8753 SYS BP > OR = 140: HCPCS | Performed by: NURSE PRACTITIONER

## 2021-10-15 PROCEDURE — G8432 DEP SCR NOT DOC, RNG: HCPCS | Performed by: NURSE PRACTITIONER

## 2021-10-15 PROCEDURE — 99215 OFFICE O/P EST HI 40 MIN: CPT | Performed by: NURSE PRACTITIONER

## 2021-10-15 NOTE — PROGRESS NOTES
HISTORY OF PRESENTING ILLNESS      Meet Gr is a 66 y.o. male with hypercholesteremia, hypertension and recent CVA with  ILR injection for cryptogenic CVA; however, was found to have up to 6 second pauses on his ILR and thus underwent ILR removal followed by dual chamber PPM implantation.    He denies cardiac complaints and has been doing well. PAST MEDICAL HISTORY     Past Medical History:   Diagnosis Date    Hypercholesteremia     Hypertension     Migraines     Obesity (BMI 30-39.9) 3/25/2017    S/P cardiac pacemaker procedure 3/30/2021    For heart block dr dean 2018    Stroke [facility])            PAST SURGICAL HISTORY     Past Surgical History:   Procedure Laterality Date    HX ORTHOPAEDIC      knees          ALLERGIES     Allergies   Allergen Reactions    Ace Inhibitors Rash    Erythromycin Hives     Pt states allergic to all mycins     Lipitor [Atorvastatin] Rash    Macrolide Antibiotics Hives    Niacin Rash    Other Medication Hives     All mycin medication groups/family           FAMILY HISTORY     Family History   Problem Relation Age of Onset    Stroke Father    [employer] Migraines Sister     Stroke Mother     Lung Cancer Brother         smoker    Hypertension Neg Hx     Heart Disease Neg Hx     negative for cardiac disease       SOCIAL HISTORY     Social History     Socioeconomic History    Marital status:      Spouse name: Not on file    Number of children: Not on file    Years of education: Not on file    Highest education level: Not on file   Tobacco Use    Smoking status: Former Smoker     Quit date:      Years since quittin.7    Smokeless tobacco: Never Used   Substance and Sexual Activity    Alcohol use:  Yes     Alcohol/week: 2.0 standard drinks     Types: 2 Shots of liquor per week     Comment: occ    Drug use: No    Sexual activity: Yes     Social Determinants of Health     Financial Resource Strain:     Difficulty of Paying Living Expenses: Food Insecurity:     Worried About Running Out of Food in the Last Year:     [address] in the Last Year:    Transportation Needs:     Lack of Transportation (Medical):  Lack of Transportation (Non-Medical):    Physical Activity:     Days of Exercise per Week:     Minutes of Exercise per Session:    Stress:     Feeling of Stress :    Social Connections:     Frequency of Communication with Friends and Family:     Frequency of Social Gatherings with Friends and Family:     Attends Taoism Services:     Active Member of Clubs or Organizations:     Attends Club or Organization Meetings:     Marital Status:          MEDICATIONS     Current Outpatient Medications   Medication Sig    amLODIPine (NORVASC) 10 mg tablet TAKE ONE TABLET BY MOUTH DAILY    fluticasone propionate (FLONASE) 50 mcg/actuation nasal spray 2 Sprays by Both Nostrils route daily.  clopidogreL (PLAVIX) 75 mg tab TAKE ONE TABLET BY MOUTH DAILY    pravastatin (PRAVACHOL) 80 mg tablet TAKE ONE TABLET BY MOUTH DAILY    irbesartan (AVAPRO) 300 mg tablet TAKE ONE TABLET BY MOUTH ONCE NIGHTLY    aspirin 81 mg chewable tablet Take 1 Tab by mouth daily. Indications: Acute Coronary Syndrome    methylPREDNISolone (MEDROL DOSEPACK) 4 mg tablet Per pack (Patient not taking: Reported on 10/15/2021)     No current facility-administered medications for this visit. I have reviewed the nurses notes, vitals, problem list, allergy list, medical history, family, social history and medications. REVIEW OF SYMPTOMS      General: Pt denies excessive weight gain or loss. Pt is able to conduct ADL's  HEENT: Denies blurred vision, headaches, hearing loss, epistaxis and difficulty swallowing. Respiratory: Denies cough, congestion, shortness of breath, SOLANO, wheezing or stridor.   Cardiovascular: Denies precordial pain, palpitations, edema or PND  Gastrointestinal: Denies poor appetite, indigestion, abdominal pain or blood in stool  Genitourinary: Denies hematuria, dysuria, increased urinary frequency  Musculoskeletal: Denies joint pain or swelling from muscles or joints  Neurologic: Denies tremor, paresthesias, headache, or sensory motor disturbance  Psychiatric: Denies confusion, insomnia, depression  Integumentray: Denies rash, itching or ulcers. Hematologic: Denies easy bruising, bleeding       PHYSICAL EXAMINATION      Vitals: see vitals section  General: Well developed, in no acute distress. HEENT: No jaundice, oral mucosa moist, no oral ulcers  Neck: Supple, no stiffness, no lymphadenopathy, supple  Heart:  Normal S1/S2 negative S3 or S4. Regular, no murmur, gallop or rub, no jugular venous distention  Respiratory: Clear bilaterally x 4, no wheezing or rales  Abdomen:   Soft, non-tender, bowel sounds are active. Extremities:  No edema, normal cap refill, no cyanosis. Musculoskeletal: No clubbing, no deformities  Neuro: A&Ox3, speech clear, gait stable, cooperative, no focal neurologic deficits  Skin: Skin color is normal. No rashes or lesions. Non diaphoretic, moist.  Vascular: 2+ pulses symmetric in all extremities       DIAGNOSTIC DATA      EKG: sinus rhythm        LABORATORY DATA      Lab Results   Component Value Date/Time    WBC 6.5 03/17/2020 09:40 AM    HGB 16.8 03/17/2020 09:40 AM    HCT 52.3 (H) 03/17/2020 09:40 AM    PLATELET 718 33/31/2867 09:40 AM    MCV 95.3 03/17/2020 09:40 AM      Lab Results   Component Value Date/Time    Sodium 138 03/30/2021 09:49 AM    Potassium 4.3 03/30/2021 09:49 AM    Chloride 103 03/30/2021 09:49 AM    CO2 30 03/30/2021 09:49 AM    Anion gap 5 03/30/2021 09:49 AM    Glucose 102 (H) 03/30/2021 09:49 AM    BUN 20 03/30/2021 09:49 AM    Creatinine 0.94 03/30/2021 09:49 AM    BUN/Creatinine ratio 21 (H) 03/30/2021 09:49 AM    GFR est AA >60 03/30/2021 09:49 AM    GFR est non-AA >60 03/30/2021 09:49 AM    Calcium 9.2 03/30/2021 09:49 AM    Bilirubin, total 0.5 03/30/2021 09:49 AM    Alk. phosphatase 112 03/30/2021 09:49 AM    Protein, total 8.1 03/30/2021 09:49 AM    Albumin 4.1 03/30/2021 09:49 AM    Globulin 4.0 03/30/2021 09:49 AM    A-G Ratio 1.0 (L) 03/30/2021 09:49 AM    ALT (SGPT) 24 03/30/2021 09:49 AM           ASSESSMENT      1. Bradycardia/SSS  2. CVA  3. Hypertension  4. Hyperlipidemia   5. PPM                        O. Dual                        P. Medtronic     PLAN     Device interrogation shows normal functioning with appropriate pacing. Continue current medical therapy for hypertension and follow ups remotely every 3 months per device clinic. FOLLOW-UP     1 year    Thank you, Bassam Villar MD for allowing me to participate in the care of this extraordinarily pleasant male. Please do not hesitate to contact me for further questions/concerns.      CHARY Callahan [facility].  [address] [place] [address]  [phone] / [phone] Fax   [phone] / [phone] Fax

## 2021-10-15 NOTE — LETTER
10/15/2021    Patient: Shelbie Lebron   YOB: 1950   Date of Visit: 10/15/2021     [address]  [address]  [place] [address]  Via In Basket    Dear Kris Wade MD,      Thank you for referring Mr. Regi Oseguera to [address] for evaluation. My notes for this consultation are attached. If you have questions, please do not hesitate to call me. I look forward to following your patient along with you.       Sincerely,    Uzair Lino NP

## 2021-10-15 NOTE — PROGRESS NOTES
Room #: 3      Visit Vitals  BP (!) 144/64 (BP 1 Location: Right upper arm, BP Patient Position: Sitting, BP Cuff Size: Adult)   Pulse 74   Resp 16   Ht 5' 11\" (1.803 m)   Wt 235 lb 6.4 oz (106.8 kg)   SpO2 98%   BMI 32.83 kg/m²         Chest pain:  NO  Shortness of breath:  NO  Edema: NO  Palpitations, skipped beats, rapid heartbeat:  NO  Dizziness:  YES - inner ear issues    1. Have you been to the ER, urgent care clinic since your last visit? Hospitalized since your last visit? No    2. Have you seen or consulted any other health care providers outside of the [address] [place] [address] since your last visit? Include any pap smears or colon screening.  No      Refills:  NO

## 2021-10-25 DIAGNOSIS — E78.5 DYSLIPIDEMIA, GOAL LDL BELOW 70: ICD-10-CM

## 2021-10-25 RX ORDER — PRAVASTATIN SODIUM 80 MG/1
TABLET ORAL
Qty: 90 TABLET | Refills: 0 | Status: SHIPPED | OUTPATIENT
Start: 2021-10-25 | End: 2022-01-18

## 2021-11-02 DIAGNOSIS — R29.810 FACIAL DROOP: ICD-10-CM

## 2021-11-02 DIAGNOSIS — I63.9 ACUTE CVA (CEREBROVASCULAR ACCIDENT) (HCC): ICD-10-CM

## 2021-11-02 RX ORDER — CLOPIDOGREL BISULFATE 75 MG/1
TABLET ORAL
Qty: 90 TABLET | Refills: 0 | Status: SHIPPED | OUTPATIENT
Start: 2021-11-02 | End: 2022-01-18

## 2022-01-18 DIAGNOSIS — I63.9 ACUTE CVA (CEREBROVASCULAR ACCIDENT) (HCC): ICD-10-CM

## 2022-01-18 DIAGNOSIS — R29.810 FACIAL DROOP: ICD-10-CM

## 2022-01-18 DIAGNOSIS — I10 HTN, GOAL BELOW 130/80: ICD-10-CM

## 2022-01-18 DIAGNOSIS — E78.5 DYSLIPIDEMIA, GOAL LDL BELOW 70: ICD-10-CM

## 2022-01-18 RX ORDER — AMLODIPINE BESYLATE 10 MG/1
TABLET ORAL
Qty: 90 TABLET | Refills: 1 | Status: SHIPPED | OUTPATIENT
Start: 2022-01-18 | End: 2022-06-18

## 2022-01-18 RX ORDER — CLOPIDOGREL BISULFATE 75 MG/1
TABLET ORAL
Qty: 90 TABLET | Refills: 0 | Status: SHIPPED | OUTPATIENT
Start: 2022-01-18 | End: 2022-05-07

## 2022-01-18 RX ORDER — PRAVASTATIN SODIUM 80 MG/1
TABLET ORAL
Qty: 90 TABLET | Refills: 0 | Status: SHIPPED | OUTPATIENT
Start: 2022-01-18 | End: 2022-04-29

## 2022-01-21 NOTE — PROGRESS NOTES
c/MDT pacer remote    Normal & appropriate pacer function. No recorded events. See scanned document for details.
No

## 2022-01-24 ENCOUNTER — TELEPHONE (OUTPATIENT)
Dept: CARDIOLOGY CLINIC | Age: 72
End: 2022-01-24

## 2022-01-24 ENCOUNTER — OFFICE VISIT (OUTPATIENT)
Dept: CARDIOLOGY CLINIC | Age: 72
End: 2022-01-24
Payer: MEDICARE

## 2022-01-24 DIAGNOSIS — Z95.0 CARDIAC PACEMAKER IN SITU: Primary | ICD-10-CM

## 2022-01-24 PROCEDURE — 93296 REM INTERROG EVL PM/IDS: CPT | Performed by: INTERNAL MEDICINE

## 2022-01-24 NOTE — TELEPHONE ENCOUNTER
Returned patient's call, ID verified using two patient identifiers   Advised pt that transmission was received today, device functioning normally as programmed, no unusual events. Pt states he will be out of town from 1/30-3/22. Next remote scheduled for April.

## 2022-01-24 NOTE — TELEPHONE ENCOUNTER
Patient called stating that his device was \"acting weird\" and he kept receiving an error. Stated that the device was updated and was unsure if the update was the cause of the constant errors. Wants to confirm if a transmission was received today from his device as he received a note from [employer] on 1/11.        Phone: [phone]

## 2022-03-19 PROBLEM — I45.9 HEART BLOCK: Status: ACTIVE | Noted: 2017-06-20

## 2022-03-19 PROBLEM — E78.00 HIGH CHOLESTEROL: Status: ACTIVE | Noted: 2017-03-25

## 2022-03-19 PROBLEM — I63.9 ACUTE CVA (CEREBROVASCULAR ACCIDENT) (HCC): Status: ACTIVE | Noted: 2017-03-26

## 2022-03-20 DIAGNOSIS — I10 HTN, GOAL BELOW 130/80: ICD-10-CM

## 2022-03-20 PROBLEM — Z95.0 S/P CARDIAC PACEMAKER PROCEDURE: Status: ACTIVE | Noted: 2021-03-30

## 2022-03-20 PROBLEM — E66.9 OBESITY (BMI 30-39.9): Status: ACTIVE | Noted: 2017-03-25

## 2022-03-20 PROBLEM — I67.2 INTRACRANIAL ATHEROSCLEROSIS: Status: ACTIVE | Noted: 2018-04-20

## 2022-03-20 RX ORDER — IRBESARTAN 300 MG/1
TABLET ORAL
Qty: 90 TABLET | Refills: 0 | Status: SHIPPED | OUTPATIENT
Start: 2022-03-20 | End: 2022-06-18

## 2022-04-29 DIAGNOSIS — E78.5 DYSLIPIDEMIA, GOAL LDL BELOW 70: ICD-10-CM

## 2022-04-29 RX ORDER — PRAVASTATIN SODIUM 80 MG/1
TABLET ORAL
Qty: 90 TABLET | Refills: 0 | Status: SHIPPED | OUTPATIENT
Start: 2022-04-29 | End: 2022-08-02 | Stop reason: SDUPTHER

## 2022-05-06 DIAGNOSIS — I63.9 ACUTE CVA (CEREBROVASCULAR ACCIDENT) (HCC): ICD-10-CM

## 2022-05-06 DIAGNOSIS — R29.810 FACIAL DROOP: ICD-10-CM

## 2022-05-07 RX ORDER — CLOPIDOGREL BISULFATE 75 MG/1
TABLET ORAL
Qty: 90 TABLET | Refills: 0 | Status: SHIPPED | OUTPATIENT
Start: 2022-05-07 | End: 2022-08-22 | Stop reason: SDUPTHER

## 2022-06-18 DIAGNOSIS — I10 HTN, GOAL BELOW 130/80: ICD-10-CM

## 2022-06-18 RX ORDER — AMLODIPINE BESYLATE 10 MG/1
TABLET ORAL
Qty: 90 TABLET | Refills: 1 | Status: SHIPPED | OUTPATIENT
Start: 2022-06-18

## 2022-06-18 RX ORDER — IRBESARTAN 300 MG/1
TABLET ORAL
Qty: 90 TABLET | Refills: 0 | Status: SHIPPED | OUTPATIENT
Start: 2022-06-18 | End: 2022-09-04

## 2022-07-12 ENCOUNTER — OFFICE VISIT (OUTPATIENT)
Dept: CARDIOLOGY CLINIC | Age: 72
End: 2022-07-12
Payer: MEDICARE

## 2022-07-12 DIAGNOSIS — Z95.0 CARDIAC PACEMAKER IN SITU: Primary | ICD-10-CM

## 2022-07-12 PROCEDURE — 93296 REM INTERROG EVL PM/IDS: CPT | Performed by: INTERNAL MEDICINE

## 2022-07-19 ENCOUNTER — DOCUMENTATION ONLY (OUTPATIENT)
Dept: CARDIOLOGY CLINIC | Age: 72
End: 2022-07-19

## 2022-07-19 NOTE — PROGRESS NOTES
medtronic dual chamber pacemaker for sick sinus syndrome  Not pacing dependent  Do not need to make adjustment to pacemaker for left arm pit mass surgery    Hx of stroke per Mila Connolly NP's note and plavix can be held before surgery  (5 days)

## 2022-08-01 ENCOUNTER — TELEPHONE (OUTPATIENT)
Dept: INTERNAL MEDICINE CLINIC | Age: 72
End: 2022-08-01

## 2022-08-01 NOTE — TELEPHONE ENCOUNTER
He has not see  in over 10 months & PCP requires an appt to be made. Please assist with scheduling for next available in Aug. Once appt is confirmed a supply can be sent to last until his visit.

## 2022-08-01 NOTE — TELEPHONE ENCOUNTER
Patient was calling for a refill on his PRAVASTATIN Medication  is Pharmacy told him that it was denied, and the patient needs to know why. Please call him at [phone].

## 2022-08-02 ENCOUNTER — OFFICE VISIT (OUTPATIENT)
Dept: CARDIOLOGY CLINIC | Age: 72
End: 2022-08-02
Payer: MEDICARE

## 2022-08-02 VITALS
HEART RATE: 82 BPM | WEIGHT: 248 LBS | DIASTOLIC BLOOD PRESSURE: 82 MMHG | BODY MASS INDEX: 34.72 KG/M2 | HEIGHT: 71 IN | OXYGEN SATURATION: 97 % | SYSTOLIC BLOOD PRESSURE: 138 MMHG

## 2022-08-02 DIAGNOSIS — I49.5 SSS (SICK SINUS SYNDROME) (HCC): ICD-10-CM

## 2022-08-02 DIAGNOSIS — Z01.810 PREOP CARDIOVASCULAR EXAM: ICD-10-CM

## 2022-08-02 DIAGNOSIS — Z00.00 ANNUAL PHYSICAL EXAM: Primary | ICD-10-CM

## 2022-08-02 DIAGNOSIS — E78.00 HIGH CHOLESTEROL: Chronic | ICD-10-CM

## 2022-08-02 DIAGNOSIS — Z95.0 S/P CARDIAC PACEMAKER PROCEDURE: ICD-10-CM

## 2022-08-02 DIAGNOSIS — I63.9 ACUTE CVA (CEREBROVASCULAR ACCIDENT) (HCC): ICD-10-CM

## 2022-08-02 DIAGNOSIS — I10 HYPERTENSION, UNSPECIFIED TYPE: Chronic | ICD-10-CM

## 2022-08-02 DIAGNOSIS — E78.5 DYSLIPIDEMIA, GOAL LDL BELOW 70: ICD-10-CM

## 2022-08-02 PROCEDURE — 93010 ELECTROCARDIOGRAM REPORT: CPT | Performed by: INTERNAL MEDICINE

## 2022-08-02 PROCEDURE — 1123F ACP DISCUSS/DSCN MKR DOCD: CPT | Performed by: INTERNAL MEDICINE

## 2022-08-02 PROCEDURE — G0463 HOSPITAL OUTPT CLINIC VISIT: HCPCS | Performed by: INTERNAL MEDICINE

## 2022-08-02 PROCEDURE — 99213 OFFICE O/P EST LOW 20 MIN: CPT | Performed by: INTERNAL MEDICINE

## 2022-08-02 PROCEDURE — 93005 ELECTROCARDIOGRAM TRACING: CPT | Performed by: INTERNAL MEDICINE

## 2022-08-02 RX ORDER — PRAVASTATIN SODIUM 80 MG/1
80 TABLET ORAL DAILY
Qty: 90 TABLET | Refills: 3 | Status: SHIPPED | OUTPATIENT
Start: 2022-08-02

## 2022-08-02 NOTE — PROGRESS NOTES
Koki Velarde MD, MS, [facility]            HISTORY OF PRESENT ILLNESS:    Candice Lacey is a 74 y.o. male here for FU. Candice Lacey is a 66 y.o. male with hypercholesteremia, hypertension and recent CVA with  ILR injection for cryptogenic CVA; however, was found to have up to 6 second pauses on his ILR and thus underwent ILR removal followed by dual chamber PPM implantation. Preop for planned left arm pit mass surgery/cyst.  Scheduled for 8/10 @ Dr. Brian Barnett. From Dr. Mata Dura: Medtronic dual chamber pacemaker for sick sinus syndrome  Not pacing dependent  Do not need to make adjustment to pacemaker for left arm pit mass surgery     Hx of stroke per Karilyn Pallas NP's note and plavix can be held before surgery    EKG reviewed - NSR. OK to proceed. (5 days)    SUMMARY:   Problem List  Date Reviewed: 8/2/2022            Codes Class Noted    SSS (sick sinus syndrome) ([facility] [address] ICD-10-CM: I49.5  ICD-9-CM: 427.81  8/2/2022        Preop cardiovascular exam ICD-10-CM: Z01.810  ICD-9-CM: V72.81  8/2/2022        S/P cardiac pacemaker procedure ICD-10-CM: Z95.0  ICD-9-CM: V45.01  3/30/2021    Overview Signed 3/30/2021  8:27 AM by Ranjana Garcia MD     For heart block dr dean 2018             Intracranial atherosclerosis ICD-10-CM: I67.2  ICD-9-CM: 437.0  4/20/2018        Heart block ICD-10-CM: I45.9  ICD-9-CM: 426.9  6/20/2017    Overview Signed 6/20/2017 11:14 AM by Stacy Babcock LPN     During last office visit, Atenolol was discontinued d/t 3 second pause on Linq recorder. Recent ILR download demonstrates a 3 second and 6 second pause during waking hours. Patient reports no symptoms. D/w patient via telephone Dr. Alisha Loza recommendation of a pacemaker and he is agreeable with plan. Will schedule.               Acute CVA (cerebrovascular accident) [facility]) ICD-10-CM: I63.9  ICD-9-CM: 434.91  3/26/2017    Overview Signed 3/17/2020  8:32 AM by Ranjana Garcia MD     2 cryptogenic strokes in 2019  With mild aphasia and right facial droop             High cholesterol (Chronic) ICD-10-CM: E78.00  ICD-9-CM: 272.0  3/25/2017        HTN (hypertension) (Chronic) ICD-10-CM: I10  ICD-9-CM: 401.9  3/25/2017        Obesity (BMI 30-39.9) (Chronic) ICD-10-CM: Z89.3  ICD-9-CM: 278.00  3/25/2017           Current Outpatient Medications on File Prior to Visit   Medication Sig    irbesartan (AVAPRO) 300 mg tablet TAKE ONE TABLET BY MOUTH ONCE NIGHTLY    amLODIPine (NORVASC) 10 mg tablet TAKE ONE TABLET BY MOUTH DAILY    clopidogreL (PLAVIX) 75 mg tab TAKE ONE TABLET BY MOUTH DAILY    fluticasone propionate (FLONASE) 50 mcg/actuation nasal spray 2 Sprays by Both Nostrils route daily. (Patient taking differently: 2 Sprays by Both Nostrils route as needed.)    aspirin 81 mg chewable tablet Take 1 Tab by mouth daily. Indications: Acute Coronary Syndrome     No current facility-administered medications on file prior to visit. CARDIOLOGY STUDIES TO DATE:  No results found for this visit on 08/02/22.               Chief Complaint   Patient presents with    New Patient     Prior Pt of Dr. David Tiwari Surg clear surgery on 8-17-22         CARDIAC ROS:   negative    Past Medical History:   Diagnosis Date    Hypercholesteremia     Hypertension     Migraines     Obesity (BMI 30-39.9) 3/25/2017    S/P cardiac pacemaker procedure 3/30/2021    For heart block dr dean 2018    Stroke [facility])        Family History   Problem Relation Age of Onset    Stroke Father     Migraines Sister     Stroke Mother     Lung Cancer Brother         smoker    Hypertension Neg Hx     Heart Disease Neg Hx        Social History     Socioeconomic History    Marital status:      Spouse name: Not on file    Number of children: Not on file    Years of education: Not on file    Highest education level: Not on file   Occupational History    Not on file   Tobacco Use    Smoking status: Former     Types: Cigarettes     Quit date: 1977     Years since quitting: 45.6    Smokeless tobacco: Never   Substance and Sexual Activity    Alcohol use: Yes     Alcohol/week: 2.0 standard drinks     Types: 2 Shots of liquor per week     Comment: occ    Drug use: No    Sexual activity: Yes   Other Topics Concern    Not on file   Social History Narrative    Not on file     Social Determinants of Health     Financial Resource Strain: Not on file   Food Insecurity: Not on file   Transportation Needs: Not on file   Physical Activity: Not on file   Stress: Not on file   Social Connections: Not on file   Intimate Partner Violence: Not on file   Housing Stability: Not on file        GENERAL ROS:  A comprehensive review of systems was negative except for that written in the HPI.     Visit Vitals  /82   Pulse 82   Ht 5' 11\" (1.803 m)   Wt 248 lb (112.5 kg)   SpO2 97%   BMI 34.59 kg/m²       Wt Readings from Last 3 Encounters:   08/02/22 248 lb (112.5 kg)   10/15/21 235 lb 6.4 oz (106.8 kg)   09/15/21 232 lb 6.4 oz (105.4 kg)            BP Readings from Last 3 Encounters:   08/02/22 138/82   10/15/21 (!) 144/64   09/15/21 124/74       PHYSICAL EXAM  General appearance: alert, cooperative, no distress, appears stated age  Neck: supple, symmetrical, trachea midline, no adenopathy, thyroid: not enlarged, symmetric, no tenderness/mass/nodules, no carotid bruit, and no JVD  Lungs: clear to auscultation bilaterally  Heart: regular rate and rhythm, S1, S2 normal, no murmur, click, rub or gallop  Extremities: extremities normal, atraumatic, no cyanosis or edema    Lab Results   Component Value Date/Time    Cholesterol, total 137 03/30/2021 09:49 AM    Cholesterol, total 154 10/20/2020 12:02 PM    Cholesterol, total 129 03/17/2020 09:40 AM    Cholesterol, total 187 03/26/2017 05:30 AM    HDL Cholesterol 50 03/30/2021 09:49 AM    HDL Cholesterol 50 10/20/2020 12:02 PM    HDL Cholesterol 47 03/17/2020 09:40 AM    HDL Cholesterol 45 03/26/2017 05:30 AM    LDL, calculated 71 03/30/2021 09:49 AM    LDL, calculated 73 10/20/2020 12:02 PM    LDL, calculated 62 03/17/2020 09:40 AM    LDL, calculated 113.6 (H) 03/26/2017 05:30 AM    Triglyceride 80 03/30/2021 09:49 AM    Triglyceride 155 (H) 10/20/2020 12:02 PM    Triglyceride 100 03/17/2020 09:40 AM    Triglyceride 142 03/26/2017 05:30 AM    CHOL/HDL Ratio 2.7 03/30/2021 09:49 AM    CHOL/HDL Ratio 3.1 10/20/2020 12:02 PM    CHOL/HDL Ratio 2.7 03/17/2020 09:40 AM    CHOL/HDL Ratio 4.2 03/26/2017 05:30 AM       ASSESSMENT    ICD-10-CM ICD-9-CM    1. Annual physical exam  Z00.00 V70.0 AMB POC EKG ROUTINE W/ 12 LEADS, INTER & REP      2. Hypertension, unspecified type  I10 401.9       3. SSS (sick sinus syndrome) ([facility])  I49.5 427.81       4. High cholesterol  E78.00 272.0       5. Acute CVA (cerebrovascular accident) ([facility] [address]  I63.9 434.91       6. S/P cardiac pacemaker procedure  Z95.0 V45.01       7. Dyslipidemia, goal LDL below 70  E78.5 272.4 pravastatin (PRAVACHOL) 80 mg tablet      8. Preop cardiovascular exam  Z01.810 V72.81           Encounter Diagnoses   Name Primary? Annual physical exam Yes    Hypertension, unspecified type     SSS (sick sinus syndrome) ([facility] [address]     High cholesterol     Acute CVA (cerebrovascular accident) ([facility] [address]     S/P cardiac pacemaker procedure     Dyslipidemia, goal LDL below 70     Preop cardiovascular exam      Orders Placed This Encounter    AMB POC EKG ROUTINE W/ 12 LEADS, INTER & REP    pravastatin (PRAVACHOL) 80 mg tablet             Nara Mendoza MD  8/2/2022        [address] [facility]  [address] [facility], [address] [place] [address]  [phone] (F)    [address]  [address]  [phone] (P)  [phone] (F)    ATTENTION:   This medical record was transcribed using an electronic medical records/speech recognition system. Although proofread, it may and can contain electronic, spelling and other errors. Corrections may be executed at a later time.   Please feel free to contact us for any clarifications as needed.

## 2022-08-02 NOTE — PROGRESS NOTES
Chief Complaint   Patient presents with    New Patient     Prior Pt of Dr. Wanda Andrade Surg clear surgery on 8-17-22       Vitals:    08/02/22 1120   BP: 138/82   Pulse: 82   Height: 5' 11\" (1.803 m)   Weight: 248 lb (112.5 kg)   SpO2: 97%         Chest pain: no    SOB: no    Palpitations: no    Dizziness: no    Swelling: no    Refills:       Have you been to the ER, urgent care clinic since your last visit? Hospitalized since your last visit? no    Have you sen or consulted other health care providers outside of the [facility] system since your last visit?  (Include any pap smears or colon screening.)

## 2022-08-22 DIAGNOSIS — I63.9 ACUTE CVA (CEREBROVASCULAR ACCIDENT) (HCC): ICD-10-CM

## 2022-08-22 DIAGNOSIS — R29.810 FACIAL DROOP: ICD-10-CM

## 2022-08-22 RX ORDER — CLOPIDOGREL BISULFATE 75 MG/1
75 TABLET ORAL DAILY
Qty: 30 TABLET | Refills: 0 | Status: SHIPPED | OUTPATIENT
Start: 2022-08-22 | End: 2022-09-16

## 2022-08-22 NOTE — TELEPHONE ENCOUNTER
Patient called to state he is completely out of his medication and would like to know if he can have just enough sent in to get him to his appointment on 9/6.

## 2022-09-01 PROBLEM — Z01.810 PREOP CARDIOVASCULAR EXAM: Status: RESOLVED | Noted: 2022-08-02 | Resolved: 2022-09-01

## 2022-09-04 DIAGNOSIS — I10 HTN, GOAL BELOW 130/80: ICD-10-CM

## 2022-09-04 RX ORDER — IRBESARTAN 300 MG/1
TABLET ORAL
Qty: 90 TABLET | Refills: 0 | Status: SHIPPED | OUTPATIENT
Start: 2022-09-04

## 2022-09-06 ENCOUNTER — OFFICE VISIT (OUTPATIENT)
Dept: INTERNAL MEDICINE CLINIC | Age: 72
End: 2022-09-06
Payer: MEDICARE

## 2022-09-06 VITALS
DIASTOLIC BLOOD PRESSURE: 84 MMHG | BODY MASS INDEX: 34.52 KG/M2 | HEIGHT: 71 IN | OXYGEN SATURATION: 96 % | WEIGHT: 246.6 LBS | SYSTOLIC BLOOD PRESSURE: 132 MMHG | RESPIRATION RATE: 16 BRPM | TEMPERATURE: 97.5 F | HEART RATE: 76 BPM

## 2022-09-06 DIAGNOSIS — Z23 ENCOUNTER FOR IMMUNIZATION: ICD-10-CM

## 2022-09-06 DIAGNOSIS — E78.5 DYSLIPIDEMIA, GOAL LDL BELOW 100: ICD-10-CM

## 2022-09-06 DIAGNOSIS — Z00.00 MEDICARE ANNUAL WELLNESS VISIT, SUBSEQUENT: Primary | ICD-10-CM

## 2022-09-06 DIAGNOSIS — I10 HTN, GOAL BELOW 130/80: ICD-10-CM

## 2022-09-06 DIAGNOSIS — I49.5 SSS (SICK SINUS SYNDROME) (HCC): ICD-10-CM

## 2022-09-06 LAB
ALBUMIN SERPL-MCNC: 4 G/DL (ref 3.5–5)
ALBUMIN/GLOB SERPL: 1 {RATIO} (ref 1.1–2.2)
ALP SERPL-CCNC: 110 U/L (ref 45–117)
ALT SERPL-CCNC: 21 U/L (ref 12–78)
ANION GAP SERPL CALC-SCNC: 5 MMOL/L (ref 5–15)
AST SERPL-CCNC: 15 U/L (ref 15–37)
BASOPHILS # BLD: 0.1 K/UL (ref 0–0.1)
BASOPHILS NFR BLD: 1 % (ref 0–1)
BILIRUB SERPL-MCNC: 0.5 MG/DL (ref 0.2–1)
BUN SERPL-MCNC: 18 MG/DL (ref 6–20)
BUN/CREAT SERPL: 18 (ref 12–20)
CALCIUM SERPL-MCNC: 9.2 MG/DL (ref 8.5–10.1)
CHLORIDE SERPL-SCNC: 106 MMOL/L (ref 97–108)
CHOLEST SERPL-MCNC: 176 MG/DL
CO2 SERPL-SCNC: 29 MMOL/L (ref 21–32)
CREAT SERPL-MCNC: 1.02 MG/DL (ref 0.7–1.3)
DIFFERENTIAL METHOD BLD: ABNORMAL
EOSINOPHIL # BLD: 0.3 K/UL (ref 0–0.4)
EOSINOPHIL NFR BLD: 4 % (ref 0–7)
ERYTHROCYTE [DISTWIDTH] IN BLOOD BY AUTOMATED COUNT: 12.9 % (ref 11.5–14.5)
GLOBULIN SER CALC-MCNC: 3.9 G/DL (ref 2–4)
GLUCOSE SERPL-MCNC: 106 MG/DL (ref 65–100)
HCT VFR BLD AUTO: 52.5 % (ref 36.6–50.3)
HDLC SERPL-MCNC: 52 MG/DL
HDLC SERPL: 3.4 {RATIO} (ref 0–5)
HGB BLD-MCNC: 17.5 G/DL (ref 12.1–17)
IMM GRANULOCYTES # BLD AUTO: 0 K/UL (ref 0–0.04)
IMM GRANULOCYTES NFR BLD AUTO: 0 % (ref 0–0.5)
LDLC SERPL CALC-MCNC: 92.6 MG/DL (ref 0–100)
LYMPHOCYTES # BLD: 1.7 K/UL (ref 0.8–3.5)
LYMPHOCYTES NFR BLD: 23 % (ref 12–49)
MCH RBC QN AUTO: 30.9 PG (ref 26–34)
MCHC RBC AUTO-ENTMCNC: 33.3 G/DL (ref 30–36.5)
MCV RBC AUTO: 92.8 FL (ref 80–99)
MONOCYTES # BLD: 0.7 K/UL (ref 0–1)
MONOCYTES NFR BLD: 9 % (ref 5–13)
NEUTS SEG # BLD: 4.6 K/UL (ref 1.8–8)
NEUTS SEG NFR BLD: 63 % (ref 32–75)
NRBC # BLD: 0 K/UL (ref 0–0.01)
NRBC BLD-RTO: 0 PER 100 WBC
PLATELET # BLD AUTO: 241 K/UL (ref 150–400)
PMV BLD AUTO: 9.4 FL (ref 8.9–12.9)
POTASSIUM SERPL-SCNC: 4.6 MMOL/L (ref 3.5–5.1)
PROT SERPL-MCNC: 7.9 G/DL (ref 6.4–8.2)
RBC # BLD AUTO: 5.66 M/UL (ref 4.1–5.7)
SODIUM SERPL-SCNC: 140 MMOL/L (ref 136–145)
TRIGL SERPL-MCNC: 157 MG/DL (ref ?–150)
VLDLC SERPL CALC-MCNC: 31.4 MG/DL
WBC # BLD AUTO: 7.3 K/UL (ref 4.1–11.1)

## 2022-09-06 PROCEDURE — G8754 DIAS BP LESS 90: HCPCS | Performed by: INTERNAL MEDICINE

## 2022-09-06 PROCEDURE — G0463 HOSPITAL OUTPT CLINIC VISIT: HCPCS | Performed by: INTERNAL MEDICINE

## 2022-09-06 PROCEDURE — 99214 OFFICE O/P EST MOD 30 MIN: CPT | Performed by: INTERNAL MEDICINE

## 2022-09-06 PROCEDURE — 3017F COLORECTAL CA SCREEN DOC REV: CPT | Performed by: INTERNAL MEDICINE

## 2022-09-06 PROCEDURE — 1101F PT FALLS ASSESS-DOCD LE1/YR: CPT | Performed by: INTERNAL MEDICINE

## 2022-09-06 PROCEDURE — G8417 CALC BMI ABV UP PARAM F/U: HCPCS | Performed by: INTERNAL MEDICINE

## 2022-09-06 PROCEDURE — 90715 TDAP VACCINE 7 YRS/> IM: CPT | Performed by: INTERNAL MEDICINE

## 2022-09-06 PROCEDURE — G8510 SCR DEP NEG, NO PLAN REQD: HCPCS | Performed by: INTERNAL MEDICINE

## 2022-09-06 PROCEDURE — G8536 NO DOC ELDER MAL SCRN: HCPCS | Performed by: INTERNAL MEDICINE

## 2022-09-06 PROCEDURE — G0439 PPPS, SUBSEQ VISIT: HCPCS | Performed by: INTERNAL MEDICINE

## 2022-09-06 PROCEDURE — G8752 SYS BP LESS 140: HCPCS | Performed by: INTERNAL MEDICINE

## 2022-09-06 PROCEDURE — G8427 DOCREV CUR MEDS BY ELIG CLIN: HCPCS | Performed by: INTERNAL MEDICINE

## 2022-09-06 NOTE — PATIENT INSTRUCTIONS
Medicare Wellness Visit, Male    The best way to live healthy is to have a lifestyle where you eat a well-balanced diet, exercise regularly, limit alcohol use, and quit all forms of tobacco/nicotine, if applicable. Regular preventive services are another way to keep healthy. Preventive services (vaccines, screening tests, monitoring & exams) can help personalize your care plan, which helps you manage your own care. Screening tests can find health problems at the earliest stages, when they are easiest to treat. [facility] follows the current, evidence-based guidelines published by the [facility] ([facility]) when recommending preventive services for our patients. Because we follow these guidelines, sometimes recommendations change over time as research supports it. (For example, a prostate screening blood test is no longer routinely recommended for men with no symptoms). Of course, you and your doctor may decide to screen more often for some diseases, based on your risk and co-morbidities (chronic disease you are already diagnosed with). Preventive services for you include:  - Medicare offers their members a free annual wellness visit, which is time for you and your primary care provider to discuss and plan for your preventive service needs. Take advantage of this benefit every year!  -All adults over age 72 should receive the recommended pneumonia vaccines. Current USPSTF guidelines recommend a series of two vaccines for the best pneumonia protection.   -All adults should have a flu vaccine yearly and tetanus vaccine every 10 years.  -All adults age 48 and older should receive the shingles vaccines (series of two vaccines).        -All adults age 38-68 who are overweight should have a diabetes screening test once every three years.   -Other screening tests & preventive services for persons with diabetes include: an eye exam to screen for diabetic retinopathy, a kidney function test, a foot exam, and stricter control over your cholesterol.   -Cardiovascular screening for adults with routine risk involves an electrocardiogram (ECG) at intervals determined by the provider.   -Colorectal cancer screening should be done for adults age 54-65 with no increased risk factors for colorectal cancer. There are a number of acceptable methods of screening for this type of cancer. Each test has its own benefits and drawbacks. Discuss with your provider what is most appropriate for you during your annual wellness visit. The different tests include: colonoscopy (considered the best screening method), a fecal occult blood test, a fecal DNA test, and sigmoidoscopy.  -All adults born between [facility] should be screened once for Hepatitis C.  -An Abdominal Aortic Aneurysm (AAA) Screening is recommended for men age 73-68 who has ever smoked in their lifetime.      Here is a list of your current Health Maintenance items (your personalized list of preventive services) with a due date:  Health Maintenance Due   Topic Date Due    DTaP/Tdap/Td  (1 - Tdap) Never done    Colorectal Screening  Never done    Shingles Vaccine (1 of 2) Never done    Pneumococcal Vaccine (2 - PCV) 03/17/2021    COVID-19 Vaccine (3 - Booster) 09/06/2021    Annual Well Visit  10/21/2021    Cholesterol Test   03/30/2022    Yearly Flu Vaccine (1) 09/01/2022

## 2022-09-06 NOTE — PROGRESS NOTES
HISTORY OF PRESENT ILLNESS  Jocelyn Mcdonough is a 74 y.o. male. HPI  Seen for Medicare wellness visit and follow up on meds. Since our last visit he has had a pacemaker placed for sick sinus syndrome. He has had a left axillary cyst resected, which was fortunately benign. He has had a recent back muscle pull that he thinks is improving. He remains on Irbesartan 300, Amlodipine 10, which he tolerates well. His weight is up from last October about 10 pounds and committed to trying to walk more regularly five days a week. Remains on Plavix for history of stroke and denies any recent bleeding. Does have some bruising. Social History:  Drinks perhaps three beers a week. No tobacco since over 50 years ago. Walking a mile a day. Looking forward to a trip to [facility] with his two older sons, hopefully next year. Review of Systems   Constitutional:  Negative for chills, diaphoresis, fever, malaise/fatigue and weight loss. Respiratory:  Negative for cough, shortness of breath and wheezing. Cardiovascular:  Negative for chest pain, palpitations, orthopnea, leg swelling and PND. Gastrointestinal:  Negative for abdominal pain, blood in stool, heartburn and nausea. Genitourinary:  Negative for hematuria. Musculoskeletal:  Positive for back pain. Negative for falls and myalgias. Neurological:  Negative for dizziness and headaches. Endo/Heme/Allergies:  Bruises/bleeds easily. Psychiatric/Behavioral:  Negative for depression and memory loss. Physical Exam  Vitals and nursing note reviewed. Constitutional:       Appearance: He is well-developed. HENT:      Head: Normocephalic and atraumatic. Neck:      Thyroid: No thyromegaly. Vascular: No carotid bruit. Cardiovascular:      Rate and Rhythm: Normal rate and regular rhythm. Heart sounds: Normal heart sounds. Pulmonary:      Effort: Pulmonary effort is normal. No respiratory distress. Breath sounds: Normal breath sounds.  No wheezing or rales. Musculoskeletal:      Cervical back: Normal range of motion and neck supple. Right lower leg: No edema. Left lower leg: No edema. Neurological:      Mental Status: He is alert and oriented to person, place, and time. Psychiatric:         Behavior: Behavior normal.       ASSESSMENT and PLAN  Diagnoses and all orders for this visit:    1. Medicare annual wellness visit, subsequent    2. HTN, goal below 130/80-cont meds and work on weight loss appt in 6mo     3. Dyslipidemia, goal LDL below 100-cont pravachol 80 mg  -     METABOLIC PANEL, COMPREHENSIVE; Future  -     LIPID PANEL; Future    4. SSS (sick sinus syndrome) (HCC)-sp  pacer doing well  -     CBC WITH AUTOMATED DIFF; Future    5. Encounter for immunization  -     Tianna Bobo, (AGE 10 YRS+), IM    Flu shot encouraged  Discussed pros and cons of psa will not pursue this at this point  reviewed diet, exercise and weight controlThis is the Subsequent Medicare Annual Wellness Exam, performed 12 months or more after the Initial AWV or the last Subsequent AWV    I have reviewed the patient's medical history in detail and updated the computerized patient record. Assessment/Plan   Education and counseling provided:  Are appropriate based on today's review and evaluation  Influenza Vaccine  Prostate cancer screening tests (PSA, covered annually)  Screening for glaucoma    1. Medicare annual wellness visit, subsequent  2. HTN, goal below 130/80  3. Dyslipidemia, goal LDL below 330  -     METABOLIC PANEL, COMPREHENSIVE; Future  -     LIPID PANEL; Future  4. SSS (sick sinus syndrome) (HCC)  -     CBC WITH AUTOMATED DIFF; Future  5.  Encounter for immunization  -     Tianna Bobo, (AGE 10 YRS+), IM       Depression Risk Factor Screening     3 most recent PHQ Screens 9/6/2022   Little interest or pleasure in doing things Not at all   Feeling down, depressed, irritable, or hopeless Not at all   Total Score PHQ 2 0       Alcohol & Drug Abuse Risk Screen    Do you average more than 1 drink per night or more than 7 drinks a week: No    In the past three months have you have had more than 4 drinks containing alcohol on one occasion: No          Functional Ability and Level of Safety    Hearing: Hearing is good. Activities of Daily Living: The home contains: no safety equipment. Patient does total self care      Ambulation: with no difficulty     Fall Risk:  Fall Risk Assessment, last 12 mths 8/2/2022   Able to walk? Yes   Fall in past 12 months? 0   Do you feel unsteady? 0   Are you worried about falling 0      Abuse Screen:  Patient is not abused       Cognitive Screening    Has your family/caregiver stated any concerns about your memory: no     Cognitive Screening: Normal - Verbal Fluency Test    Health Maintenance Due     Health Maintenance Due   Topic Date Due    DTaP/Tdap/Td series (1 - Tdap) Never done    Colorectal Cancer Screening Combo  Never done    Shingrix Vaccine Age 50> (1 of 2) Never done    Pneumococcal 65+ years (2 - PCV) 03/17/2021    COVID-19 Vaccine (3 - Booster) 09/06/2021    Medicare Yearly Exam  10/21/2021    Lipid Screen  03/30/2022    Flu Vaccine (1) 09/01/2022       Patient Care Team   Patient Care Team:  Bassam Villar MD as PCP - General (Internal Medicine Physician)  Bassam Villar MD as PCP - [facility] Provider  Sandar Mack MD as Physician (Cardiovascular Disease Physician)  Brock Swartz RN as Ambulatory Care Manager (Cardiovascular Disease Physician)    History     Patient Active Problem List   Diagnosis Code    High cholesterol E78.00    HTN (hypertension) I10    Obesity (BMI 30-39. 9) E66.9    Acute CVA (cerebrovascular accident) ([address] I63.9    Heart block I45.9    Intracranial atherosclerosis I67.2    S/P cardiac pacemaker procedure Z95.0    SSS (sick sinus syndrome) ([address] I49.5     Past Medical History:   Diagnosis Date    Hypercholesteremia     Hypertension     Migraines     Obesity (BMI 30-39.9) 3/25/2017    S/P cardiac pacemaker procedure 3/30/2021    For heart block dr dean 2018    Stroke [facility])       Past Surgical History:   Procedure Laterality Date    HX CYST REMOVAL Left 2022    Left Armpit    HX ORTHOPAEDIC      knees     Current Outpatient Medications   Medication Sig Dispense Refill    irbesartan (AVAPRO) 300 mg tablet TAKE ONE TABLET BY MOUTH ONCE NIGHTLY 90 Tablet 0    clopidogreL (PLAVIX) 75 mg tab Take 1 Tablet by mouth daily. 30 Tablet 0    pravastatin (PRAVACHOL) 80 mg tablet Take 1 Tablet by mouth in the morning. 90 Tablet 3    amLODIPine (NORVASC) 10 mg tablet TAKE ONE TABLET BY MOUTH DAILY 90 Tablet 1    aspirin 81 mg chewable tablet Take 1 Tab by mouth daily. Indications: Acute Coronary Syndrome      fluticasone propionate (FLONASE) 50 mcg/actuation nasal spray 2 Sprays by Both Nostrils route daily. (Patient not taking: Reported on 2022) 1 Each 2     Allergies   Allergen Reactions    Ace Inhibitors Rash    Erythromycin Hives     Pt states allergic to all mycins     Lipitor [Atorvastatin] Rash    Macrolide Antibiotics Hives    Niacin Rash    Other Medication Hives     All mycin medication groups/family        Family History   Problem Relation Age of Onset    Stroke Father     Migraines Sister     Stroke Mother     Lung Cancer Brother         smoker    Hypertension Neg Hx     Heart Disease Neg Hx      Social History     Tobacco Use    Smoking status: Former     Types: Cigarettes     Quit date:      Years since quittin.7    Smokeless tobacco: Never   Substance Use Topics    Alcohol use:  Yes     Alcohol/week: 2.0 standard drinks     Types: 2 Shots of liquor per week     Comment: Julia Lara MD

## 2022-10-10 NOTE — PROGRESS NOTES
Cardiac Electrophysiology Office Follow-up    NAME: Nella Mcdowell   :  1950  MRM:  [id number]    Date:  10/21/2022         Assessment and Plan:     1. S/P cardiac pacemaker   Device Interrogation:    Device: Medtronic dual chamber pacemaker  Battery/Longevity: 5.5 years  Programmed Mode: MVPR    RA lead    Intrinsic amplitude: 2.0 mV    Pacing impedance: 475 ohms    Pacing threshold: 0.75V @ 0.4ms    % Pacin.6  RV lead    Intrinsic amplitude:  1.1 mV    Pacing impedance:  437 ohms    Pacing threshold: 1.0V @ 0.4ms    %pacin.2    No AF/AT, VT/VF    Programming changes:  Iterative programming was performed to test the leads sensing and threshold parameters. RV sensitivity increased. 2. SSS (sick sinus syndrome)   3. Heart block  4. Primary hypertension. Controlled on irbesartan and amlodipine. 5. Prior CVA   6. High cholesterol. On statin. Continue Q3 month remote checks. Continue irbesartan and amlodipine for hypertension. Follow-up in clinic in one year, or sooner for any concerns. Subjective:     Nella Mcdowell, a 74y.o. year-old who presents for pacemaker follow-up. He has been doing well and denies chest pain, palpitations, dyspnea, dizziness or syncope. He is active with his grand kids without any issues.          Exam:     Physical Exam:  Visit Vitals  /70 (BP 1 Location: Right upper arm, BP Patient Position: Sitting, BP Cuff Size: Large adult)   Pulse 72   Resp 16   Ht 5' 11\" (1.803 m)   Wt 250 lb (113.4 kg)   SpO2 94%   BMI 34.87 kg/m²     PHYSICAL EXAM  General:  Alert and oriented, in no acute distress  Head:  Atraumatic, normocephalic  Eyes:  extraocular muscles intact  Neck:  Supple, normal range of motion  Lungs:  Clear to auscultation bilaterally, no wheezes/rales/rhonchi   Cardiovascular:  Regular rate and rhythm, normal S1-S2, no murmurs/rubs/gallops  Abdomen:  Soft, nontender, nondistended, normoactive bowel sounds  Skin:  Intact, no rash  Extremities:, no clubbing, cyanosis, or edema  Musculoskeletal: normal range of motion  Neurological:  Alert and oriented, no focal neurologic deficits  Psychiatric:  Normal mood and affect      Medications:     Current Outpatient Medications   Medication Sig    clopidogreL (PLAVIX) 75 mg tab TAKE ONE TABLET BY MOUTH DAILY    irbesartan (AVAPRO) 300 mg tablet TAKE ONE TABLET BY MOUTH ONCE NIGHTLY    pravastatin (PRAVACHOL) 80 mg tablet Take 1 Tablet by mouth in the morning. amLODIPine (NORVASC) 10 mg tablet TAKE ONE TABLET BY MOUTH DAILY    aspirin 81 mg chewable tablet Take 1 Tab by mouth daily. Indications: Acute Coronary Syndrome    fluticasone propionate (FLONASE) 50 mcg/actuation nasal spray 2 Sprays by Both Nostrils route daily. (Patient not taking: No sig reported)     No current facility-administered medications for this visit.       Diagnostic Data Review:          6/23/2017- Medtronic dual-chamber pacemaker per Dr. Teto Dao  6/23/17- Loop Recorder Removal per Dr. Teto Doa      Lab Review:     Lab Results   Component Value Date/Time    Cholesterol, total 176 09/06/2022 10:38 AM    HDL Cholesterol 52 09/06/2022 10:38 AM    LDL, calculated 92.6 09/06/2022 10:38 AM    Triglyceride 157 (H) 09/06/2022 10:38 AM    CHOL/HDL Ratio 3.4 09/06/2022 10:38 AM     Lab Results   Component Value Date/Time    Creatinine (POC) 1.1 03/22/2018 12:00 PM    Creatinine 1.02 09/06/2022 10:38 AM     Lab Results   Component Value Date/Time    BUN 18 09/06/2022 10:38 AM     Lab Results   Component Value Date/Time    Potassium 4.6 09/06/2022 10:38 AM     Lab Results   Component Value Date/Time    Hemoglobin A1c 6.5 (H) 03/26/2017 05:30 AM     Lab Results   Component Value Date/Time    HGB 17.5 (H) 09/06/2022 10:38 AM     Lab Results   Component Value Date/Time    PLATELET 404 41/98/1731 10:38 AM                  ___________________________________________________    Largt Narayanan NP  Cardiac Electrophysiology  [facility] [place]  [address] [facility], [address] [place] [address] Avenue                             [phone] [address] [facility].  [address] [place] [address], [id number] [facility]  [phone]

## 2022-10-21 ENCOUNTER — OFFICE VISIT (OUTPATIENT)
Dept: CARDIOLOGY CLINIC | Age: 72
End: 2022-10-21
Payer: MEDICARE

## 2022-10-21 VITALS
BODY MASS INDEX: 35 KG/M2 | SYSTOLIC BLOOD PRESSURE: 118 MMHG | DIASTOLIC BLOOD PRESSURE: 70 MMHG | HEIGHT: 71 IN | HEART RATE: 72 BPM | RESPIRATION RATE: 16 BRPM | WEIGHT: 250 LBS | OXYGEN SATURATION: 94 %

## 2022-10-21 DIAGNOSIS — Z95.0 S/P CARDIAC PACEMAKER PROCEDURE: Primary | ICD-10-CM

## 2022-10-21 DIAGNOSIS — Z95.0 CARDIAC PACEMAKER IN SITU: Primary | ICD-10-CM

## 2022-10-21 DIAGNOSIS — I10 PRIMARY HYPERTENSION: Chronic | ICD-10-CM

## 2022-10-21 DIAGNOSIS — I49.5 SSS (SICK SINUS SYNDROME) (HCC): ICD-10-CM

## 2022-10-21 DIAGNOSIS — I45.9 HEART BLOCK: ICD-10-CM

## 2022-10-21 DIAGNOSIS — I63.9 ACUTE CVA (CEREBROVASCULAR ACCIDENT) (HCC): ICD-10-CM

## 2022-10-21 DIAGNOSIS — E78.00 HIGH CHOLESTEROL: Chronic | ICD-10-CM

## 2022-10-21 PROCEDURE — 99213 OFFICE O/P EST LOW 20 MIN: CPT | Performed by: NURSE PRACTITIONER

## 2022-10-21 PROCEDURE — 93280 PM DEVICE PROGR EVAL DUAL: CPT | Performed by: INTERNAL MEDICINE

## 2022-10-21 PROCEDURE — G0463 HOSPITAL OUTPT CLINIC VISIT: HCPCS | Performed by: NURSE PRACTITIONER

## 2022-10-21 PROCEDURE — 1123F ACP DISCUSS/DSCN MKR DOCD: CPT | Performed by: NURSE PRACTITIONER

## 2022-10-21 NOTE — PROGRESS NOTES
Room #: 3    Chief Complaint   Patient presents with    Annual Exam    Other     SSS    Pacemaker Check     MDT       Visit Vitals  /70 (BP 1 Location: Right upper arm, BP Patient Position: Sitting, BP Cuff Size: Large adult)   Pulse 72   Resp 16   Ht 5' 11\" (1.803 m)   Wt 250 lb (113.4 kg)   SpO2 94%   BMI 34.87 kg/m²         Chest pain:  NO  Shortness of breath:  NO  Edema: NO  Palpitations, skipped beats, rapid heartbeat:  NO  Dizziness:  NO    1. Have you been to the ER, urgent care clinic since your last visit? Hospitalized since your last visit? No    2. Have you seen or consulted any other health care providers outside of the [address] [place] [address] since your last visit? Include any pap smears or colon screening.   Dr. Luevano Ards - cyst removal - [address] [facility]      Refills:  NO

## 2022-12-02 DIAGNOSIS — I10 HTN, GOAL BELOW 130/80: ICD-10-CM

## 2022-12-02 RX ORDER — IRBESARTAN 300 MG/1
TABLET ORAL
Qty: 90 TABLET | Refills: 0 | Status: SHIPPED | OUTPATIENT
Start: 2022-12-02

## 2022-12-02 RX ORDER — AMLODIPINE BESYLATE 10 MG/1
TABLET ORAL
Qty: 90 TABLET | Refills: 1 | Status: SHIPPED | OUTPATIENT
Start: 2022-12-02

## 2022-12-30 ENCOUNTER — PATIENT MESSAGE (OUTPATIENT)
Dept: CARDIOLOGY CLINIC | Age: 72
End: 2022-12-30

## 2023-01-17 ENCOUNTER — OFFICE VISIT (OUTPATIENT)
Dept: CARDIOLOGY CLINIC | Age: 73
End: 2023-01-17
Payer: MEDICARE

## 2023-01-17 DIAGNOSIS — Z95.0 CARDIAC PACEMAKER IN SITU: Primary | ICD-10-CM

## 2023-02-02 DIAGNOSIS — I10 HTN, GOAL BELOW 130/80: ICD-10-CM

## 2023-02-02 DIAGNOSIS — R29.810 FACIAL DROOP: ICD-10-CM

## 2023-02-02 DIAGNOSIS — I63.9 ACUTE CVA (CEREBROVASCULAR ACCIDENT) (HCC): ICD-10-CM

## 2023-02-02 RX ORDER — IRBESARTAN 300 MG/1
TABLET ORAL
Qty: 90 TABLET | Refills: 0 | Status: SHIPPED | OUTPATIENT
Start: 2023-02-02

## 2023-02-02 RX ORDER — CLOPIDOGREL BISULFATE 75 MG/1
TABLET ORAL
Qty: 90 TABLET | Refills: 1 | Status: SHIPPED | OUTPATIENT
Start: 2023-02-02

## 2023-04-06 ENCOUNTER — OFFICE VISIT (OUTPATIENT)
Dept: INTERNAL MEDICINE CLINIC | Age: 73
End: 2023-04-06
Payer: MEDICARE

## 2023-04-06 PROCEDURE — G8432 DEP SCR NOT DOC, RNG: HCPCS | Performed by: INTERNAL MEDICINE

## 2023-04-06 PROCEDURE — G8427 DOCREV CUR MEDS BY ELIG CLIN: HCPCS | Performed by: INTERNAL MEDICINE

## 2023-04-06 PROCEDURE — 1101F PT FALLS ASSESS-DOCD LE1/YR: CPT | Performed by: INTERNAL MEDICINE

## 2023-04-06 PROCEDURE — G8417 CALC BMI ABV UP PARAM F/U: HCPCS | Performed by: INTERNAL MEDICINE

## 2023-04-06 PROCEDURE — 99214 OFFICE O/P EST MOD 30 MIN: CPT | Performed by: INTERNAL MEDICINE

## 2023-04-06 PROCEDURE — 3017F COLORECTAL CA SCREEN DOC REV: CPT | Performed by: INTERNAL MEDICINE

## 2023-04-06 PROCEDURE — G8536 NO DOC ELDER MAL SCRN: HCPCS | Performed by: INTERNAL MEDICINE

## 2023-04-06 NOTE — PROGRESS NOTES
HISTORY OF PRESENT ILLNESS  Kaitlin James is a 74 y.o. male. HPI   Dictation on: 04/06/2023  9:51 AM by: Earline Mitchell     Review of Systems   Constitutional:  Negative for chills, diaphoresis, fever, malaise/fatigue and weight loss. Respiratory:  Negative for cough, shortness of breath and wheezing. Cardiovascular:  Negative for chest pain, palpitations, orthopnea, leg swelling and PND. Gastrointestinal:  Negative for abdominal pain, heartburn and nausea. Musculoskeletal:  Negative for myalgias. Neurological:  Negative for dizziness, loss of consciousness and headaches. Physical Exam  Vitals and nursing note reviewed. Constitutional:       Appearance: He is well-developed. HENT:      Head: Normocephalic and atraumatic. Neck:      Thyroid: No thyromegaly. Vascular: No carotid bruit. Cardiovascular:      Rate and Rhythm: Normal rate and regular rhythm. Heart sounds: Normal heart sounds. Pulmonary:      Effort: Pulmonary effort is normal. No respiratory distress. Breath sounds: Normal breath sounds. No wheezing or rales. Musculoskeletal:      Cervical back: Normal range of motion and neck supple. Right lower leg: Edema present. Left lower leg: Edema (trace) present. Neurological:      Mental Status: He is alert and oriented to person, place, and time. Psychiatric:         Behavior: Behavior normal.       ASSESSMENT and PLAN  Diagnoses and all orders for this visit:    1. HTN, goal below 130/80  Stable on meds  2. SSS (sick sinus syndrome) (HCC)-sp pacer  -     CBC WITH AUTOMATED DIFF; Future    3. Dyslipidemia, goal LDL below 70  -     METABOLIC PANEL, COMPREHENSIVE; Future  -     LIPID PANEL; Future    4. IFG (impaired fasting glucose)  -     HEMOGLOBIN A1C WITH EAG; Future  The patient is asked to make an attempt to improve diet and exercise patterns to aid in medical management of this problem.   Appt in 6mo

## 2023-06-23 ENCOUNTER — TELEPHONE (OUTPATIENT)
Age: 73
End: 2023-06-23

## 2023-07-18 RX ORDER — PRAVASTATIN SODIUM 80 MG/1
TABLET ORAL
Qty: 90 TABLET | Refills: 1 | Status: SHIPPED | OUTPATIENT
Start: 2023-07-18

## 2023-08-03 DIAGNOSIS — I63.9 ACUTE CVA (CEREBROVASCULAR ACCIDENT) (HCC): Primary | ICD-10-CM

## 2023-08-03 RX ORDER — CLOPIDOGREL BISULFATE 75 MG/1
TABLET ORAL
Qty: 90 TABLET | Refills: 1 | Status: SHIPPED | OUTPATIENT
Start: 2023-08-03

## 2023-08-15 ENCOUNTER — TELEPHONE (OUTPATIENT)
Age: 73
End: 2023-08-15

## 2023-08-15 NOTE — TELEPHONE ENCOUNTER
Pt calling with questions about his device. Pt stated he wants to know if we received his transmission.      Pt # [phone]

## 2023-08-16 NOTE — TELEPHONE ENCOUNTER
Spoke to pt about his Medtronic [employer]link remote. He just received a new unit & when he tries sending it is giving him an error still. Told pt to try picking up the handheld piece then putting it back in the base nice & flat again. Let it charge for couple hours then try sending a juan transmission again. If he still gets an error code & not todays date with the check mita then he will need to call [employer] tech support [phone]. Pt stated he has ph# & understands. He will call back with update.

## 2023-08-31 DIAGNOSIS — I10 ESSENTIAL (PRIMARY) HYPERTENSION: Primary | ICD-10-CM

## 2023-08-31 RX ORDER — AMLODIPINE BESYLATE 10 MG/1
TABLET ORAL
Qty: 90 TABLET | Refills: 1 | Status: SHIPPED | OUTPATIENT
Start: 2023-08-31

## 2023-09-20 ENCOUNTER — OFFICE VISIT (OUTPATIENT)
Age: 73
End: 2023-09-20
Payer: MEDICARE

## 2023-09-20 VITALS
WEIGHT: 244 LBS | OXYGEN SATURATION: 93 % | HEIGHT: 71 IN | DIASTOLIC BLOOD PRESSURE: 76 MMHG | BODY MASS INDEX: 34.16 KG/M2 | SYSTOLIC BLOOD PRESSURE: 138 MMHG | HEART RATE: 91 BPM

## 2023-09-20 DIAGNOSIS — Z95.0 S/P CARDIAC PACEMAKER PROCEDURE: ICD-10-CM

## 2023-09-20 DIAGNOSIS — I49.5 SSS (SICK SINUS SYNDROME) (HCC): ICD-10-CM

## 2023-09-20 DIAGNOSIS — I63.9 ACUTE CVA (CEREBROVASCULAR ACCIDENT) (HCC): ICD-10-CM

## 2023-09-20 DIAGNOSIS — I10 ESSENTIAL (PRIMARY) HYPERTENSION: Primary | ICD-10-CM

## 2023-09-20 PROCEDURE — 93010 ELECTROCARDIOGRAM REPORT: CPT | Performed by: INTERNAL MEDICINE

## 2023-09-20 PROCEDURE — 93005 ELECTROCARDIOGRAM TRACING: CPT | Performed by: INTERNAL MEDICINE

## 2023-09-20 PROCEDURE — 3078F DIAST BP <80 MM HG: CPT | Performed by: INTERNAL MEDICINE

## 2023-09-20 PROCEDURE — 99214 OFFICE O/P EST MOD 30 MIN: CPT | Performed by: INTERNAL MEDICINE

## 2023-09-20 PROCEDURE — 1123F ACP DISCUSS/DSCN MKR DOCD: CPT | Performed by: INTERNAL MEDICINE

## 2023-09-20 PROCEDURE — G8427 DOCREV CUR MEDS BY ELIG CLIN: HCPCS | Performed by: INTERNAL MEDICINE

## 2023-09-20 PROCEDURE — 1036F TOBACCO NON-USER: CPT | Performed by: INTERNAL MEDICINE

## 2023-09-20 PROCEDURE — 3017F COLORECTAL CA SCREEN DOC REV: CPT | Performed by: INTERNAL MEDICINE

## 2023-09-20 PROCEDURE — G8417 CALC BMI ABV UP PARAM F/U: HCPCS | Performed by: INTERNAL MEDICINE

## 2023-09-20 PROCEDURE — 3075F SYST BP GE 130 - 139MM HG: CPT | Performed by: INTERNAL MEDICINE

## 2023-10-03 ENCOUNTER — OFFICE VISIT (OUTPATIENT)
Age: 73
End: 2023-10-03
Payer: MEDICARE

## 2023-10-03 VITALS
DIASTOLIC BLOOD PRESSURE: 75 MMHG | TEMPERATURE: 98.4 F | HEIGHT: 71 IN | WEIGHT: 243.4 LBS | BODY MASS INDEX: 34.07 KG/M2 | HEART RATE: 83 BPM | OXYGEN SATURATION: 96 % | SYSTOLIC BLOOD PRESSURE: 124 MMHG | RESPIRATION RATE: 16 BRPM

## 2023-10-03 DIAGNOSIS — I63.9 CRYPTOGENIC STROKE (HCC): ICD-10-CM

## 2023-10-03 DIAGNOSIS — Z13.6 SCREENING FOR AAA (ABDOMINAL AORTIC ANEURYSM): ICD-10-CM

## 2023-10-03 DIAGNOSIS — Z00.00 MEDICARE ANNUAL WELLNESS VISIT, SUBSEQUENT: Primary | ICD-10-CM

## 2023-10-03 DIAGNOSIS — R73.01 IFG (IMPAIRED FASTING GLUCOSE): ICD-10-CM

## 2023-10-03 DIAGNOSIS — E78.5 DYSLIPIDEMIA, GOAL LDL BELOW 70: ICD-10-CM

## 2023-10-03 DIAGNOSIS — I10 HTN, GOAL BELOW 130/80: ICD-10-CM

## 2023-10-03 DIAGNOSIS — Z12.5 PROSTATE CANCER SCREENING: ICD-10-CM

## 2023-10-03 DIAGNOSIS — Z12.11 COLON CANCER SCREENING: ICD-10-CM

## 2023-10-03 LAB
ALBUMIN SERPL-MCNC: 3.8 G/DL (ref 3.5–5)
ALBUMIN/GLOB SERPL: 1 (ref 1.1–2.2)
ALP SERPL-CCNC: 99 U/L (ref 45–117)
ALT SERPL-CCNC: 21 U/L (ref 12–78)
ANION GAP SERPL CALC-SCNC: 5 MMOL/L (ref 5–15)
AST SERPL-CCNC: 13 U/L (ref 15–37)
BILIRUB SERPL-MCNC: 1 MG/DL (ref 0.2–1)
BUN SERPL-MCNC: 12 MG/DL (ref 6–20)
BUN/CREAT SERPL: 11 (ref 12–20)
CALCIUM SERPL-MCNC: 9.3 MG/DL (ref 8.5–10.1)
CHLORIDE SERPL-SCNC: 105 MMOL/L (ref 97–108)
CHOLEST SERPL-MCNC: 162 MG/DL
CO2 SERPL-SCNC: 29 MMOL/L (ref 21–32)
CREAT SERPL-MCNC: 1.09 MG/DL (ref 0.7–1.3)
EST. AVERAGE GLUCOSE BLD GHB EST-MCNC: 128 MG/DL
GLOBULIN SER CALC-MCNC: 3.7 G/DL (ref 2–4)
GLUCOSE SERPL-MCNC: 127 MG/DL (ref 65–100)
HBA1C MFR BLD: 6.1 % (ref 4–5.6)
HDLC SERPL-MCNC: 51 MG/DL
HDLC SERPL: 3.2 (ref 0–5)
LDLC SERPL CALC-MCNC: 82.6 MG/DL (ref 0–100)
POTASSIUM SERPL-SCNC: 4.3 MMOL/L (ref 3.5–5.1)
PROT SERPL-MCNC: 7.5 G/DL (ref 6.4–8.2)
PSA SERPL-MCNC: 4.4 NG/ML (ref 0.01–4)
SODIUM SERPL-SCNC: 139 MMOL/L (ref 136–145)
TRIGL SERPL-MCNC: 142 MG/DL
VLDLC SERPL CALC-MCNC: 28.4 MG/DL

## 2023-10-03 PROCEDURE — 1036F TOBACCO NON-USER: CPT | Performed by: INTERNAL MEDICINE

## 2023-10-03 PROCEDURE — 3074F SYST BP LT 130 MM HG: CPT | Performed by: INTERNAL MEDICINE

## 2023-10-03 PROCEDURE — G8427 DOCREV CUR MEDS BY ELIG CLIN: HCPCS | Performed by: INTERNAL MEDICINE

## 2023-10-03 PROCEDURE — 3078F DIAST BP <80 MM HG: CPT | Performed by: INTERNAL MEDICINE

## 2023-10-03 PROCEDURE — 99214 OFFICE O/P EST MOD 30 MIN: CPT | Performed by: INTERNAL MEDICINE

## 2023-10-03 PROCEDURE — G0439 PPPS, SUBSEQ VISIT: HCPCS | Performed by: INTERNAL MEDICINE

## 2023-10-03 PROCEDURE — 3017F COLORECTAL CA SCREEN DOC REV: CPT | Performed by: INTERNAL MEDICINE

## 2023-10-03 PROCEDURE — G8484 FLU IMMUNIZE NO ADMIN: HCPCS | Performed by: INTERNAL MEDICINE

## 2023-10-03 PROCEDURE — 1123F ACP DISCUSS/DSCN MKR DOCD: CPT | Performed by: INTERNAL MEDICINE

## 2023-10-03 PROCEDURE — G8417 CALC BMI ABV UP PARAM F/U: HCPCS | Performed by: INTERNAL MEDICINE

## 2023-10-03 RX ORDER — MELOXICAM 7.5 MG/1
7.5 TABLET ORAL DAILY
COMMUNITY
End: 2023-10-03 | Stop reason: ALTCHOICE

## 2023-10-03 SDOH — ECONOMIC STABILITY: FOOD INSECURITY: WITHIN THE PAST 12 MONTHS, YOU WORRIED THAT YOUR FOOD WOULD RUN OUT BEFORE YOU GOT MONEY TO BUY MORE.: NEVER TRUE

## 2023-10-03 SDOH — ECONOMIC STABILITY: FOOD INSECURITY: WITHIN THE PAST 12 MONTHS, THE FOOD YOU BOUGHT JUST DIDN'T LAST AND YOU DIDN'T HAVE MONEY TO GET MORE.: NEVER TRUE

## 2023-10-03 SDOH — ECONOMIC STABILITY: HOUSING INSECURITY
IN THE LAST 12 MONTHS, WAS THERE A TIME WHEN YOU DID NOT HAVE A STEADY PLACE TO SLEEP OR SLEPT IN A SHELTER (INCLUDING NOW)?: NO

## 2023-10-03 SDOH — ECONOMIC STABILITY: INCOME INSECURITY: HOW HARD IS IT FOR YOU TO PAY FOR THE VERY BASICS LIKE FOOD, HOUSING, MEDICAL CARE, AND HEATING?: NOT HARD AT ALL

## 2023-10-03 ASSESSMENT — PATIENT HEALTH QUESTIONNAIRE - PHQ9
SUM OF ALL RESPONSES TO PHQ QUESTIONS 1-9: 0
1. LITTLE INTEREST OR PLEASURE IN DOING THINGS: 0
SUM OF ALL RESPONSES TO PHQ9 QUESTIONS 1 & 2: 0
SUM OF ALL RESPONSES TO PHQ QUESTIONS 1-9: 0
2. FEELING DOWN, DEPRESSED OR HOPELESS: 0

## 2023-10-03 ASSESSMENT — LIFESTYLE VARIABLES
HOW OFTEN DO YOU HAVE A DRINK CONTAINING ALCOHOL: 2-3 TIMES A WEEK
HOW MANY STANDARD DRINKS CONTAINING ALCOHOL DO YOU HAVE ON A TYPICAL DAY: 1 OR 2

## 2023-10-03 NOTE — PROGRESS NOTES
Arielle Aldana (:  1950) is a 74 y.o. male,Established patient, here for evaluation of the following chief complaint(s):  Blood Work, Medicare AWV, Hypertension, and Cholesterol Problem         ASSESSMENT/PLAN:  1. Medicare annual wellness visit, subsequent  2. HTN, goal below 130/80-continue 2 meds controlled  3. Dyslipidemia, goal LDL below 70-cont the pravachol 80 mg  -     Lipid Panel; Future  -     Comprehensive Metabolic Panel; Future  4. Cryptogenic stroke (HCC)-cont plavix and asa and avoid mobic and other nsaids  5. Colon cancer screening  -     Cologuard (Fecal DNA Colorectal Cancer Screening)  6. Prostate cancer screening  -     PSA Screening; Future  7. IFG (impaired fasting glucose)-encouraged exercise  -     Hemoglobin A1C; Future  8. Screening for AAA (abdominal aortic aneurysm)  -      ABDOMINAL AORTA LIMITED; Future      No follow-ups on file. Subjective   SUBJECTIVE/OBJECTIVE:  Hypertension      Seen for Medicare wellness visit. He is feeling generally very well walks for exercise and does most of the shopping in his house exercises at least 30 minutes at a time without any cardiac symptoms. History of cryptogenic stroke with no residual concerns no weakness no numbness no trouble swallowing. Hypertension on irbesartan 300 amlodipine 10 no constipation no headaches no dizzy spells. Arthritis of hand was recently given meloxicam by another provider I have advised against this as he is on Plavix and aspirin chronically. Can use topical diclofenac safely. Social history  for sons who live in the area drinks beer and rum but no more than 3 glasses a week    Preventive care Cologuard discussed he is interested PSA discussed he is interested advised Prevnar 20 and new COVID-vaccine. Review of Systems       Objective   Physical Exam  Constitutional:       Appearance: Normal appearance. HENT:      Head: Normocephalic and atraumatic.       Right Ear: Tympanic

## 2023-10-06 ENCOUNTER — TELEPHONE (OUTPATIENT)
Age: 73
End: 2023-10-06

## 2023-10-06 DIAGNOSIS — Z13.6 SCREENING FOR AAA (ABDOMINAL AORTIC ANEURYSM): Primary | ICD-10-CM

## 2023-10-06 NOTE — TELEPHONE ENCOUNTER
Patient said he reached on to [address] and they told him they have no information or any orders as to why he would need imaging done. So the patient wants to know why he would need it.      Shady Acuña [phone] [facility])

## 2023-10-06 NOTE — TELEPHONE ENCOUNTER
The incorrect order for the AAA screening was placed & needs to be changed. Correct order placed in epic.

## 2023-10-12 ENCOUNTER — HOSPITAL ENCOUNTER (OUTPATIENT)
Facility: HOSPITAL | Age: 73
Discharge: HOME | End: 2023-10-12
Attending: INTERNAL MEDICINE
Payer: MEDICARE

## 2023-10-12 DIAGNOSIS — Z13.6 SCREENING FOR AAA (ABDOMINAL AORTIC ANEURYSM): ICD-10-CM

## 2023-10-12 PROCEDURE — 9900000021 US VASCULAR SCREENING

## 2023-10-13 LAB — NONINV COLON CA DNA+OCC BLD SCRN STL QL: NEGATIVE

## 2023-10-16 ENCOUNTER — TELEPHONE (OUTPATIENT)
Age: 73
End: 2023-10-16

## 2023-10-16 NOTE — TELEPHONE ENCOUNTER
----- Message from German Mcdaniel LPN sent at 95/79/8697 11:47 AM EDT -----    ----- Message -----  From: Kenya Tolliver MD  Sent: 10/13/2023   7:21 AM EDT  To: German Mcdaniel LPN    Notify no Len Gonzalez

## 2023-10-29 RX ORDER — IRBESARTAN 300 MG/1
300 TABLET ORAL
Qty: 90 TABLET | Refills: 2 | Status: SHIPPED | OUTPATIENT
Start: 2023-10-29

## 2023-11-01 ENCOUNTER — OFFICE VISIT (OUTPATIENT)
Age: 73
End: 2023-11-01
Payer: MEDICARE

## 2023-11-01 ENCOUNTER — PROCEDURE VISIT (OUTPATIENT)
Age: 73
End: 2023-11-01
Payer: MEDICARE

## 2023-11-01 VITALS
WEIGHT: 246 LBS | HEART RATE: 87 BPM | DIASTOLIC BLOOD PRESSURE: 70 MMHG | SYSTOLIC BLOOD PRESSURE: 138 MMHG | BODY MASS INDEX: 34.44 KG/M2 | HEIGHT: 71 IN | OXYGEN SATURATION: 93 %

## 2023-11-01 DIAGNOSIS — I45.9 HEART BLOCK: Primary | ICD-10-CM

## 2023-11-01 DIAGNOSIS — E66.9 OBESITY (BMI 30-39.9): ICD-10-CM

## 2023-11-01 DIAGNOSIS — I63.9 CRYPTOGENIC STROKE (HCC): ICD-10-CM

## 2023-11-01 DIAGNOSIS — I10 ESSENTIAL (PRIMARY) HYPERTENSION: ICD-10-CM

## 2023-11-01 DIAGNOSIS — Z95.0 PRESENCE OF CARDIAC PACEMAKER: Primary | ICD-10-CM

## 2023-11-01 DIAGNOSIS — E78.00 HIGH CHOLESTEROL: ICD-10-CM

## 2023-11-01 DIAGNOSIS — Z95.0 S/P CARDIAC PACEMAKER PROCEDURE: ICD-10-CM

## 2023-11-01 DIAGNOSIS — I49.5 SSS (SICK SINUS SYNDROME) (HCC): ICD-10-CM

## 2023-11-01 PROCEDURE — 3017F COLORECTAL CA SCREEN DOC REV: CPT | Performed by: INTERNAL MEDICINE

## 2023-11-01 PROCEDURE — G8427 DOCREV CUR MEDS BY ELIG CLIN: HCPCS | Performed by: INTERNAL MEDICINE

## 2023-11-01 PROCEDURE — 3078F DIAST BP <80 MM HG: CPT | Performed by: INTERNAL MEDICINE

## 2023-11-01 PROCEDURE — 99214 OFFICE O/P EST MOD 30 MIN: CPT | Performed by: INTERNAL MEDICINE

## 2023-11-01 PROCEDURE — G8484 FLU IMMUNIZE NO ADMIN: HCPCS | Performed by: INTERNAL MEDICINE

## 2023-11-01 PROCEDURE — 1036F TOBACCO NON-USER: CPT | Performed by: INTERNAL MEDICINE

## 2023-11-01 PROCEDURE — 3075F SYST BP GE 130 - 139MM HG: CPT | Performed by: INTERNAL MEDICINE

## 2023-11-01 PROCEDURE — G8417 CALC BMI ABV UP PARAM F/U: HCPCS | Performed by: INTERNAL MEDICINE

## 2023-11-01 PROCEDURE — 93280 PM DEVICE PROGR EVAL DUAL: CPT | Performed by: INTERNAL MEDICINE

## 2023-11-01 PROCEDURE — 1123F ACP DISCUSS/DSCN MKR DOCD: CPT | Performed by: INTERNAL MEDICINE

## 2024-01-11 RX ORDER — PRAVASTATIN SODIUM 80 MG/1
TABLET ORAL
Qty: 90 TABLET | Refills: 2 | Status: SHIPPED | OUTPATIENT
Start: 2024-01-11

## 2024-01-11 NOTE — TELEPHONE ENCOUNTER
Per verbal order from Dr. Maricruz Velásquez  Last appt: 11/1/2023     Future Appointments   Date Time Provider Department Center   4/3/2024  8:00 AM Jessi Nuñez MD [facility]   9/25/2024  9:00 AM Maricruz Velásquez MD [facility]   11/5/2024  9:00 [facility]   11/5/2024  9:20 AM Hannah Ambrocio APRN - [facility]       Requested Prescriptions     Signed Prescriptions Disp Refills    pravastatin (PRAVACHOL) 80 MG tablet 90 tablet 2     Sig: TAKE ONE TABLET BY MOUTH EVERY MORNING     Authorizing Provider: MARICRZU VELÁSQUEZ     Ordering User: APRIL STEELE

## 2024-01-28 DIAGNOSIS — I63.9 ACUTE CVA (CEREBROVASCULAR ACCIDENT) (HCC): ICD-10-CM

## 2024-01-29 RX ORDER — CLOPIDOGREL BISULFATE 75 MG/1
75 TABLET ORAL DAILY
Qty: 90 TABLET | Refills: 1 | Status: SHIPPED | OUTPATIENT
Start: 2024-01-29

## 2024-02-07 DIAGNOSIS — I10 ESSENTIAL (PRIMARY) HYPERTENSION: ICD-10-CM

## 2024-02-07 RX ORDER — AMLODIPINE BESYLATE 10 MG/1
10 TABLET ORAL DAILY
Qty: 90 TABLET | Refills: 1 | Status: SHIPPED | OUTPATIENT
Start: 2024-02-07

## 2024-04-03 ENCOUNTER — OFFICE VISIT (OUTPATIENT)
Age: 74
End: 2024-04-03
Payer: MEDICARE

## 2024-04-03 VITALS
HEIGHT: 71 IN | TEMPERATURE: 97.9 F | BODY MASS INDEX: 34.69 KG/M2 | RESPIRATION RATE: 16 BRPM | OXYGEN SATURATION: 92 % | WEIGHT: 247.8 LBS | SYSTOLIC BLOOD PRESSURE: 118 MMHG | HEART RATE: 79 BPM | DIASTOLIC BLOOD PRESSURE: 75 MMHG

## 2024-04-03 DIAGNOSIS — I49.5 SSS (SICK SINUS SYNDROME) (HCC): ICD-10-CM

## 2024-04-03 DIAGNOSIS — I10 HTN, GOAL BELOW 130/80: Primary | ICD-10-CM

## 2024-04-03 DIAGNOSIS — E78.5 DYSLIPIDEMIA, GOAL LDL BELOW 70: ICD-10-CM

## 2024-04-03 DIAGNOSIS — M71.332 SYNOVIAL CYST OF WRIST, LEFT: ICD-10-CM

## 2024-04-03 DIAGNOSIS — I63.9 CRYPTOGENIC STROKE (HCC): ICD-10-CM

## 2024-04-03 PROCEDURE — 1123F ACP DISCUSS/DSCN MKR DOCD: CPT | Performed by: INTERNAL MEDICINE

## 2024-04-03 PROCEDURE — 99214 OFFICE O/P EST MOD 30 MIN: CPT | Performed by: INTERNAL MEDICINE

## 2024-04-03 PROCEDURE — G8428 CUR MEDS NOT DOCUMENT: HCPCS | Performed by: INTERNAL MEDICINE

## 2024-04-03 PROCEDURE — 3074F SYST BP LT 130 MM HG: CPT | Performed by: INTERNAL MEDICINE

## 2024-04-03 PROCEDURE — G8417 CALC BMI ABV UP PARAM F/U: HCPCS | Performed by: INTERNAL MEDICINE

## 2024-04-03 PROCEDURE — 3017F COLORECTAL CA SCREEN DOC REV: CPT | Performed by: INTERNAL MEDICINE

## 2024-04-03 PROCEDURE — 3078F DIAST BP <80 MM HG: CPT | Performed by: INTERNAL MEDICINE

## 2024-04-03 PROCEDURE — 1036F TOBACCO NON-USER: CPT | Performed by: INTERNAL MEDICINE

## 2024-04-03 RX ORDER — ROSUVASTATIN CALCIUM 10 MG/1
10 TABLET, COATED ORAL DAILY
Qty: 90 TABLET | Refills: 1 | Status: SHIPPED | OUTPATIENT
Start: 2024-04-03

## 2024-04-03 ASSESSMENT — PATIENT HEALTH QUESTIONNAIRE - PHQ9
1. LITTLE INTEREST OR PLEASURE IN DOING THINGS: NOT AT ALL
2. FEELING DOWN, DEPRESSED OR HOPELESS: NOT AT ALL
SUM OF ALL RESPONSES TO PHQ QUESTIONS 1-9: 0
SUM OF ALL RESPONSES TO PHQ QUESTIONS 1-9: 0
SUM OF ALL RESPONSES TO PHQ9 QUESTIONS 1 & 2: 0
SUM OF ALL RESPONSES TO PHQ QUESTIONS 1-9: 0
SUM OF ALL RESPONSES TO PHQ QUESTIONS 1-9: 0

## 2024-06-24 DIAGNOSIS — E78.5 DYSLIPIDEMIA, GOAL LDL BELOW 70: ICD-10-CM

## 2024-06-24 LAB
ALBUMIN SERPL-MCNC: 3.6 G/DL (ref 3.5–5)
ALBUMIN/GLOB SERPL: 0.9 (ref 1.1–2.2)
ALP SERPL-CCNC: 106 U/L (ref 45–117)
ALT SERPL-CCNC: 17 U/L (ref 12–78)
ANION GAP SERPL CALC-SCNC: 3 MMOL/L (ref 5–15)
AST SERPL-CCNC: 19 U/L (ref 15–37)
BILIRUB SERPL-MCNC: 0.5 MG/DL (ref 0.2–1)
BUN SERPL-MCNC: 17 MG/DL (ref 6–20)
BUN/CREAT SERPL: 15 (ref 12–20)
CALCIUM SERPL-MCNC: 8.9 MG/DL (ref 8.5–10.1)
CHLORIDE SERPL-SCNC: 105 MMOL/L (ref 97–108)
CHOLEST SERPL-MCNC: 137 MG/DL
CO2 SERPL-SCNC: 31 MMOL/L (ref 21–32)
CREAT SERPL-MCNC: 1.11 MG/DL (ref 0.7–1.3)
GLOBULIN SER CALC-MCNC: 3.8 G/DL (ref 2–4)
GLUCOSE SERPL-MCNC: 180 MG/DL (ref 65–100)
HDLC SERPL-MCNC: 48 MG/DL
HDLC SERPL: 2.9 (ref 0–5)
LDLC SERPL CALC-MCNC: 57.6 MG/DL (ref 0–100)
POTASSIUM SERPL-SCNC: 4.5 MMOL/L (ref 3.5–5.1)
PROT SERPL-MCNC: 7.4 G/DL (ref 6.4–8.2)
SODIUM SERPL-SCNC: 139 MMOL/L (ref 136–145)
TRIGL SERPL-MCNC: 157 MG/DL
VLDLC SERPL CALC-MCNC: 31.4 MG/DL

## 2024-06-25 DIAGNOSIS — R73.01 IFG (IMPAIRED FASTING GLUCOSE): Primary | ICD-10-CM

## 2024-06-25 DIAGNOSIS — R73.01 IFG (IMPAIRED FASTING GLUCOSE): ICD-10-CM

## 2024-06-25 LAB
EST. AVERAGE GLUCOSE BLD GHB EST-MCNC: 140 MG/DL
HBA1C MFR BLD: 6.5 % (ref 4–5.6)

## 2024-06-28 ENCOUNTER — OFFICE VISIT (OUTPATIENT)
Age: 74
End: 2024-06-28
Payer: MEDICARE

## 2024-06-28 VITALS
DIASTOLIC BLOOD PRESSURE: 80 MMHG | TEMPERATURE: 98.4 F | SYSTOLIC BLOOD PRESSURE: 137 MMHG | OXYGEN SATURATION: 97 % | RESPIRATION RATE: 20 BRPM | BODY MASS INDEX: 35.25 KG/M2 | HEIGHT: 71 IN | WEIGHT: 251.8 LBS | HEART RATE: 75 BPM

## 2024-06-28 DIAGNOSIS — M67.432 GANGLION CYST OF VOLAR ASPECT OF LEFT WRIST: ICD-10-CM

## 2024-06-28 DIAGNOSIS — E11.9 DIABETES MELLITUS, NEW ONSET (HCC): Primary | ICD-10-CM

## 2024-06-28 PROCEDURE — 3017F COLORECTAL CA SCREEN DOC REV: CPT | Performed by: INTERNAL MEDICINE

## 2024-06-28 PROCEDURE — 1036F TOBACCO NON-USER: CPT | Performed by: INTERNAL MEDICINE

## 2024-06-28 PROCEDURE — 3075F SYST BP GE 130 - 139MM HG: CPT | Performed by: INTERNAL MEDICINE

## 2024-06-28 PROCEDURE — 99213 OFFICE O/P EST LOW 20 MIN: CPT | Performed by: INTERNAL MEDICINE

## 2024-06-28 PROCEDURE — 1123F ACP DISCUSS/DSCN MKR DOCD: CPT | Performed by: INTERNAL MEDICINE

## 2024-06-28 PROCEDURE — 3044F HG A1C LEVEL LT 7.0%: CPT | Performed by: INTERNAL MEDICINE

## 2024-06-28 PROCEDURE — G8427 DOCREV CUR MEDS BY ELIG CLIN: HCPCS | Performed by: INTERNAL MEDICINE

## 2024-06-28 PROCEDURE — 3078F DIAST BP <80 MM HG: CPT | Performed by: INTERNAL MEDICINE

## 2024-06-28 PROCEDURE — 2022F DILAT RTA XM EVC RTNOPTHY: CPT | Performed by: INTERNAL MEDICINE

## 2024-06-28 PROCEDURE — G8417 CALC BMI ABV UP PARAM F/U: HCPCS | Performed by: INTERNAL MEDICINE

## 2024-06-28 ASSESSMENT — PATIENT HEALTH QUESTIONNAIRE - PHQ9
2. FEELING DOWN, DEPRESSED OR HOPELESS: NOT AT ALL
SUM OF ALL RESPONSES TO PHQ QUESTIONS 1-9: 0
1. LITTLE INTEREST OR PLEASURE IN DOING THINGS: NOT AT ALL
SUM OF ALL RESPONSES TO PHQ9 QUESTIONS 1 & 2: 0
SUM OF ALL RESPONSES TO PHQ QUESTIONS 1-9: 0

## 2024-06-28 NOTE — PROGRESS NOTES
Identified pt with two pt identifiers(name and ). Reviewed record in preparation for visit and have obtained necessary documentation. All patient medications has been reviewed.  Chief Complaint   Patient presents with    Discuss Labs       Vitals:    24 0931   BP: 137/80   Pulse:    Resp:    Temp:    SpO2:                    Coordination of Care Questionnaire:   1) Have you been to an emergency room, urgent care, or hospitalized since your last visit?   No       2. Have seen or consulted any other health care provider since your last visit? No        Patient is accompanied by self I have received verbal consent from Giovanni Flaherty to discuss any/all medical information while they are present in the room.

## 2024-06-28 NOTE — PROGRESS NOTES
Giovanni Flaherty (:  1950) is a 73 y.o. male,Established patient, here for evaluation of the following chief complaint(s):  Discuss Labs      Assessment & Plan   1. Diabetes mellitus, new onset (HCC)-recent hgba1 c up to 6.5 and glucose of 180-discussed meds  He declines but will try 3 mo of eliminating the rum and coke drinks and working on weight loss  Appt  after labs in sept  -     Hemoglobin A1C; Future  -     Comprehensive Metabolic Panel; Future  2. Ganglion cyst of volar aspect of left wrist-recently swelling with some discomfort in thumb  Encouraged ortho eval      No follow-ups on file.       Subjective   HPI  Recent blood sugar of 180 followed by hemoglobin A1c of 6.5%.  This is up from 6.1% in the fall.  He notes for the last month or so he has been drinking rum and Cokes almost every evening.  Believes this is contributing to her weight gain of about 15 pounds.  We discussed medications to help with new onset diabetes.  He declines for now but will really work hard on modifying diet particularly the sugary drinks and alcohol and recheck levels in September.    Has a new swollen area that appears consistent with a cyst in the left wrist it is causing some discomfort into the thumb.  He is seen Ortho years ago but at that time the cyst was not present  Review of Systems       Objective   Physical Exam  Constitutional:       Appearance: Normal appearance.   HENT:      Head: Normocephalic and atraumatic.   Cardiovascular:      Rate and Rhythm: Normal rate and regular rhythm.   Pulmonary:      Effort: Pulmonary effort is normal.      Breath sounds: Normal breath sounds. No wheezing or rhonchi.   Musculoskeletal:         General: Normal range of motion.      Right lower leg: No edema.      Left lower leg: No edema.      Comments: Dime sized swelling near base of thumb soft  and feels like a cyst   Neurological:      General: No focal deficit present.      Mental Status: He is alert and oriented to

## 2024-07-22 DIAGNOSIS — I10 HTN, GOAL BELOW 130/80: Primary | ICD-10-CM

## 2024-07-22 RX ORDER — IRBESARTAN 300 MG/1
300 TABLET ORAL
Qty: 90 TABLET | Refills: 0 | Status: SHIPPED | OUTPATIENT
Start: 2024-07-22

## 2024-07-23 DIAGNOSIS — I63.9 ACUTE CVA (CEREBROVASCULAR ACCIDENT) (HCC): ICD-10-CM

## 2024-07-23 RX ORDER — CLOPIDOGREL BISULFATE 75 MG/1
75 TABLET ORAL DAILY
Qty: 90 TABLET | Refills: 1 | Status: SHIPPED | OUTPATIENT
Start: 2024-07-23

## 2024-08-01 DIAGNOSIS — I10 ESSENTIAL (PRIMARY) HYPERTENSION: ICD-10-CM

## 2024-08-01 RX ORDER — AMLODIPINE BESYLATE 10 MG/1
10 TABLET ORAL DAILY
Qty: 90 TABLET | Refills: 1 | Status: SHIPPED | OUTPATIENT
Start: 2024-08-01

## 2024-09-24 DIAGNOSIS — E78.5 DYSLIPIDEMIA, GOAL LDL BELOW 70: ICD-10-CM

## 2024-09-24 RX ORDER — ROSUVASTATIN CALCIUM 10 MG/1
10 TABLET, COATED ORAL DAILY
Qty: 90 TABLET | Refills: 2 | Status: SHIPPED | OUTPATIENT
Start: 2024-09-24

## 2024-09-25 ENCOUNTER — OFFICE VISIT (OUTPATIENT)
Age: 74
End: 2024-09-25
Payer: MEDICARE

## 2024-09-25 VITALS
SYSTOLIC BLOOD PRESSURE: 150 MMHG | HEART RATE: 83 BPM | OXYGEN SATURATION: 95 % | HEIGHT: 71 IN | WEIGHT: 239 LBS | DIASTOLIC BLOOD PRESSURE: 80 MMHG | BODY MASS INDEX: 33.46 KG/M2

## 2024-09-25 DIAGNOSIS — I10 ESSENTIAL (PRIMARY) HYPERTENSION: Primary | ICD-10-CM

## 2024-09-25 DIAGNOSIS — I63.9 ACUTE CVA (CEREBROVASCULAR ACCIDENT) (HCC): ICD-10-CM

## 2024-09-25 DIAGNOSIS — Z95.0 S/P CARDIAC PACEMAKER PROCEDURE: ICD-10-CM

## 2024-09-25 PROCEDURE — G8417 CALC BMI ABV UP PARAM F/U: HCPCS | Performed by: INTERNAL MEDICINE

## 2024-09-25 PROCEDURE — 93005 ELECTROCARDIOGRAM TRACING: CPT | Performed by: INTERNAL MEDICINE

## 2024-09-25 PROCEDURE — 1123F ACP DISCUSS/DSCN MKR DOCD: CPT | Performed by: INTERNAL MEDICINE

## 2024-09-25 PROCEDURE — 1036F TOBACCO NON-USER: CPT | Performed by: INTERNAL MEDICINE

## 2024-09-25 PROCEDURE — 93010 ELECTROCARDIOGRAM REPORT: CPT | Performed by: INTERNAL MEDICINE

## 2024-09-25 PROCEDURE — 3077F SYST BP >= 140 MM HG: CPT | Performed by: INTERNAL MEDICINE

## 2024-09-25 PROCEDURE — G8427 DOCREV CUR MEDS BY ELIG CLIN: HCPCS | Performed by: INTERNAL MEDICINE

## 2024-09-25 PROCEDURE — 99214 OFFICE O/P EST MOD 30 MIN: CPT | Performed by: INTERNAL MEDICINE

## 2024-09-25 PROCEDURE — 3079F DIAST BP 80-89 MM HG: CPT | Performed by: INTERNAL MEDICINE

## 2024-09-25 RX ORDER — METOPROLOL SUCCINATE 25 MG/1
25 TABLET, EXTENDED RELEASE ORAL DAILY
Qty: 90 TABLET | Refills: 3 | Status: SHIPPED | OUTPATIENT
Start: 2024-09-25

## 2024-09-26 ENCOUNTER — TELEPHONE (OUTPATIENT)
Age: 74
End: 2024-09-26

## 2024-09-26 NOTE — TELEPHONE ENCOUNTER
Patient called about possible missing parts on meditronics device that's just arrived, please follow up    P#: [phone]

## 2024-10-02 NOTE — TELEPHONE ENCOUNTER
Received call transferred from call center. ID verified using two patient identifiers. Pt states he hooked up his new home monitor and wanted to be sure that we received the transmission before he sent back his old one that is missing parts. Transmission received 10/1/24.    Opportunities for questions, clarifications, and concerns provided.

## 2024-10-13 PROCEDURE — 93294 REM INTERROG EVL PM/LDLS PM: CPT | Performed by: INTERNAL MEDICINE

## 2024-10-16 DIAGNOSIS — I10 HTN, GOAL BELOW 130/80: ICD-10-CM

## 2024-10-16 RX ORDER — IRBESARTAN 300 MG/1
300 TABLET ORAL
Qty: 90 TABLET | Refills: 0 | Status: SHIPPED | OUTPATIENT
Start: 2024-10-16

## 2024-10-25 DIAGNOSIS — E11.9 DIABETES MELLITUS, NEW ONSET (HCC): ICD-10-CM

## 2024-10-25 LAB
ALBUMIN SERPL-MCNC: 3.8 G/DL (ref 3.5–5)
ALBUMIN/GLOB SERPL: 1.1 (ref 1.1–2.2)
ALP SERPL-CCNC: 104 U/L (ref 45–117)
ALT SERPL-CCNC: 15 U/L (ref 12–78)
ANION GAP SERPL CALC-SCNC: 4 MMOL/L (ref 2–12)
AST SERPL-CCNC: 11 U/L (ref 15–37)
BILIRUB SERPL-MCNC: 0.5 MG/DL (ref 0.2–1)
BUN SERPL-MCNC: 17 MG/DL (ref 6–20)
BUN/CREAT SERPL: 16 (ref 12–20)
CALCIUM SERPL-MCNC: 9 MG/DL (ref 8.5–10.1)
CHLORIDE SERPL-SCNC: 106 MMOL/L (ref 97–108)
CO2 SERPL-SCNC: 30 MMOL/L (ref 21–32)
CREAT SERPL-MCNC: 1.08 MG/DL (ref 0.7–1.3)
EST. AVERAGE GLUCOSE BLD GHB EST-MCNC: 140 MG/DL
GLOBULIN SER CALC-MCNC: 3.6 G/DL (ref 2–4)
GLUCOSE SERPL-MCNC: 123 MG/DL (ref 65–100)
HBA1C MFR BLD: 6.5 % (ref 4–5.6)
POTASSIUM SERPL-SCNC: 4.9 MMOL/L (ref 3.5–5.1)
PROT SERPL-MCNC: 7.4 G/DL (ref 6.4–8.2)
SODIUM SERPL-SCNC: 140 MMOL/L (ref 136–145)

## 2024-11-07 ENCOUNTER — PROCEDURE VISIT (OUTPATIENT)
Age: 74
End: 2024-11-07
Payer: MEDICARE

## 2024-11-07 ENCOUNTER — OFFICE VISIT (OUTPATIENT)
Age: 74
End: 2024-11-07
Payer: MEDICARE

## 2024-11-07 VITALS
HEIGHT: 71 IN | BODY MASS INDEX: 34.92 KG/M2 | WEIGHT: 249.4 LBS | HEART RATE: 79 BPM | OXYGEN SATURATION: 94 % | DIASTOLIC BLOOD PRESSURE: 76 MMHG | SYSTOLIC BLOOD PRESSURE: 124 MMHG

## 2024-11-07 DIAGNOSIS — Z95.0 PACEMAKER: Primary | ICD-10-CM

## 2024-11-07 DIAGNOSIS — Z86.73 HISTORY OF STROKE: ICD-10-CM

## 2024-11-07 DIAGNOSIS — I44.2 COMPLETE AV BLOCK (HCC): ICD-10-CM

## 2024-11-07 DIAGNOSIS — Z95.0 PRESENCE OF CARDIAC PACEMAKER: Primary | ICD-10-CM

## 2024-11-07 DIAGNOSIS — I10 PRIMARY HYPERTENSION: ICD-10-CM

## 2024-11-07 PROCEDURE — 1036F TOBACCO NON-USER: CPT | Performed by: NURSE PRACTITIONER

## 2024-11-07 PROCEDURE — 93280 PM DEVICE PROGR EVAL DUAL: CPT | Performed by: INTERNAL MEDICINE

## 2024-11-07 PROCEDURE — 99214 OFFICE O/P EST MOD 30 MIN: CPT | Performed by: NURSE PRACTITIONER

## 2024-11-07 NOTE — PROGRESS NOTES
Chief Complaint   Patient presents with    SSS    Heart Block      Vitals:    11/07/24 1311   BP: 124/76   Site: Left Upper Arm   Position: Sitting   Pulse: 79   SpO2: 94%   Weight: 113.1 kg (249 lb 6.4 oz)   Height: 1.803 m (5' 11\")       Chest pain NO     ER, urgent care, or hospitalized outside of [facility] since your last visit?  NO     Refills NO   
segments on the right.  Moderate stenosis P2 segment on the left.  Subacute moderate sized infarction left anterior thalamus, left internal capsule  No aneurysm, dissection or major vessel occlusion.    Microvascular ischemic change as well.  Considering recurrent history of small infarctions demonstrated on both MR  3/25/2017 and recent subacute infarction in the left basal ganglia consider CTA  of the head/neck for greater detailed delineation of degree of cerebral  vasculopathy.          Current meds:  Current Outpatient Medications   Medication Sig Dispense Refill    irbesartan (AVAPRO) 300 MG tablet TAKE ONE TABLET BY MOUTH ONCE NIGHTLY 90 tablet 0    metoprolol succinate (TOPROL XL) 25 MG extended release tablet Take 1 tablet by mouth daily 90 tablet 3    rosuvastatin (CRESTOR) 10 MG tablet TAKE 1 TABLET BY MOUTH DAILY 90 tablet 2    amLODIPine (NORVASC) 10 MG tablet TAKE 1 TABLET BY MOUTH DAILY 90 tablet 1    clopidogrel (PLAVIX) 75 MG tablet TAKE 1 TABLET BY MOUTH DAILY 90 tablet 1    aspirin 81 MG chewable tablet Take 1 tablet by mouth daily      fluticasone (FLONASE) 50 MCG/ACT nasal spray 2 sprays by Nasal route daily       No current facility-administered medications for this visit.          Yoanna Tavrea, TAMY - NP  [facility]  [address], [address]  [place], [place] [address] [phone]      CC:Jessi Nuñez MD

## 2024-11-07 NOTE — PATIENT INSTRUCTIONS
Please send manual transmission remotes on the following dates:  01/01/2025  04/01/2025  07/01/2025  10/01/2025

## 2024-11-12 ENCOUNTER — OFFICE VISIT (OUTPATIENT)
Age: 74
End: 2024-11-12
Payer: MEDICARE

## 2024-11-12 VITALS
DIASTOLIC BLOOD PRESSURE: 85 MMHG | WEIGHT: 247 LBS | SYSTOLIC BLOOD PRESSURE: 135 MMHG | BODY MASS INDEX: 34.58 KG/M2 | RESPIRATION RATE: 16 BRPM | OXYGEN SATURATION: 94 % | TEMPERATURE: 97.9 F | HEIGHT: 71 IN | HEART RATE: 71 BPM

## 2024-11-12 DIAGNOSIS — Z00.00 MEDICARE ANNUAL WELLNESS VISIT, SUBSEQUENT: Primary | ICD-10-CM

## 2024-11-12 DIAGNOSIS — Z23 NEED FOR VACCINATION WITH 20-POLYVALENT PNEUMOCOCCAL CONJUGATE VACCINE: ICD-10-CM

## 2024-11-12 DIAGNOSIS — I63.9 CRYPTOGENIC STROKE (HCC): ICD-10-CM

## 2024-11-12 DIAGNOSIS — I49.5 SSS (SICK SINUS SYNDROME) (HCC): ICD-10-CM

## 2024-11-12 DIAGNOSIS — I10 HTN, GOAL BELOW 130/80: ICD-10-CM

## 2024-11-12 PROCEDURE — 90677 PCV20 VACCINE IM: CPT | Performed by: INTERNAL MEDICINE

## 2024-11-12 PROCEDURE — 99213 OFFICE O/P EST LOW 20 MIN: CPT | Performed by: INTERNAL MEDICINE

## 2024-11-12 SDOH — ECONOMIC STABILITY: INCOME INSECURITY: HOW HARD IS IT FOR YOU TO PAY FOR THE VERY BASICS LIKE FOOD, HOUSING, MEDICAL CARE, AND HEATING?: NOT HARD AT ALL

## 2024-11-12 SDOH — ECONOMIC STABILITY: FOOD INSECURITY: WITHIN THE PAST 12 MONTHS, YOU WORRIED THAT YOUR FOOD WOULD RUN OUT BEFORE YOU GOT MONEY TO BUY MORE.: NEVER TRUE

## 2024-11-12 SDOH — ECONOMIC STABILITY: FOOD INSECURITY: WITHIN THE PAST 12 MONTHS, THE FOOD YOU BOUGHT JUST DIDN'T LAST AND YOU DIDN'T HAVE MONEY TO GET MORE.: NEVER TRUE

## 2024-11-12 ASSESSMENT — PATIENT HEALTH QUESTIONNAIRE - PHQ9
SUM OF ALL RESPONSES TO PHQ QUESTIONS 1-9: 0
SUM OF ALL RESPONSES TO PHQ QUESTIONS 1-9: 0
2. FEELING DOWN, DEPRESSED OR HOPELESS: NOT AT ALL
SUM OF ALL RESPONSES TO PHQ9 QUESTIONS 1 & 2: 0
SUM OF ALL RESPONSES TO PHQ QUESTIONS 1-9: 0
SUM OF ALL RESPONSES TO PHQ QUESTIONS 1-9: 0
1. LITTLE INTEREST OR PLEASURE IN DOING THINGS: NOT AT ALL

## 2024-11-12 ASSESSMENT — LIFESTYLE VARIABLES
HOW MANY STANDARD DRINKS CONTAINING ALCOHOL DO YOU HAVE ON A TYPICAL DAY: 1 OR 2
HOW OFTEN DO YOU HAVE A DRINK CONTAINING ALCOHOL: 2-3 TIMES A WEEK

## 2024-11-12 NOTE — ASSESSMENT & PLAN NOTE
Status post pacemaker for sick sinus syndrome.  Reports he has 3 years left on current device.  Seeing cardiology regularly.  No syncope or presyncope  Cologuard negative in 2023.  Recent hemoglobin A1c 6.5% and recent cholesterol 137 labs reviewed today

## 2024-11-12 NOTE — PROGRESS NOTES
Giovanni Flaherty (:  1950) is a 74 y.o. male,Established patient, here for evaluation of the following chief complaint(s):  Medicare AWV (AWV)         Assessment & Plan  Medicare annual wellness visit, subsequent  Reports recent flu COVID and RSV vaccines.  Prevnar updated today.    Walking and running a total of 2-1/2 hours a week continue exercise.         HTN, goal below 130/80  Currently on irbesartan 300 mg daily metoprolol 25 mg daily amlodipine 10 mg daily BP reasonably controlled         Cryptogenic stroke (HCC)  No recent neurologic symptoms continue statin Plavix and aspirin         SSS (sick sinus syndrome) (HCC)  Status post pacemaker for sick sinus syndrome.  Reports he has 3 years left on current device.  Seeing cardiology regularly.  No syncope or presyncope  Cologuard negative in .  Recent hemoglobin A1c 6.5% and recent cholesterol 137 labs reviewed today           No follow-ups on file.       Subjective   HPI  Seen for wellness visit and follow-up on medications.  He has really no new complaints    Social history drinks alcohol 3 days a week, enjoys his 8 grandchildren all in town.  Walks and at times runs generally 2-1/2 hours/week.    Tolerates current medicines no myalgias no chest pain no change in bowels no urinary symptoms no syncope or presyncope    Reviewed hemoglobin A1c stable at 6.5% would not start medication encourage diet and exercise  Review of Systems       Objective   Physical Exam  Constitutional:       Appearance: Normal appearance.   HENT:      Head: Normocephalic and atraumatic.      Right Ear: Tympanic membrane normal.      Left Ear: Tympanic membrane normal.   Cardiovascular:      Rate and Rhythm: Normal rate and regular rhythm.   Pulmonary:      Effort: Pulmonary effort is normal.      Breath sounds: Normal breath sounds. No wheezing or rhonchi.   Musculoskeletal:      Right lower leg: No edema.      Left lower leg: No edema.   Neurological:      General: No

## 2025-01-12 DIAGNOSIS — I10 HTN, GOAL BELOW 130/80: ICD-10-CM

## 2025-01-13 RX ORDER — IRBESARTAN 300 MG/1
300 TABLET ORAL
Qty: 90 TABLET | Refills: 1 | Status: SHIPPED | OUTPATIENT
Start: 2025-01-13

## 2025-01-25 DIAGNOSIS — I10 ESSENTIAL (PRIMARY) HYPERTENSION: ICD-10-CM

## 2025-01-25 DIAGNOSIS — I63.9 ACUTE CVA (CEREBROVASCULAR ACCIDENT) (HCC): ICD-10-CM

## 2025-01-26 RX ORDER — CLOPIDOGREL BISULFATE 75 MG/1
75 TABLET ORAL DAILY
Qty: 90 TABLET | Refills: 1 | Status: SHIPPED | OUTPATIENT
Start: 2025-01-26

## 2025-01-26 RX ORDER — AMLODIPINE BESYLATE 10 MG/1
10 TABLET ORAL DAILY
Qty: 90 TABLET | Refills: 1 | Status: SHIPPED | OUTPATIENT
Start: 2025-01-26

## 2025-05-12 ENCOUNTER — OFFICE VISIT (OUTPATIENT)
Facility: CLINIC | Age: 75
End: 2025-05-12
Payer: MEDICARE

## 2025-05-12 VITALS
RESPIRATION RATE: 16 BRPM | SYSTOLIC BLOOD PRESSURE: 130 MMHG | OXYGEN SATURATION: 93 % | BODY MASS INDEX: 34.86 KG/M2 | HEIGHT: 71 IN | WEIGHT: 249 LBS | DIASTOLIC BLOOD PRESSURE: 78 MMHG | TEMPERATURE: 98 F | HEART RATE: 81 BPM

## 2025-05-12 DIAGNOSIS — I49.5 SSS (SICK SINUS SYNDROME) (HCC): ICD-10-CM

## 2025-05-12 DIAGNOSIS — E11.9 TYPE 2 DIABETES, HBA1C GOAL < 7% (HCC): ICD-10-CM

## 2025-05-12 DIAGNOSIS — I10 HTN, GOAL BELOW 130/80: Primary | ICD-10-CM

## 2025-05-12 DIAGNOSIS — Z86.73 HISTORY OF STROKE: ICD-10-CM

## 2025-05-12 PROBLEM — I63.9 ACUTE CVA (CEREBROVASCULAR ACCIDENT) (HCC): Status: RESOLVED | Noted: 2024-09-25 | Resolved: 2025-05-12

## 2025-05-12 LAB
ALBUMIN SERPL-MCNC: 3.8 G/DL (ref 3.5–5)
ALBUMIN/GLOB SERPL: 0.9 (ref 1.1–2.2)
ALP SERPL-CCNC: 125 U/L (ref 45–117)
ALT SERPL-CCNC: 18 U/L (ref 12–78)
ANION GAP SERPL CALC-SCNC: 5 MMOL/L (ref 2–12)
AST SERPL-CCNC: 11 U/L (ref 15–37)
BILIRUB SERPL-MCNC: 0.4 MG/DL (ref 0.2–1)
BUN SERPL-MCNC: 19 MG/DL (ref 6–20)
BUN/CREAT SERPL: 16 (ref 12–20)
CALCIUM SERPL-MCNC: 9.4 MG/DL (ref 8.5–10.1)
CHLORIDE SERPL-SCNC: 105 MMOL/L (ref 97–108)
CHOLEST SERPL-MCNC: 129 MG/DL
CO2 SERPL-SCNC: 31 MMOL/L (ref 21–32)
CREAT SERPL-MCNC: 1.22 MG/DL (ref 0.7–1.3)
CREAT UR-MCNC: 130 MG/DL
EST. AVERAGE GLUCOSE BLD GHB EST-MCNC: 151 MG/DL
GLOBULIN SER CALC-MCNC: 4.2 G/DL (ref 2–4)
GLUCOSE SERPL-MCNC: 142 MG/DL (ref 65–100)
HBA1C MFR BLD: 6.9 % (ref 4–5.6)
HDLC SERPL-MCNC: 45 MG/DL
HDLC SERPL: 2.9 (ref 0–5)
LDLC SERPL CALC-MCNC: 58.4 MG/DL (ref 0–100)
MICROALBUMIN UR-MCNC: 0.96 MG/DL
MICROALBUMIN/CREAT UR-RTO: 7 MG/G (ref 0–30)
POTASSIUM SERPL-SCNC: 4.4 MMOL/L (ref 3.5–5.1)
PROT SERPL-MCNC: 8 G/DL (ref 6.4–8.2)
SODIUM SERPL-SCNC: 141 MMOL/L (ref 136–145)
TRIGL SERPL-MCNC: 128 MG/DL
VLDLC SERPL CALC-MCNC: 25.6 MG/DL

## 2025-05-12 PROCEDURE — 1160F RVW MEDS BY RX/DR IN RCRD: CPT | Performed by: INTERNAL MEDICINE

## 2025-05-12 PROCEDURE — 3075F SYST BP GE 130 - 139MM HG: CPT | Performed by: INTERNAL MEDICINE

## 2025-05-12 PROCEDURE — G8417 CALC BMI ABV UP PARAM F/U: HCPCS | Performed by: INTERNAL MEDICINE

## 2025-05-12 PROCEDURE — G8427 DOCREV CUR MEDS BY ELIG CLIN: HCPCS | Performed by: INTERNAL MEDICINE

## 2025-05-12 PROCEDURE — 99214 OFFICE O/P EST MOD 30 MIN: CPT | Performed by: INTERNAL MEDICINE

## 2025-05-12 PROCEDURE — 1123F ACP DISCUSS/DSCN MKR DOCD: CPT | Performed by: INTERNAL MEDICINE

## 2025-05-12 PROCEDURE — 1126F AMNT PAIN NOTED NONE PRSNT: CPT | Performed by: INTERNAL MEDICINE

## 2025-05-12 PROCEDURE — 1159F MED LIST DOCD IN RCRD: CPT | Performed by: INTERNAL MEDICINE

## 2025-05-12 PROCEDURE — 1036F TOBACCO NON-USER: CPT | Performed by: INTERNAL MEDICINE

## 2025-05-12 PROCEDURE — 3078F DIAST BP <80 MM HG: CPT | Performed by: INTERNAL MEDICINE

## 2025-05-12 SDOH — ECONOMIC STABILITY: FOOD INSECURITY: WITHIN THE PAST 12 MONTHS, YOU WORRIED THAT YOUR FOOD WOULD RUN OUT BEFORE YOU GOT MONEY TO BUY MORE.: NEVER TRUE

## 2025-05-12 SDOH — ECONOMIC STABILITY: FOOD INSECURITY: WITHIN THE PAST 12 MONTHS, THE FOOD YOU BOUGHT JUST DIDN'T LAST AND YOU DIDN'T HAVE MONEY TO GET MORE.: NEVER TRUE

## 2025-05-12 ASSESSMENT — PATIENT HEALTH QUESTIONNAIRE - PHQ9
SUM OF ALL RESPONSES TO PHQ QUESTIONS 1-9: 0
2. FEELING DOWN, DEPRESSED OR HOPELESS: NOT AT ALL
1. LITTLE INTEREST OR PLEASURE IN DOING THINGS: NOT AT ALL
SUM OF ALL RESPONSES TO PHQ QUESTIONS 1-9: 0

## 2025-05-12 NOTE — PROGRESS NOTES
present.      Mental Status: He is alert and oriented to person, place, and time.   Psychiatric:         Behavior: Behavior normal.              An electronic signature was used to authenticate this note.    --Jessi Nuñez MD

## 2025-05-14 ENCOUNTER — RESULTS FOLLOW-UP (OUTPATIENT)
Facility: CLINIC | Age: 75
End: 2025-05-14

## 2025-06-17 DIAGNOSIS — E78.5 DYSLIPIDEMIA, GOAL LDL BELOW 70: ICD-10-CM

## 2025-06-17 RX ORDER — ROSUVASTATIN CALCIUM 10 MG/1
10 TABLET, COATED ORAL DAILY
Qty: 90 TABLET | Refills: 3 | Status: SHIPPED | OUTPATIENT
Start: 2025-06-17

## 2025-07-12 DIAGNOSIS — I10 HTN, GOAL BELOW 130/80: ICD-10-CM

## 2025-07-14 RX ORDER — IRBESARTAN 300 MG/1
300 TABLET ORAL
Qty: 90 TABLET | Refills: 1 | Status: SHIPPED | OUTPATIENT
Start: 2025-07-14

## 2025-07-23 DIAGNOSIS — I10 ESSENTIAL (PRIMARY) HYPERTENSION: ICD-10-CM

## 2025-07-23 DIAGNOSIS — I63.9 ACUTE CVA (CEREBROVASCULAR ACCIDENT) (HCC): ICD-10-CM

## 2025-07-23 RX ORDER — CLOPIDOGREL BISULFATE 75 MG/1
75 TABLET ORAL DAILY
Qty: 90 TABLET | Refills: 1 | Status: SHIPPED | OUTPATIENT
Start: 2025-07-23

## 2025-07-23 RX ORDER — AMLODIPINE BESYLATE 10 MG/1
10 TABLET ORAL DAILY
Qty: 90 TABLET | Refills: 1 | Status: SHIPPED | OUTPATIENT
Start: 2025-07-23